# Patient Record
Sex: FEMALE | Race: WHITE | NOT HISPANIC OR LATINO | Employment: OTHER | URBAN - METROPOLITAN AREA
[De-identification: names, ages, dates, MRNs, and addresses within clinical notes are randomized per-mention and may not be internally consistent; named-entity substitution may affect disease eponyms.]

---

## 2017-01-01 ENCOUNTER — HOSPITAL ENCOUNTER (EMERGENCY)
Facility: HOSPITAL | Age: 77
Discharge: HOME | End: 2017-12-11
Attending: EMERGENCY MEDICINE
Payer: MEDICARE

## 2017-01-01 VITALS
SYSTOLIC BLOOD PRESSURE: 153 MMHG | RESPIRATION RATE: 18 BRPM | TEMPERATURE: 97 F | DIASTOLIC BLOOD PRESSURE: 88 MMHG | BODY MASS INDEX: 19.14 KG/M2 | HEIGHT: 63 IN | WEIGHT: 108 LBS | OXYGEN SATURATION: 100 % | HEART RATE: 67 BPM

## 2017-01-01 DIAGNOSIS — R10.30 PAIN IN THE GROIN: ICD-10-CM

## 2017-01-01 DIAGNOSIS — R10.9 ABDOMINAL PAIN, UNSPECIFIED ABDOMINAL LOCATION: Primary | ICD-10-CM

## 2017-01-01 LAB
ALBUMIN SERPL BCP-MCNC: 3.8 G/DL
ALP SERPL-CCNC: 98 U/L
ALT SERPL W/O P-5'-P-CCNC: 8 U/L
ANION GAP SERPL CALC-SCNC: 10 MMOL/L
AST SERPL-CCNC: 16 U/L
BACTERIA #/AREA URNS AUTO: NORMAL /HPF
BASOPHILS # BLD AUTO: 0.05 K/UL
BASOPHILS NFR BLD: 0.7 %
BILIRUB SERPL-MCNC: 0.7 MG/DL
BILIRUB UR QL STRIP: NEGATIVE
BUN SERPL-MCNC: 26 MG/DL
BUN SERPL-MCNC: 29 MG/DL (ref 6–30)
CALCIUM SERPL-MCNC: 9.7 MG/DL
CHLORIDE SERPL-SCNC: 101 MMOL/L
CHLORIDE SERPL-SCNC: 103 MMOL/L (ref 95–110)
CLARITY UR REFRACT.AUTO: ABNORMAL
CO2 SERPL-SCNC: 21 MMOL/L
COLOR UR AUTO: YELLOW
CREAT SERPL-MCNC: 1.6 MG/DL
CREAT SERPL-MCNC: 1.7 MG/DL (ref 0.5–1.4)
DIFFERENTIAL METHOD: ABNORMAL
EOSINOPHIL # BLD AUTO: 0.1 K/UL
EOSINOPHIL NFR BLD: 1.2 %
ERYTHROCYTE [DISTWIDTH] IN BLOOD BY AUTOMATED COUNT: 14.7 %
EST. GFR  (AFRICAN AMERICAN): 35.3 ML/MIN/1.73 M^2
EST. GFR  (NON AFRICAN AMERICAN): 30.6 ML/MIN/1.73 M^2
GLUCOSE SERPL-MCNC: 91 MG/DL
GLUCOSE SERPL-MCNC: 98 MG/DL (ref 70–110)
GLUCOSE UR QL STRIP: NEGATIVE
HCT VFR BLD AUTO: 41.5 %
HCT VFR BLD CALC: 43 %PCV (ref 36–54)
HGB BLD-MCNC: 13.6 G/DL
HGB UR QL STRIP: NEGATIVE
HYALINE CASTS UR QL AUTO: 1 /LPF
IMM GRANULOCYTES # BLD AUTO: 0.09 K/UL
IMM GRANULOCYTES NFR BLD AUTO: 1.3 %
KETONES UR QL STRIP: NEGATIVE
LACTATE SERPL-SCNC: 2.1 MMOL/L
LEUKOCYTE ESTERASE UR QL STRIP: ABNORMAL
LIPASE SERPL-CCNC: 22 U/L
LYMPHOCYTES # BLD AUTO: 1.8 K/UL
LYMPHOCYTES NFR BLD: 27.2 %
MCH RBC QN AUTO: 31.6 PG
MCHC RBC AUTO-ENTMCNC: 32.8 G/DL
MCV RBC AUTO: 97 FL
MICROSCOPIC COMMENT: NORMAL
MONOCYTES # BLD AUTO: 0.4 K/UL
MONOCYTES NFR BLD: 6 %
NEUTROPHILS # BLD AUTO: 4.2 K/UL
NEUTROPHILS NFR BLD: 63.6 %
NITRITE UR QL STRIP: NEGATIVE
NRBC BLD-RTO: 0 /100 WBC
PH UR STRIP: 7 [PH] (ref 5–8)
PLATELET # BLD AUTO: 180 K/UL
PMV BLD AUTO: 10.9 FL
POC IONIZED CALCIUM: 1.05 MMOL/L (ref 1.06–1.42)
POC TCO2 (MEASURED): 23 MMOL/L (ref 23–29)
POTASSIUM BLD-SCNC: 5.5 MMOL/L (ref 3.5–5.1)
POTASSIUM SERPL-SCNC: 5.5 MMOL/L
PROT SERPL-MCNC: 8.3 G/DL
PROT UR QL STRIP: NEGATIVE
RBC # BLD AUTO: 4.3 M/UL
RBC #/AREA URNS AUTO: 2 /HPF (ref 0–4)
SAMPLE: ABNORMAL
SODIUM BLD-SCNC: 134 MMOL/L (ref 136–145)
SODIUM SERPL-SCNC: 132 MMOL/L
SP GR UR STRIP: 1.01 (ref 1–1.03)
SQUAMOUS #/AREA URNS AUTO: 7 /HPF
URN SPEC COLLECT METH UR: ABNORMAL
UROBILINOGEN UR STRIP-ACNC: NEGATIVE EU/DL
WBC # BLD AUTO: 6.68 K/UL
WBC #/AREA URNS AUTO: 1 /HPF (ref 0–5)

## 2017-01-01 PROCEDURE — 85025 COMPLETE CBC W/AUTO DIFF WBC: CPT

## 2017-01-01 PROCEDURE — 83690 ASSAY OF LIPASE: CPT

## 2017-01-01 PROCEDURE — 99284 EMERGENCY DEPT VISIT MOD MDM: CPT | Mod: 25

## 2017-01-01 PROCEDURE — 96361 HYDRATE IV INFUSION ADD-ON: CPT

## 2017-01-01 PROCEDURE — 25000003 PHARM REV CODE 250: Performed by: PHYSICIAN ASSISTANT

## 2017-01-01 PROCEDURE — 80053 COMPREHEN METABOLIC PANEL: CPT

## 2017-01-01 PROCEDURE — 81001 URINALYSIS AUTO W/SCOPE: CPT

## 2017-01-01 PROCEDURE — 96360 HYDRATION IV INFUSION INIT: CPT

## 2017-01-01 PROCEDURE — 99284 EMERGENCY DEPT VISIT MOD MDM: CPT | Mod: ,,, | Performed by: EMERGENCY MEDICINE

## 2017-01-01 PROCEDURE — 83605 ASSAY OF LACTIC ACID: CPT

## 2017-01-01 RX ORDER — TRIMETHOPRIM 100 MG/1
100 TABLET ORAL EVERY 12 HOURS
Status: ON HOLD | COMMUNITY
End: 2018-01-01

## 2017-01-01 RX ORDER — AMIODARONE HYDROCHLORIDE 200 MG/1
TABLET ORAL DAILY
COMMUNITY

## 2017-01-01 RX ORDER — LEVOTHYROXINE SODIUM 25 UG/1
25 TABLET ORAL DAILY
COMMUNITY

## 2017-01-01 RX ORDER — DICYCLOMINE HYDROCHLORIDE 10 MG/1
10 CAPSULE ORAL
Status: COMPLETED | OUTPATIENT
Start: 2017-01-01 | End: 2017-01-01

## 2017-01-01 RX ORDER — SODIUM CHLORIDE 9 MG/ML
500 INJECTION, SOLUTION INTRAVENOUS
Status: COMPLETED | OUTPATIENT
Start: 2017-01-01 | End: 2017-01-01

## 2017-01-01 RX ORDER — BENAZEPRIL HYDROCHLORIDE 20 MG/1
20 TABLET ORAL DAILY
Status: ON HOLD | COMMUNITY
End: 2018-01-01

## 2017-01-01 RX ORDER — GABAPENTIN 600 MG/1
600 TABLET ORAL 3 TIMES DAILY
COMMUNITY

## 2017-01-01 RX ORDER — ALPRAZOLAM 0.5 MG/1
0.5 TABLET ORAL 3 TIMES DAILY
Status: ON HOLD | COMMUNITY
End: 2018-01-01

## 2017-01-01 RX ORDER — DICYCLOMINE HYDROCHLORIDE 20 MG/1
20 TABLET ORAL 2 TIMES DAILY PRN
Qty: 20 TABLET | Refills: 0 | Status: SHIPPED | OUTPATIENT
Start: 2017-01-01 | End: 2018-01-01

## 2017-01-01 RX ORDER — ESTRADIOL 0.5 MG/1
0.5 TABLET ORAL DAILY
Status: ON HOLD | COMMUNITY
End: 2018-01-01 | Stop reason: HOSPADM

## 2017-01-01 RX ORDER — ACETAMINOPHEN 325 MG/1
650 TABLET ORAL
Status: COMPLETED | OUTPATIENT
Start: 2017-01-01 | End: 2017-01-01

## 2017-01-01 RX ORDER — LUBIPROSTONE 24 UG/1
24 CAPSULE ORAL 2 TIMES DAILY WITH MEALS
COMMUNITY

## 2017-01-01 RX ORDER — CHOLESTYRAMINE
POWDER (GRAM) MISCELLANEOUS
Status: ON HOLD | COMMUNITY
End: 2018-01-01

## 2017-01-01 RX ORDER — PROMETHAZINE HYDROCHLORIDE 25 MG/1
25 TABLET ORAL EVERY 6 HOURS PRN
COMMUNITY

## 2017-01-01 RX ADMIN — ACETAMINOPHEN 650 MG: 325 TABLET ORAL at 03:12

## 2017-01-01 RX ADMIN — DICYCLOMINE HYDROCHLORIDE 10 MG: 10 CAPSULE ORAL at 04:12

## 2017-01-01 RX ADMIN — SODIUM CHLORIDE 500 ML: 0.9 INJECTION, SOLUTION INTRAVENOUS at 03:12

## 2017-12-11 NOTE — ED PROVIDER NOTES
Encounter Date: 12/11/2017    SCRIBE #1 NOTE: I, Katelyn Simpson , am scribing for, and in the presence of,  Dr. Singh . I have scribed the following portions of the note - the APC attestation.       History     Chief Complaint   Patient presents with    Female  Problem     i think i have a kidney infection, been fighting infection for 1 year,      Patient is a 77-year-old female with past medical history of hypertension, renal disorder, thyroid disease, coronary artery disease, anticoagulation longer use, who presents to the emergency department due to a 2 day history of lower quadrant abdominal pain.  Patient states she has had frequent UTIs in the past and this feels similar in nature.  Patient is currently staying with her son but receives the majority of her care in [place].  Patient states she is to follow up with urology near future due to frequent UTIs.  Patient is requesting a urine analysis and urine culture that she can take to her follow-up appointment with urology.  In speaking with the patient's daughter-in-law individually she states that the patient does take multiple doses of laxatives a day and then takes Imodium due to the cramping.  Patient denies any fevers, chills, chest pain, shortness of breath, or any other complaints.            Review of patient's allergies indicates:  No Known Allergies  Past Medical History:   Diagnosis Date    Anticoagulant long-term use     Coronary artery disease     Hypertension     Renal disorder     Thyroid disease      Past Surgical History:   Procedure Laterality Date    APPENDECTOMY      CARDIAC SURGERY      HYSTERECTOMY      JOINT REPLACEMENT       History reviewed. No pertinent family history.  Social History   Substance Use Topics    Smoking status: Never Smoker    Smokeless tobacco: Never Used    Alcohol use No     Review of Systems   Constitutional: Negative for activity change, appetite change, diaphoresis, fatigue and fever.   HENT:  Negative for congestion, dental problem, drooling, ear pain, facial swelling, sore throat and trouble swallowing.    Eyes: Negative for pain, discharge and visual disturbance.   Respiratory: Negative for apnea, cough, chest tightness and shortness of breath.    Cardiovascular: Negative for chest pain and palpitations.   Gastrointestinal: Positive for abdominal pain. Negative for abdominal distention, anal bleeding, blood in stool, diarrhea, nausea and vomiting.   Endocrine: Negative for cold intolerance and polydipsia.   Genitourinary: Negative for decreased urine volume, difficulty urinating, enuresis, frequency and hematuria.   Musculoskeletal: Negative for arthralgias, gait problem, myalgias and neck stiffness.   Skin: Negative for color change and pallor.   Allergic/Immunologic: Negative for environmental allergies.   Neurological: Negative for dizziness, syncope, numbness and headaches.   Psychiatric/Behavioral: Negative for agitation, confusion and dysphoric mood.       Physical Exam     Initial Vitals [12/11/17 1027]   BP Pulse Resp Temp SpO2   (!) 153/88 67 18 97.3 °F (36.3 °C) 100 %      MAP       109.67         Physical Exam    Nursing note and vitals reviewed.  Constitutional: She appears well-developed and well-nourished. She is not diaphoretic. No distress.   HENT:   Head: Normocephalic and atraumatic.   Neck: Normal range of motion. Neck supple.   Cardiovascular: Normal rate, regular rhythm and normal heart sounds. Exam reveals no gallop and no friction rub.    No murmur heard.  Pulmonary/Chest: Breath sounds normal. She has no wheezes. She has no rhonchi. She has no rales.   Abdominal: Soft. Bowel sounds are normal. There is tenderness. There is no rebound and no guarding.   Musculoskeletal: Normal range of motion.   Neurological: She is alert and oriented to person, place, and time.   Skin: Skin is warm and dry. No rash noted. No erythema.   Psychiatric: She has a normal mood and affect.         ED  Course   Procedures  Labs Reviewed   URINALYSIS, REFLEX TO URINE CULTURE - Abnormal; Notable for the following:        Result Value    Appearance, UA Hazy (*)     Leukocytes, UA Trace (*)     All other components within normal limits    Narrative:     Preferred Collection Type->Urine, Clean Catch   CBC W/ AUTO DIFFERENTIAL - Abnormal; Notable for the following:     MCH 31.6 (*)     RDW 14.7 (*)     Immature Granulocytes 1.3 (*)     Immature Grans (Abs) 0.09 (*)     All other components within normal limits   COMPREHENSIVE METABOLIC PANEL - Abnormal; Notable for the following:     Sodium 132 (*)     Potassium 5.5 (*)     CO2 21 (*)     BUN, Bld 26 (*)     Creatinine 1.6 (*)     ALT 8 (*)     eGFR if  35.3 (*)     eGFR if non  30.6 (*)     All other components within normal limits    Narrative:     ADD ON CMP & LIPASE PER DR. JUAN CARLOS WEBB AT  12/11/2017  15:53   (USED GREENX)   ISTAT PROCEDURE - Abnormal; Notable for the following:     POC Creatinine 1.7 (*)     POC Sodium 134 (*)     POC Potassium 5.5 (*)     POC Ionized Calcium 1.05 (*)     All other components within normal limits   URINALYSIS MICROSCOPIC    Narrative:     Preferred Collection Type->Urine, Clean Catch   LACTIC ACID, PLASMA   COMPREHENSIVE METABOLIC PANEL   LIPASE   LIPASE    Narrative:     ADD ON CMP & LIPASE PER DR. JUAN CARLOS WEBB AT  12/11/2017  15:53   (USED GREENX)                   APC / Resident Notes:   Patient is a 77-year-old female with past medical history of hypertension, renal disorder, thyroid disease, coronary artery disease, anticoagulation longer use, who presents to the emergency department due to a 2 day history of lower quadrant abdominal pain.  Physical exam reveals female in no acute distress.  Heart regular rate and rhythm.  Lungs clear to auscultation bilaterally.  Abdomen tender to palpation in the lower quadrant.  Will obtain UA and lab work.    UA shows no sign of infection. CBC fairly  unremarkable.  CMP shows sodium of 132, potassium 5.5, , creatinine 1.6.  Lipase 22.  CT shows no evidence of obstructing renal stone.  Patient will be discharged home in stable condition.       Scribe Attestation:   Scribe #1: I performed the above scribed service and the documentation accurately describes the services I performed. I attest to the accuracy of the note.    Attending Attestation:     Physician Attestation Statement for NP/PA:   I have conducted a face to face encounter with this patient in addition to the NP/PA, due to Medical Complexity    Other NP/PA Attestation Additions:    History of Present Illness: 78 yo women with HTN, recurrent UTIs presenting with lower abdominal pain and dysuria for the last day concerning for UTI. Urine is negative. CT negative for acute process.  Patient and son extensively counseled findings.  On reevaluation, patient's abdomen is soft, she reports pain improved, and she tolerating food by mouth.  Extensively advised indications to return.         Physician Attestation for Scribe:      Comments: I, Dr. John Singh, personally performed the services described in this documentation. All medical record entries made by the scribe were at my direction and in my presence.  I have reviewed the chart and agree that the record reflects my personal performance and is accurate and complete. John Singh MD.  5:34 PM 12/11/2017              ED Course      Clinical Impression:   The encounter diagnosis was Abdominal pain, unspecified abdominal location.    Disposition:   Disposition: Discharged  Condition: Stable                        Anu Perez PA-C  12/11/17 2110

## 2017-12-11 NOTE — ED TRIAGE NOTES
"Lower pelvic pain for the last year,  Believes it to be a UTI.  Denies fever. Coming from [facility], [place]. Also, has a  "infection" between her great and 2nd  left toe.   "

## 2018-01-01 ENCOUNTER — HOSPITAL ENCOUNTER (INPATIENT)
Facility: HOSPITAL | Age: 78
LOS: 11 days | DRG: 175 | End: 2018-05-15
Attending: EMERGENCY MEDICINE | Admitting: FAMILY MEDICINE
Payer: MEDICARE

## 2018-01-01 ENCOUNTER — PATIENT OUTREACH (OUTPATIENT)
Dept: ADMINISTRATIVE | Facility: CLINIC | Age: 78
End: 2018-01-01

## 2018-01-01 VITALS
BODY MASS INDEX: 20.71 KG/M2 | OXYGEN SATURATION: 74 % | TEMPERATURE: 97 F | WEIGHT: 116.88 LBS | DIASTOLIC BLOOD PRESSURE: 47 MMHG | SYSTOLIC BLOOD PRESSURE: 64 MMHG | HEIGHT: 63 IN

## 2018-01-01 DIAGNOSIS — Z51.5 HOSPICE CARE: ICD-10-CM

## 2018-01-01 DIAGNOSIS — I26.99 PULMONARY EMBOLISM WITH INFARCTION: ICD-10-CM

## 2018-01-01 DIAGNOSIS — I48.91 ATRIAL FIBRILLATION WITH RVR: ICD-10-CM

## 2018-01-01 DIAGNOSIS — J18.9 HCAP (HEALTHCARE-ASSOCIATED PNEUMONIA): ICD-10-CM

## 2018-01-01 DIAGNOSIS — I10 ESSENTIAL HYPERTENSION: ICD-10-CM

## 2018-01-01 DIAGNOSIS — I50.9 CHF (CONGESTIVE HEART FAILURE): ICD-10-CM

## 2018-01-01 DIAGNOSIS — I95.1 ORTHOSTATIC HYPOTENSION: ICD-10-CM

## 2018-01-01 DIAGNOSIS — I50.42 CHRONIC COMBINED SYSTOLIC AND DIASTOLIC HEART FAILURE: ICD-10-CM

## 2018-01-01 DIAGNOSIS — B96.29 UTI DUE TO EXTENDED-SPECTRUM BETA LACTAMASE (ESBL) PRODUCING ESCHERICHIA COLI: ICD-10-CM

## 2018-01-01 DIAGNOSIS — J90 PLEURAL EFFUSION, RIGHT: ICD-10-CM

## 2018-01-01 DIAGNOSIS — R09.02 HYPOXIA: ICD-10-CM

## 2018-01-01 DIAGNOSIS — I26.99 BILATERAL PULMONARY EMBOLISM: Primary | ICD-10-CM

## 2018-01-01 DIAGNOSIS — I50.40 COMBINED SYSTOLIC AND DIASTOLIC CONGESTIVE HEART FAILURE, UNSPECIFIED HF CHRONICITY: ICD-10-CM

## 2018-01-01 DIAGNOSIS — D72.829 LEUKOCYTOSIS, UNSPECIFIED TYPE: ICD-10-CM

## 2018-01-01 DIAGNOSIS — I48.19 PERSISTENT ATRIAL FIBRILLATION: ICD-10-CM

## 2018-01-01 DIAGNOSIS — I50.43 ACUTE ON CHRONIC COMBINED SYSTOLIC AND DIASTOLIC CONGESTIVE HEART FAILURE: ICD-10-CM

## 2018-01-01 DIAGNOSIS — E87.1 HYPONATREMIA: ICD-10-CM

## 2018-01-01 DIAGNOSIS — R07.9 CHEST PAIN: ICD-10-CM

## 2018-01-01 DIAGNOSIS — I48.91 ATRIAL FIBRILLATION: ICD-10-CM

## 2018-01-01 DIAGNOSIS — N39.0 UTI DUE TO EXTENDED-SPECTRUM BETA LACTAMASE (ESBL) PRODUCING ESCHERICHIA COLI: ICD-10-CM

## 2018-01-01 DIAGNOSIS — R07.1 CHEST PAIN ON BREATHING: ICD-10-CM

## 2018-01-01 DIAGNOSIS — A41.9 SEPSIS, DUE TO UNSPECIFIED ORGANISM: ICD-10-CM

## 2018-01-01 DIAGNOSIS — Z16.12 UTI DUE TO EXTENDED-SPECTRUM BETA LACTAMASE (ESBL) PRODUCING ESCHERICHIA COLI: ICD-10-CM

## 2018-01-01 LAB
ALBUMIN SERPL BCP-MCNC: 1.5 G/DL
ALBUMIN SERPL BCP-MCNC: 1.5 G/DL
ALBUMIN SERPL BCP-MCNC: 1.6 G/DL
ALBUMIN SERPL BCP-MCNC: 1.6 G/DL
ALBUMIN SERPL BCP-MCNC: 1.7 G/DL
ALBUMIN SERPL BCP-MCNC: 1.7 G/DL
ALBUMIN SERPL BCP-MCNC: 1.8 G/DL
ALBUMIN SERPL BCP-MCNC: 2.3 G/DL
ALLENS TEST: ABNORMAL
ALP SERPL-CCNC: 105 U/L
ALP SERPL-CCNC: 106 U/L
ALP SERPL-CCNC: 115 U/L
ALP SERPL-CCNC: 115 U/L
ALP SERPL-CCNC: 118 U/L
ALP SERPL-CCNC: 148 U/L
ALP SERPL-CCNC: 81 U/L
ALP SERPL-CCNC: 81 U/L
ALP SERPL-CCNC: 89 U/L
ALP SERPL-CCNC: 90 U/L
ALP SERPL-CCNC: 93 U/L
ALT SERPL W/O P-5'-P-CCNC: 14 U/L
ALT SERPL W/O P-5'-P-CCNC: 21 U/L
ALT SERPL W/O P-5'-P-CCNC: 30 U/L
ALT SERPL W/O P-5'-P-CCNC: 7 U/L
ALT SERPL W/O P-5'-P-CCNC: 7 U/L
ALT SERPL W/O P-5'-P-CCNC: 8 U/L
ALT SERPL W/O P-5'-P-CCNC: 9 U/L
ALT SERPL W/O P-5'-P-CCNC: 9 U/L
AMYLASE, BODY FLUID: 16 U/L
ANION GAP SERPL CALC-SCNC: 10 MMOL/L
ANION GAP SERPL CALC-SCNC: 11 MMOL/L
ANION GAP SERPL CALC-SCNC: 13 MMOL/L
ANION GAP SERPL CALC-SCNC: 13 MMOL/L
ANION GAP SERPL CALC-SCNC: 6 MMOL/L
ANION GAP SERPL CALC-SCNC: 7 MMOL/L
ANION GAP SERPL CALC-SCNC: 7 MMOL/L
ANION GAP SERPL CALC-SCNC: 9 MMOL/L
ANION GAP SERPL CALC-SCNC: 9 MMOL/L
ANISOCYTOSIS BLD QL SMEAR: SLIGHT
ANISOCYTOSIS BLD QL SMEAR: SLIGHT
AORTIC VALVE STENOSIS: ABNORMAL
APPEARANCE FLD: NORMAL
APTT BLDCRRT: 22.5 SEC
APTT BLDCRRT: 28.1 SEC
APTT BLDCRRT: 36.2 SEC
APTT BLDCRRT: 39.1 SEC
APTT BLDCRRT: 49.7 SEC
APTT BLDCRRT: 51.2 SEC
APTT BLDCRRT: 51.9 SEC
APTT BLDCRRT: 57.4 SEC
APTT BLDCRRT: 68.3 SEC
APTT BLDCRRT: 69.6 SEC
AST SERPL-CCNC: 12 U/L
AST SERPL-CCNC: 13 U/L
AST SERPL-CCNC: 14 U/L
AST SERPL-CCNC: 15 U/L
AST SERPL-CCNC: 16 U/L
AST SERPL-CCNC: 36 U/L
AST SERPL-CCNC: 69 U/L
BACTERIA #/AREA URNS HPF: ABNORMAL /HPF
BACTERIA #/AREA URNS HPF: ABNORMAL /HPF
BACTERIA BLD CULT: NORMAL
BACTERIA BLD CULT: NORMAL
BACTERIA FLD CULT: NORMAL
BACTERIA UR CULT: NORMAL
BACTERIA UR CULT: NORMAL
BASOPHILS # BLD AUTO: 0.01 K/UL
BASOPHILS # BLD AUTO: 0.02 K/UL
BASOPHILS # BLD AUTO: 0.03 K/UL
BASOPHILS # BLD AUTO: 0.04 K/UL
BASOPHILS # BLD AUTO: 0.06 K/UL
BASOPHILS # BLD AUTO: ABNORMAL K/UL
BASOPHILS NFR BLD: 0 %
BASOPHILS NFR BLD: 0.1 %
BASOPHILS NFR BLD: 0.2 %
BASOPHILS NFR BLD: 0.2 %
BASOPHILS NFR BLD: 0.3 %
BASOPHILS NFR BLD: 0.5 %
BASOPHILS NFR BLD: 0.6 %
BASOPHILS NFR BLD: 0.6 %
BILIRUB SERPL-MCNC: 0.3 MG/DL
BILIRUB SERPL-MCNC: 0.3 MG/DL
BILIRUB SERPL-MCNC: 0.4 MG/DL
BILIRUB SERPL-MCNC: 0.4 MG/DL
BILIRUB SERPL-MCNC: 0.5 MG/DL
BILIRUB SERPL-MCNC: 0.7 MG/DL
BILIRUB SERPL-MCNC: 0.8 MG/DL
BILIRUB UR QL STRIP: ABNORMAL
BILIRUB UR QL STRIP: NEGATIVE
BNP SERPL-MCNC: 1282 PG/ML
BNP SERPL-MCNC: 415 PG/ML
BODY FLD TYPE: NORMAL
BODY FLUID SOURCE AMYLASE: NORMAL
BODY FLUID SOURCE, LDH: NORMAL
BUN SERPL-MCNC: 10 MG/DL
BUN SERPL-MCNC: 11 MG/DL
BUN SERPL-MCNC: 12 MG/DL
BUN SERPL-MCNC: 12 MG/DL
BUN SERPL-MCNC: 13 MG/DL
BUN SERPL-MCNC: 18 MG/DL
BUN SERPL-MCNC: 23 MG/DL
BUN SERPL-MCNC: 9 MG/DL
CALCIUM SERPL-MCNC: 8 MG/DL
CALCIUM SERPL-MCNC: 8 MG/DL
CALCIUM SERPL-MCNC: 8.1 MG/DL
CALCIUM SERPL-MCNC: 8.3 MG/DL
CALCIUM SERPL-MCNC: 8.5 MG/DL
CALCIUM SERPL-MCNC: 8.6 MG/DL
CALCIUM SERPL-MCNC: 8.7 MG/DL
CALCIUM SERPL-MCNC: 8.8 MG/DL
CALCIUM SERPL-MCNC: 9 MG/DL
CHLORIDE SERPL-SCNC: 100 MMOL/L
CHLORIDE SERPL-SCNC: 101 MMOL/L
CHLORIDE SERPL-SCNC: 102 MMOL/L
CHLORIDE SERPL-SCNC: 103 MMOL/L
CHLORIDE SERPL-SCNC: 105 MMOL/L
CHLORIDE SERPL-SCNC: 90 MMOL/L
CHLORIDE SERPL-SCNC: 95 MMOL/L
CHLORIDE SERPL-SCNC: 95 MMOL/L
CHLORIDE SERPL-SCNC: 98 MMOL/L
CHLORIDE SERPL-SCNC: 98 MMOL/L
CHLORIDE SERPL-SCNC: 99 MMOL/L
CHOLEST FLD-MCNC: 52 MG/DL
CLARITY UR: CLEAR
CLARITY UR: CLEAR
CO2 SERPL-SCNC: 22 MMOL/L
CO2 SERPL-SCNC: 23 MMOL/L
CO2 SERPL-SCNC: 25 MMOL/L
CO2 SERPL-SCNC: 25 MMOL/L
CO2 SERPL-SCNC: 26 MMOL/L
CO2 SERPL-SCNC: 27 MMOL/L
COLOR FLD: YELLOW
COLOR UR: YELLOW
COLOR UR: YELLOW
CORTIS SERPL-MCNC: 12.8 UG/DL
CREAT SERPL-MCNC: 0.7 MG/DL
CREAT SERPL-MCNC: 0.8 MG/DL
DELSYS: ABNORMAL
DIFFERENTIAL METHOD: ABNORMAL
EOSINOPHIL # BLD AUTO: 0 K/UL
EOSINOPHIL # BLD AUTO: 0.1 K/UL
EOSINOPHIL # BLD AUTO: 0.2 K/UL
EOSINOPHIL # BLD AUTO: ABNORMAL K/UL
EOSINOPHIL NFR BLD: 0 %
EOSINOPHIL NFR BLD: 0.1 %
EOSINOPHIL NFR BLD: 0.1 %
EOSINOPHIL NFR BLD: 0.8 %
EOSINOPHIL NFR BLD: 1.2 %
EOSINOPHIL NFR BLD: 1.5 %
EOSINOPHIL NFR BLD: 1.6 %
EOSINOPHIL NFR BLD: 1.6 %
EOSINOPHIL NFR BLD: 1.8 %
EOSINOPHIL NFR BLD: 2 %
EOSINOPHIL NFR BLD: 2.7 %
EOSINOPHIL NFR BLD: 3 %
ERYTHROCYTE [DISTWIDTH] IN BLOOD BY AUTOMATED COUNT: 17.5 %
ERYTHROCYTE [DISTWIDTH] IN BLOOD BY AUTOMATED COUNT: 17.5 %
ERYTHROCYTE [DISTWIDTH] IN BLOOD BY AUTOMATED COUNT: 17.7 %
ERYTHROCYTE [DISTWIDTH] IN BLOOD BY AUTOMATED COUNT: 17.7 %
ERYTHROCYTE [DISTWIDTH] IN BLOOD BY AUTOMATED COUNT: 17.9 %
ERYTHROCYTE [DISTWIDTH] IN BLOOD BY AUTOMATED COUNT: 18 %
ERYTHROCYTE [DISTWIDTH] IN BLOOD BY AUTOMATED COUNT: 18.1 %
ERYTHROCYTE [DISTWIDTH] IN BLOOD BY AUTOMATED COUNT: 18.2 %
ERYTHROCYTE [DISTWIDTH] IN BLOOD BY AUTOMATED COUNT: 18.3 %
EST. GFR  (AFRICAN AMERICAN): >60 ML/MIN/1.73 M^2
EST. GFR  (NON AFRICAN AMERICAN): >60 ML/MIN/1.73 M^2
ESTIMATED PA SYSTOLIC PRESSURE: 29.42
FACT X PPP CHRO-ACNC: 0.28 IU/ML
FACT X PPP CHRO-ACNC: 0.32 IU/ML
FACT X PPP CHRO-ACNC: <0.1 IU/ML
FACT X PPP CHRO-ACNC: <0.1 IU/ML
GLOBAL PERICARDIAL EFFUSION: ABNORMAL
GLUCOSE SERPL-MCNC: 123 MG/DL
GLUCOSE SERPL-MCNC: 124 MG/DL
GLUCOSE SERPL-MCNC: 154 MG/DL
GLUCOSE SERPL-MCNC: 80 MG/DL
GLUCOSE SERPL-MCNC: 81 MG/DL
GLUCOSE SERPL-MCNC: 85 MG/DL
GLUCOSE SERPL-MCNC: 93 MG/DL
GLUCOSE SERPL-MCNC: 94 MG/DL
GLUCOSE SERPL-MCNC: 96 MG/DL
GLUCOSE SERPL-MCNC: 98 MG/DL
GLUCOSE SERPL-MCNC: 98 MG/DL
GLUCOSE UR QL STRIP: NEGATIVE
GLUCOSE UR QL STRIP: NEGATIVE
GRAM STN SPEC: NORMAL
GRAM STN SPEC: NORMAL
HBV CORE AB SERPL QL IA: NEGATIVE
HBV SURFACE AG SERPL QL IA: NEGATIVE
HCO3 UR-SCNC: 23.3 MMOL/L (ref 24–28)
HCT VFR BLD AUTO: 32.9 %
HCT VFR BLD AUTO: 33.6 %
HCT VFR BLD AUTO: 33.6 %
HCT VFR BLD AUTO: 34.1 %
HCT VFR BLD AUTO: 34.8 %
HCT VFR BLD AUTO: 36.4 %
HCT VFR BLD AUTO: 36.7 %
HCT VFR BLD AUTO: 37 %
HCT VFR BLD AUTO: 37.6 %
HCT VFR BLD AUTO: 38.1 %
HCT VFR BLD AUTO: 38.8 %
HCT VFR BLD AUTO: 40.6 %
HGB BLD-MCNC: 10.4 G/DL
HGB BLD-MCNC: 10.7 G/DL
HGB BLD-MCNC: 10.8 G/DL
HGB BLD-MCNC: 10.9 G/DL
HGB BLD-MCNC: 11 G/DL
HGB BLD-MCNC: 11.5 G/DL
HGB BLD-MCNC: 11.6 G/DL
HGB BLD-MCNC: 11.7 G/DL
HGB BLD-MCNC: 12.1 G/DL
HGB BLD-MCNC: 12.1 G/DL
HGB BLD-MCNC: 12.3 G/DL
HGB BLD-MCNC: 12.7 G/DL
HGB UR QL STRIP: ABNORMAL
HGB UR QL STRIP: NEGATIVE
HYALINE CASTS #/AREA URNS LPF: 2 /LPF
HYPOCHROMIA BLD QL SMEAR: ABNORMAL
INR PPP: 1.1
KETONES UR QL STRIP: ABNORMAL
KETONES UR QL STRIP: NEGATIVE
LACTATE SERPL-SCNC: 1.2 MMOL/L
LACTATE SERPL-SCNC: 1.3 MMOL/L
LACTATE SERPL-SCNC: 2.1 MMOL/L
LDH FLD L TO P-CCNC: 228 U/L
LDH SERPL L TO P-CCNC: 161 U/L
LEUKOCYTE ESTERASE UR QL STRIP: ABNORMAL
LEUKOCYTE ESTERASE UR QL STRIP: ABNORMAL
LYMPHOCYTES # BLD AUTO: 0.6 K/UL
LYMPHOCYTES # BLD AUTO: 0.7 K/UL
LYMPHOCYTES # BLD AUTO: 0.9 K/UL
LYMPHOCYTES # BLD AUTO: 1 K/UL
LYMPHOCYTES # BLD AUTO: 1.1 K/UL
LYMPHOCYTES # BLD AUTO: 1.2 K/UL
LYMPHOCYTES # BLD AUTO: 1.3 K/UL
LYMPHOCYTES # BLD AUTO: 1.4 K/UL
LYMPHOCYTES # BLD AUTO: ABNORMAL K/UL
LYMPHOCYTES NFR BLD: 11.1 %
LYMPHOCYTES NFR BLD: 11.6 %
LYMPHOCYTES NFR BLD: 12.7 %
LYMPHOCYTES NFR BLD: 15.7 %
LYMPHOCYTES NFR BLD: 17.6 %
LYMPHOCYTES NFR BLD: 2.1 %
LYMPHOCYTES NFR BLD: 20.2 %
LYMPHOCYTES NFR BLD: 3.5 %
LYMPHOCYTES NFR BLD: 5.7 %
LYMPHOCYTES NFR BLD: 7 %
LYMPHOCYTES NFR BLD: 8.9 %
LYMPHOCYTES NFR BLD: 9.6 %
LYMPHOCYTES NFR FLD MANUAL: 15 %
MAGNESIUM SERPL-MCNC: 1.6 MG/DL
MAGNESIUM SERPL-MCNC: 1.8 MG/DL
MCH RBC QN AUTO: 29.1 PG
MCH RBC QN AUTO: 29.1 PG
MCH RBC QN AUTO: 29.2 PG
MCH RBC QN AUTO: 29.4 PG
MCH RBC QN AUTO: 29.6 PG
MCH RBC QN AUTO: 30 PG
MCH RBC QN AUTO: 30.5 PG
MCHC RBC AUTO-ENTMCNC: 31.3 G/DL
MCHC RBC AUTO-ENTMCNC: 31.4 G/DL
MCHC RBC AUTO-ENTMCNC: 31.6 G/DL
MCHC RBC AUTO-ENTMCNC: 31.7 G/DL
MCHC RBC AUTO-ENTMCNC: 31.8 G/DL
MCHC RBC AUTO-ENTMCNC: 31.8 G/DL
MCHC RBC AUTO-ENTMCNC: 31.9 G/DL
MCHC RBC AUTO-ENTMCNC: 32 G/DL
MCHC RBC AUTO-ENTMCNC: 32.1 G/DL
MCHC RBC AUTO-ENTMCNC: 32.2 G/DL
MCV RBC AUTO: 90 FL
MCV RBC AUTO: 92 FL
MCV RBC AUTO: 93 FL
MCV RBC AUTO: 93 FL
MCV RBC AUTO: 94 FL
MCV RBC AUTO: 95 FL
MICROSCOPIC COMMENT: ABNORMAL
MICROSCOPIC COMMENT: ABNORMAL
MITRAL VALVE REGURGITATION: ABNORMAL
MONOCYTES # BLD AUTO: 0.6 K/UL
MONOCYTES # BLD AUTO: 0.7 K/UL
MONOCYTES # BLD AUTO: 0.8 K/UL
MONOCYTES # BLD AUTO: 0.9 K/UL
MONOCYTES # BLD AUTO: 1.1 K/UL
MONOCYTES # BLD AUTO: 1.2 K/UL
MONOCYTES # BLD AUTO: 1.3 K/UL
MONOCYTES # BLD AUTO: 1.5 K/UL
MONOCYTES # BLD AUTO: ABNORMAL K/UL
MONOCYTES NFR BLD: 4.5 %
MONOCYTES NFR BLD: 6.4 %
MONOCYTES NFR BLD: 6.7 %
MONOCYTES NFR BLD: 6.8 %
MONOCYTES NFR BLD: 7 %
MONOCYTES NFR BLD: 7.2 %
MONOCYTES NFR BLD: 7.8 %
MONOCYTES NFR BLD: 8.4 %
MONOCYTES NFR BLD: 8.6 %
MONOCYTES NFR BLD: 8.9 %
MONOCYTES NFR BLD: 8.9 %
MONOCYTES NFR BLD: 9.7 %
MONOS+MACROS NFR FLD MANUAL: 35 %
NEUTROPHILS # BLD AUTO: 13.9 K/UL
NEUTROPHILS # BLD AUTO: 18.6 K/UL
NEUTROPHILS # BLD AUTO: 25.5 K/UL
NEUTROPHILS # BLD AUTO: 4.4 K/UL
NEUTROPHILS # BLD AUTO: 4.4 K/UL
NEUTROPHILS # BLD AUTO: 4.6 K/UL
NEUTROPHILS # BLD AUTO: 6.9 K/UL
NEUTROPHILS # BLD AUTO: 8.2 K/UL
NEUTROPHILS # BLD AUTO: 8.4 K/UL
NEUTROPHILS # BLD AUTO: 9.6 K/UL
NEUTROPHILS # BLD AUTO: 9.8 K/UL
NEUTROPHILS # BLD AUTO: ABNORMAL K/UL
NEUTROPHILS NFR BLD: 65.9 %
NEUTROPHILS NFR BLD: 68.4 %
NEUTROPHILS NFR BLD: 68.7 %
NEUTROPHILS NFR BLD: 76.5 %
NEUTROPHILS NFR BLD: 76.5 %
NEUTROPHILS NFR BLD: 77.1 %
NEUTROPHILS NFR BLD: 80.4 %
NEUTROPHILS NFR BLD: 80.8 %
NEUTROPHILS NFR BLD: 83 %
NEUTROPHILS NFR BLD: 84.9 %
NEUTROPHILS NFR BLD: 88 %
NEUTROPHILS NFR BLD: 93.2 %
NEUTROPHILS NFR FLD MANUAL: 50 %
NEUTS BAND NFR BLD MANUAL: 1 %
NITRITE UR QL STRIP: NEGATIVE
NITRITE UR QL STRIP: POSITIVE
PCO2 BLDA: 39.9 MMHG (ref 35–45)
PH SMN: 7.37 [PH] (ref 7.35–7.45)
PH UR STRIP: 5 [PH] (ref 5–8)
PH UR STRIP: 6 [PH] (ref 5–8)
PHOSPHATE SERPL-MCNC: 3.1 MG/DL
PHOSPHATE SERPL-MCNC: 3.4 MG/DL
PLATELET # BLD AUTO: 242 K/UL
PLATELET # BLD AUTO: 273 K/UL
PLATELET # BLD AUTO: 295 K/UL
PLATELET # BLD AUTO: 300 K/UL
PLATELET # BLD AUTO: 322 K/UL
PLATELET # BLD AUTO: 332 K/UL
PLATELET # BLD AUTO: 338 K/UL
PLATELET # BLD AUTO: 342 K/UL
PLATELET # BLD AUTO: 351 K/UL
PLATELET # BLD AUTO: 377 K/UL
PLATELET # BLD AUTO: 418 K/UL
PLATELET # BLD AUTO: 487 K/UL
PLATELET BLD QL SMEAR: ABNORMAL
PLATELET BLD QL SMEAR: ABNORMAL
PMV BLD AUTO: 10.2 FL
PMV BLD AUTO: 10.7 FL
PMV BLD AUTO: 9.5 FL
PMV BLD AUTO: 9.6 FL
PMV BLD AUTO: 9.7 FL
PMV BLD AUTO: 9.8 FL
PMV BLD AUTO: 9.9 FL
PO2 BLDA: 79 MMHG (ref 80–100)
POC BE: -2 MMOL/L
POC SATURATED O2: 95 % (ref 95–100)
POC TCO2: 24 MMOL/L (ref 23–27)
POTASSIUM SERPL-SCNC: 3.1 MMOL/L
POTASSIUM SERPL-SCNC: 3.2 MMOL/L
POTASSIUM SERPL-SCNC: 3.4 MMOL/L
POTASSIUM SERPL-SCNC: 3.7 MMOL/L
POTASSIUM SERPL-SCNC: 4.1 MMOL/L
POTASSIUM SERPL-SCNC: 4.2 MMOL/L
POTASSIUM SERPL-SCNC: 4.3 MMOL/L
POTASSIUM SERPL-SCNC: 4.7 MMOL/L
POTASSIUM SERPL-SCNC: 4.7 MMOL/L
POTASSIUM SERPL-SCNC: 4.8 MMOL/L
POTASSIUM SERPL-SCNC: 5.1 MMOL/L
PROT FLD-MCNC: 2.8 G/DL
PROT SERPL-MCNC: 4.6 G/DL
PROT SERPL-MCNC: 4.7 G/DL
PROT SERPL-MCNC: 4.9 G/DL
PROT SERPL-MCNC: 5.1 G/DL
PROT SERPL-MCNC: 5.1 G/DL
PROT SERPL-MCNC: 5.2 G/DL
PROT SERPL-MCNC: 5.4 G/DL
PROT SERPL-MCNC: 5.4 G/DL
PROT SERPL-MCNC: 5.5 G/DL
PROT SERPL-MCNC: 5.6 G/DL
PROT SERPL-MCNC: 6 G/DL
PROT UR QL STRIP: ABNORMAL
PROT UR QL STRIP: ABNORMAL
PROTHROMBIN TIME: 11.1 SEC
RBC # BLD AUTO: 3.54 M/UL
RBC # BLD AUTO: 3.54 M/UL
RBC # BLD AUTO: 3.64 M/UL
RBC # BLD AUTO: 3.71 M/UL
RBC # BLD AUTO: 3.71 M/UL
RBC # BLD AUTO: 3.88 M/UL
RBC # BLD AUTO: 3.92 M/UL
RBC # BLD AUTO: 3.98 M/UL
RBC # BLD AUTO: 4.04 M/UL
RBC # BLD AUTO: 4.16 M/UL
RBC # BLD AUTO: 4.23 M/UL
RBC # BLD AUTO: 4.35 M/UL
RBC #/AREA URNS HPF: 20 /HPF (ref 0–4)
RETIRED EF AND QEF - SEE NOTES: 10 (ref 55–65)
SAMPLE: ABNORMAL
SITE: ABNORMAL
SODIUM SERPL-SCNC: 129 MMOL/L
SODIUM SERPL-SCNC: 129 MMOL/L
SODIUM SERPL-SCNC: 130 MMOL/L
SODIUM SERPL-SCNC: 131 MMOL/L
SODIUM SERPL-SCNC: 132 MMOL/L
SODIUM SERPL-SCNC: 133 MMOL/L
SODIUM SERPL-SCNC: 135 MMOL/L
SODIUM SERPL-SCNC: 136 MMOL/L
SODIUM SERPL-SCNC: 137 MMOL/L
SP GR UR STRIP: 1.01 (ref 1–1.03)
SP GR UR STRIP: >=1.03 (ref 1–1.03)
SPECIMEN SOURCE: NORMAL
SPECIMEN SOURCE: NORMAL
SQUAMOUS #/AREA URNS HPF: 2 /HPF
TB INDURATION 48 - 72 HR READ: 0 MM
TRICUSPID VALVE REGURGITATION: ABNORMAL
TROPONIN I SERPL DL<=0.01 NG/ML-MCNC: 0.01 NG/ML
TSH SERPL DL<=0.005 MIU/L-ACNC: 0.46 UIU/ML
URN SPEC COLLECT METH UR: ABNORMAL
URN SPEC COLLECT METH UR: ABNORMAL
UROBILINOGEN UR STRIP-ACNC: NEGATIVE EU/DL
UROBILINOGEN UR STRIP-ACNC: NEGATIVE EU/DL
VANCOMYCIN TROUGH SERPL-MCNC: 16.3 UG/ML
VANCOMYCIN TROUGH SERPL-MCNC: 7.4 UG/ML
WBC # BLD AUTO: 10.77 K/UL
WBC # BLD AUTO: 10.9 K/UL
WBC # BLD AUTO: 12.14 K/UL
WBC # BLD AUTO: 12.18 K/UL
WBC # BLD AUTO: 14.28 K/UL
WBC # BLD AUTO: 16.37 K/UL
WBC # BLD AUTO: 21.15 K/UL
WBC # BLD AUTO: 27.52 K/UL
WBC # BLD AUTO: 6.38 K/UL
WBC # BLD AUTO: 6.63 K/UL
WBC # BLD AUTO: 7.09 K/UL
WBC # BLD AUTO: 9.03 K/UL
WBC # FLD: 540 /CU MM
WBC #/AREA URNS HPF: 50 /HPF (ref 0–5)
WBC #/AREA URNS HPF: >100 /HPF (ref 0–5)
WBC CASTS #/AREA URNS LPF: 1 /LPF
YEAST URNS QL MICRO: ABNORMAL

## 2018-01-01 PROCEDURE — 85520 HEPARIN ASSAY: CPT

## 2018-01-01 PROCEDURE — 25000003 PHARM REV CODE 250: Performed by: STUDENT IN AN ORGANIZED HEALTH CARE EDUCATION/TRAINING PROGRAM

## 2018-01-01 PROCEDURE — 63600175 PHARM REV CODE 636 W HCPCS: Performed by: STUDENT IN AN ORGANIZED HEALTH CARE EDUCATION/TRAINING PROGRAM

## 2018-01-01 PROCEDURE — 82803 BLOOD GASES ANY COMBINATION: CPT

## 2018-01-01 PROCEDURE — 25000003 PHARM REV CODE 250: Performed by: FAMILY MEDICINE

## 2018-01-01 PROCEDURE — 93005 ELECTROCARDIOGRAM TRACING: CPT

## 2018-01-01 PROCEDURE — 87088 URINE BACTERIA CULTURE: CPT

## 2018-01-01 PROCEDURE — 94640 AIRWAY INHALATION TREATMENT: CPT

## 2018-01-01 PROCEDURE — C9113 INJ PANTOPRAZOLE SODIUM, VIA: HCPCS | Performed by: STUDENT IN AN ORGANIZED HEALTH CARE EDUCATION/TRAINING PROGRAM

## 2018-01-01 PROCEDURE — 99152 MOD SED SAME PHYS/QHP 5/>YRS: CPT | Mod: ,,, | Performed by: INTERNAL MEDICINE

## 2018-01-01 PROCEDURE — 94761 N-INVAS EAR/PLS OXIMETRY MLT: CPT

## 2018-01-01 PROCEDURE — 25000242 PHARM REV CODE 250 ALT 637 W/ HCPCS: Performed by: STUDENT IN AN ORGANIZED HEALTH CARE EDUCATION/TRAINING PROGRAM

## 2018-01-01 PROCEDURE — 25000003 PHARM REV CODE 250: Performed by: INTERNAL MEDICINE

## 2018-01-01 PROCEDURE — 25000003 PHARM REV CODE 250

## 2018-01-01 PROCEDURE — 97530 THERAPEUTIC ACTIVITIES: CPT

## 2018-01-01 PROCEDURE — 83735 ASSAY OF MAGNESIUM: CPT

## 2018-01-01 PROCEDURE — 85730 THROMBOPLASTIN TIME PARTIAL: CPT

## 2018-01-01 PROCEDURE — 97535 SELF CARE MNGMENT TRAINING: CPT

## 2018-01-01 PROCEDURE — 80053 COMPREHEN METABOLIC PANEL: CPT

## 2018-01-01 PROCEDURE — 96365 THER/PROPH/DIAG IV INF INIT: CPT

## 2018-01-01 PROCEDURE — 37211 THROMBOLYTIC ART THERAPY: CPT | Mod: 50

## 2018-01-01 PROCEDURE — 84443 ASSAY THYROID STIM HORMONE: CPT

## 2018-01-01 PROCEDURE — 27000221 HC OXYGEN, UP TO 24 HOURS

## 2018-01-01 PROCEDURE — 36415 COLL VENOUS BLD VENIPUNCTURE: CPT

## 2018-01-01 PROCEDURE — 0W993ZZ DRAINAGE OF RIGHT PLEURAL CAVITY, PERCUTANEOUS APPROACH: ICD-10-PCS | Performed by: INTERNAL MEDICINE

## 2018-01-01 PROCEDURE — G8979 MOBILITY GOAL STATUS: HCPCS | Mod: CK

## 2018-01-01 PROCEDURE — 83615 LACTATE (LD) (LDH) ENZYME: CPT

## 2018-01-01 PROCEDURE — 97803 MED NUTRITION INDIV SUBSEQ: CPT

## 2018-01-01 PROCEDURE — 93306 TTE W/DOPPLER COMPLETE: CPT

## 2018-01-01 PROCEDURE — 97110 THERAPEUTIC EXERCISES: CPT

## 2018-01-01 PROCEDURE — 85025 COMPLETE CBC W/AUTO DIFF WBC: CPT

## 2018-01-01 PROCEDURE — 92526 ORAL FUNCTION THERAPY: CPT

## 2018-01-01 PROCEDURE — 88305 TISSUE EXAM BY PATHOLOGIST: CPT | Mod: 26,,, | Performed by: PATHOLOGY

## 2018-01-01 PROCEDURE — 37211 THROMBOLYTIC ART THERAPY: CPT | Mod: 50,,, | Performed by: INTERNAL MEDICINE

## 2018-01-01 PROCEDURE — 87086 URINE CULTURE/COLONY COUNT: CPT

## 2018-01-01 PROCEDURE — 25000003 PHARM REV CODE 250: Performed by: NURSE PRACTITIONER

## 2018-01-01 PROCEDURE — 93306 TTE W/DOPPLER COMPLETE: CPT | Mod: 26,,, | Performed by: INTERNAL MEDICINE

## 2018-01-01 PROCEDURE — 87070 CULTURE OTHR SPECIMN AEROBIC: CPT

## 2018-01-01 PROCEDURE — 97802 MEDICAL NUTRITION INDIV IN: CPT

## 2018-01-01 PROCEDURE — 84311 SPECTROPHOTOMETRY: CPT

## 2018-01-01 PROCEDURE — 96367 TX/PROPH/DG ADDL SEQ IV INF: CPT

## 2018-01-01 PROCEDURE — 85027 COMPLETE CBC AUTOMATED: CPT

## 2018-01-01 PROCEDURE — 86704 HEP B CORE ANTIBODY TOTAL: CPT

## 2018-01-01 PROCEDURE — 99233 SBSQ HOSP IP/OBS HIGH 50: CPT | Mod: ,,, | Performed by: NURSE PRACTITIONER

## 2018-01-01 PROCEDURE — 63600175 PHARM REV CODE 636 W HCPCS: Performed by: FAMILY MEDICINE

## 2018-01-01 PROCEDURE — 80202 ASSAY OF VANCOMYCIN: CPT

## 2018-01-01 PROCEDURE — 3E06317 INTRODUCTION OF OTHER THROMBOLYTIC INTO CENTRAL ARTERY, PERCUTANEOUS APPROACH: ICD-10-PCS | Performed by: INTERNAL MEDICINE

## 2018-01-01 PROCEDURE — 63600175 PHARM REV CODE 636 W HCPCS: Performed by: EMERGENCY MEDICINE

## 2018-01-01 PROCEDURE — 83615 LACTATE (LD) (LDH) ENZYME: CPT | Mod: 91

## 2018-01-01 PROCEDURE — 25000003 PHARM REV CODE 250: Performed by: EMERGENCY MEDICINE

## 2018-01-01 PROCEDURE — 87186 SC STD MICRODIL/AGAR DIL: CPT

## 2018-01-01 PROCEDURE — 94660 CPAP INITIATION&MGMT: CPT

## 2018-01-01 PROCEDURE — 20000000 HC ICU ROOM

## 2018-01-01 PROCEDURE — 85730 THROMBOPLASTIN TIME PARTIAL: CPT | Mod: 91

## 2018-01-01 PROCEDURE — 89051 BODY FLUID CELL COUNT: CPT

## 2018-01-01 PROCEDURE — 99233 SBSQ HOSP IP/OBS HIGH 50: CPT | Mod: ,,, | Performed by: INTERNAL MEDICINE

## 2018-01-01 PROCEDURE — 32555 ASPIRATE PLEURA W/ IMAGING: CPT | Mod: RT,GC,, | Performed by: INTERNAL MEDICINE

## 2018-01-01 PROCEDURE — 87040 BLOOD CULTURE FOR BACTERIA: CPT

## 2018-01-01 PROCEDURE — G8996 SWALLOW CURRENT STATUS: HCPCS | Mod: CI

## 2018-01-01 PROCEDURE — 87077 CULTURE AEROBIC IDENTIFY: CPT

## 2018-01-01 PROCEDURE — 27000190 HC CPAP FULL FACE MASK W/VALVE

## 2018-01-01 PROCEDURE — 63600175 PHARM REV CODE 636 W HCPCS: Performed by: INTERNAL MEDICINE

## 2018-01-01 PROCEDURE — G8978 MOBILITY CURRENT STATUS: HCPCS | Mod: CM

## 2018-01-01 PROCEDURE — G8997 SWALLOW GOAL STATUS: HCPCS | Mod: CI

## 2018-01-01 PROCEDURE — 97161 PT EVAL LOW COMPLEX 20 MIN: CPT

## 2018-01-01 PROCEDURE — 97165 OT EVAL LOW COMPLEX 30 MIN: CPT

## 2018-01-01 PROCEDURE — 88305 TISSUE EXAM BY PATHOLOGIST: CPT | Performed by: PATHOLOGY

## 2018-01-01 PROCEDURE — 87340 HEPATITIS B SURFACE AG IA: CPT

## 2018-01-01 PROCEDURE — 99221 1ST HOSP IP/OBS SF/LOW 40: CPT | Mod: 25,GC,, | Performed by: INTERNAL MEDICINE

## 2018-01-01 PROCEDURE — 99223 1ST HOSP IP/OBS HIGH 75: CPT | Mod: ,,, | Performed by: FAMILY MEDICINE

## 2018-01-01 PROCEDURE — G8998 SWALLOW D/C STATUS: HCPCS | Mod: CI

## 2018-01-01 PROCEDURE — 82533 TOTAL CORTISOL: CPT

## 2018-01-01 PROCEDURE — 88112 CYTOPATH CELL ENHANCE TECH: CPT | Mod: 26,,, | Performed by: PATHOLOGY

## 2018-01-01 PROCEDURE — C1769 GUIDE WIRE: HCPCS

## 2018-01-01 PROCEDURE — 93010 ELECTROCARDIOGRAM REPORT: CPT | Mod: ,,, | Performed by: INTERNAL MEDICINE

## 2018-01-01 PROCEDURE — 84100 ASSAY OF PHOSPHORUS: CPT

## 2018-01-01 PROCEDURE — 25500020 PHARM REV CODE 255: Performed by: EMERGENCY MEDICINE

## 2018-01-01 PROCEDURE — 87106 FUNGI IDENTIFICATION YEAST: CPT

## 2018-01-01 PROCEDURE — 63600175 PHARM REV CODE 636 W HCPCS

## 2018-01-01 PROCEDURE — 25500020 PHARM REV CODE 255

## 2018-01-01 PROCEDURE — 83880 ASSAY OF NATRIURETIC PEPTIDE: CPT

## 2018-01-01 PROCEDURE — 83605 ASSAY OF LACTIC ACID: CPT

## 2018-01-01 PROCEDURE — 84484 ASSAY OF TROPONIN QUANT: CPT

## 2018-01-01 PROCEDURE — 96366 THER/PROPH/DIAG IV INF ADDON: CPT

## 2018-01-01 PROCEDURE — 83605 ASSAY OF LACTIC ACID: CPT | Mod: 91

## 2018-01-01 PROCEDURE — 36014 PLACE CATHETER IN ARTERY: CPT | Mod: 51,LT,, | Performed by: INTERNAL MEDICINE

## 2018-01-01 PROCEDURE — 81000 URINALYSIS NONAUTO W/SCOPE: CPT

## 2018-01-01 PROCEDURE — 87350 HEPATITIS BE AG IA: CPT

## 2018-01-01 PROCEDURE — 82150 ASSAY OF AMYLASE: CPT

## 2018-01-01 PROCEDURE — 87205 SMEAR GRAM STAIN: CPT

## 2018-01-01 PROCEDURE — G8980 MOBILITY D/C STATUS: HCPCS | Mod: CL

## 2018-01-01 PROCEDURE — 99223 1ST HOSP IP/OBS HIGH 75: CPT | Mod: 57,GC,, | Performed by: NURSE PRACTITIONER

## 2018-01-01 PROCEDURE — 51702 INSERT TEMP BLADDER CATH: CPT

## 2018-01-01 PROCEDURE — 85007 BL SMEAR W/DIFF WBC COUNT: CPT

## 2018-01-01 PROCEDURE — 85610 PROTHROMBIN TIME: CPT

## 2018-01-01 PROCEDURE — 99291 CRITICAL CARE FIRST HOUR: CPT | Mod: 25

## 2018-01-01 PROCEDURE — 86580 TB INTRADERMAL TEST: CPT | Performed by: STUDENT IN AN ORGANIZED HEALTH CARE EDUCATION/TRAINING PROGRAM

## 2018-01-01 PROCEDURE — 84157 ASSAY OF PROTEIN OTHER: CPT

## 2018-01-01 PROCEDURE — 36600 WITHDRAWAL OF ARTERIAL BLOOD: CPT

## 2018-01-01 PROCEDURE — 96375 TX/PRO/DX INJ NEW DRUG ADDON: CPT

## 2018-01-01 PROCEDURE — 92610 EVALUATE SWALLOWING FUNCTION: CPT

## 2018-01-01 RX ORDER — DILTIAZEM HYDROCHLORIDE 30 MG/1
30 TABLET, FILM COATED ORAL EVERY 6 HOURS
Status: DISCONTINUED | OUTPATIENT
Start: 2018-01-01 | End: 2018-01-01

## 2018-01-01 RX ORDER — OXYCODONE AND ACETAMINOPHEN 5; 325 MG/1; MG/1
1 TABLET ORAL EVERY 6 HOURS PRN
Status: DISCONTINUED | OUTPATIENT
Start: 2018-01-01 | End: 2018-01-01

## 2018-01-01 RX ORDER — POTASSIUM CHLORIDE 20 MEQ/15ML
40 SOLUTION ORAL ONCE
Status: COMPLETED | OUTPATIENT
Start: 2018-01-01 | End: 2018-01-01

## 2018-01-01 RX ORDER — FUROSEMIDE 10 MG/ML
40 INJECTION INTRAMUSCULAR; INTRAVENOUS 2 TIMES DAILY
Status: DISCONTINUED | OUTPATIENT
Start: 2018-01-01 | End: 2018-01-01

## 2018-01-01 RX ORDER — IBUPROFEN 400 MG/1
400 TABLET ORAL EVERY 6 HOURS PRN
Status: DISCONTINUED | OUTPATIENT
Start: 2018-01-01 | End: 2018-01-01

## 2018-01-01 RX ORDER — KETOROLAC TROMETHAMINE 30 MG/ML
15 INJECTION, SOLUTION INTRAMUSCULAR; INTRAVENOUS ONCE
Status: COMPLETED | OUTPATIENT
Start: 2018-01-01 | End: 2018-01-01

## 2018-01-01 RX ORDER — SODIUM CHLORIDE 9 MG/ML
INJECTION, SOLUTION INTRAVENOUS CONTINUOUS
Status: DISCONTINUED | OUTPATIENT
Start: 2018-01-01 | End: 2018-01-01

## 2018-01-01 RX ORDER — DEXTROSE MONOHYDRATE, SODIUM CHLORIDE, AND POTASSIUM CHLORIDE 50; 1.49; 4.5 G/1000ML; G/1000ML; G/1000ML
INJECTION, SOLUTION INTRAVENOUS CONTINUOUS
Status: DISCONTINUED | OUTPATIENT
Start: 2018-01-01 | End: 2018-01-01

## 2018-01-01 RX ORDER — SODIUM CHLORIDE 0.9 % (FLUSH) 0.9 %
3 SYRINGE (ML) INJECTION
Status: DISCONTINUED | OUTPATIENT
Start: 2018-01-01 | End: 2018-01-01 | Stop reason: HOSPADM

## 2018-01-01 RX ORDER — SODIUM CHLORIDE AND POTASSIUM CHLORIDE 150; 450 MG/100ML; MG/100ML
INJECTION, SOLUTION INTRAVENOUS CONTINUOUS
Status: DISCONTINUED | OUTPATIENT
Start: 2018-01-01 | End: 2018-01-01

## 2018-01-01 RX ORDER — DEXTROSE MONOHYDRATE, SODIUM CHLORIDE, AND POTASSIUM CHLORIDE 50; 1.49; 4.5 G/1000ML; G/1000ML; G/1000ML
INJECTION, SOLUTION INTRAVENOUS CONTINUOUS
Status: DISPENSED | OUTPATIENT
Start: 2018-01-01 | End: 2018-01-01

## 2018-01-01 RX ORDER — FUROSEMIDE 10 MG/ML
20 INJECTION INTRAMUSCULAR; INTRAVENOUS ONCE
Status: DISCONTINUED | OUTPATIENT
Start: 2018-01-01 | End: 2018-01-01

## 2018-01-01 RX ORDER — MORPHINE SULFATE 4 MG/ML
3 INJECTION, SOLUTION INTRAMUSCULAR; INTRAVENOUS
Status: DISCONTINUED | OUTPATIENT
Start: 2018-01-01 | End: 2018-01-01

## 2018-01-01 RX ORDER — AZITHROMYCIN 250 MG/1
500 TABLET, FILM COATED ORAL DAILY
Status: COMPLETED | OUTPATIENT
Start: 2018-01-01 | End: 2018-01-01

## 2018-01-01 RX ORDER — OXYCODONE AND ACETAMINOPHEN 5; 325 MG/1; MG/1
1 TABLET ORAL EVERY 8 HOURS PRN
Status: DISCONTINUED | OUTPATIENT
Start: 2018-01-01 | End: 2018-01-01

## 2018-01-01 RX ORDER — HEPARIN SODIUM,PORCINE/D5W 25000/250
18 INTRAVENOUS SOLUTION INTRAVENOUS CONTINUOUS
Status: DISCONTINUED | OUTPATIENT
Start: 2018-01-01 | End: 2018-01-01

## 2018-01-01 RX ORDER — AMITRIPTYLINE HYDROCHLORIDE 10 MG/1
10 TABLET, FILM COATED ORAL NIGHTLY
Status: DISCONTINUED | OUTPATIENT
Start: 2018-01-01 | End: 2018-01-01

## 2018-01-01 RX ORDER — MORPHINE SULFATE 4 MG/ML
1 INJECTION, SOLUTION INTRAMUSCULAR; INTRAVENOUS
Status: DISCONTINUED | OUTPATIENT
Start: 2018-01-01 | End: 2018-01-01 | Stop reason: HOSPADM

## 2018-01-01 RX ORDER — METOPROLOL TARTRATE 1 MG/ML
5 INJECTION, SOLUTION INTRAVENOUS
Status: COMPLETED | OUTPATIENT
Start: 2018-01-01 | End: 2018-01-01

## 2018-01-01 RX ORDER — LIDOCAINE HYDROCHLORIDE 10 MG/ML
1 INJECTION INFILTRATION; PERINEURAL ONCE
Status: COMPLETED | OUTPATIENT
Start: 2018-01-01 | End: 2018-01-01

## 2018-01-01 RX ORDER — FUROSEMIDE 10 MG/ML
20 INJECTION INTRAMUSCULAR; INTRAVENOUS ONCE
Status: COMPLETED | OUTPATIENT
Start: 2018-01-01 | End: 2018-01-01

## 2018-01-01 RX ORDER — LUBIPROSTONE 24 UG/1
24 CAPSULE ORAL 2 TIMES DAILY WITH MEALS
Status: DISCONTINUED | OUTPATIENT
Start: 2018-01-01 | End: 2018-01-01

## 2018-01-01 RX ORDER — CETIRIZINE HYDROCHLORIDE 10 MG/1
10 TABLET ORAL DAILY
Status: DISCONTINUED | OUTPATIENT
Start: 2018-01-01 | End: 2018-01-01

## 2018-01-01 RX ORDER — LEVOTHYROXINE SODIUM 25 UG/1
25 TABLET ORAL
Status: DISCONTINUED | OUTPATIENT
Start: 2018-01-01 | End: 2018-01-01

## 2018-01-01 RX ORDER — GABAPENTIN 300 MG/1
600 CAPSULE ORAL 3 TIMES DAILY
Status: DISCONTINUED | OUTPATIENT
Start: 2018-01-01 | End: 2018-01-01

## 2018-01-01 RX ORDER — DULOXETIN HYDROCHLORIDE 30 MG/1
30 CAPSULE, DELAYED RELEASE ORAL DAILY
Status: DISCONTINUED | OUTPATIENT
Start: 2018-01-01 | End: 2018-01-01

## 2018-01-01 RX ORDER — IPRATROPIUM BROMIDE AND ALBUTEROL SULFATE 2.5; .5 MG/3ML; MG/3ML
3 SOLUTION RESPIRATORY (INHALATION) EVERY 6 HOURS PRN
Status: DISCONTINUED | OUTPATIENT
Start: 2018-01-01 | End: 2018-01-01

## 2018-01-01 RX ORDER — OXYCODONE AND ACETAMINOPHEN 5; 325 MG/1; MG/1
1 TABLET ORAL ONCE
Status: COMPLETED | OUTPATIENT
Start: 2018-01-01 | End: 2018-01-01

## 2018-01-01 RX ORDER — SIMETHICONE 125 MG
125 TABLET,CHEWABLE ORAL EVERY 6 HOURS PRN
Status: DISCONTINUED | OUTPATIENT
Start: 2018-01-01 | End: 2018-01-01

## 2018-01-01 RX ORDER — BENZONATATE 100 MG/1
100 CAPSULE ORAL 3 TIMES DAILY PRN
Status: DISCONTINUED | OUTPATIENT
Start: 2018-01-01 | End: 2018-01-01

## 2018-01-01 RX ORDER — DEXTROMETHORPHAN/PSEUDOEPHED 2.5-7.5/.8
40 DROPS ORAL 4 TIMES DAILY PRN
Status: DISCONTINUED | OUTPATIENT
Start: 2018-01-01 | End: 2018-01-01

## 2018-01-01 RX ORDER — AMITRIPTYLINE HYDROCHLORIDE 10 MG/1
10 TABLET, FILM COATED ORAL 2 TIMES DAILY
Status: DISCONTINUED | OUTPATIENT
Start: 2018-01-01 | End: 2018-01-01

## 2018-01-01 RX ORDER — FUROSEMIDE 10 MG/ML
40 INJECTION INTRAMUSCULAR; INTRAVENOUS ONCE
Status: DISCONTINUED | OUTPATIENT
Start: 2018-01-01 | End: 2018-01-01

## 2018-01-01 RX ORDER — CEFTRIAXONE 1 G/1
1 INJECTION, POWDER, FOR SOLUTION INTRAMUSCULAR; INTRAVENOUS
Status: DISCONTINUED | OUTPATIENT
Start: 2018-01-01 | End: 2018-01-01

## 2018-01-01 RX ORDER — METOPROLOL TARTRATE 1 MG/ML
5 INJECTION, SOLUTION INTRAVENOUS ONCE
Status: COMPLETED | OUTPATIENT
Start: 2018-01-01 | End: 2018-01-01

## 2018-01-01 RX ORDER — DIGOXIN 0.25 MG/ML
50 INJECTION INTRAMUSCULAR; INTRAVENOUS ONCE
Status: COMPLETED | OUTPATIENT
Start: 2018-01-01 | End: 2018-01-01

## 2018-01-01 RX ORDER — LIDOCAINE 50 MG/G
1 PATCH TOPICAL
Status: DISCONTINUED | OUTPATIENT
Start: 2018-01-01 | End: 2018-01-01

## 2018-01-01 RX ORDER — FUROSEMIDE 10 MG/ML
40 INJECTION INTRAMUSCULAR; INTRAVENOUS ONCE
Status: COMPLETED | OUTPATIENT
Start: 2018-01-01 | End: 2018-01-01

## 2018-01-01 RX ORDER — ASPIRIN 325 MG
325 TABLET ORAL DAILY
Status: DISCONTINUED | OUTPATIENT
Start: 2018-01-01 | End: 2018-01-01

## 2018-01-01 RX ORDER — AMIODARONE HYDROCHLORIDE 200 MG/1
200 TABLET ORAL 2 TIMES DAILY
Status: DISCONTINUED | OUTPATIENT
Start: 2018-01-01 | End: 2018-01-01

## 2018-01-01 RX ORDER — ACETAMINOPHEN 325 MG/1
650 TABLET ORAL EVERY 6 HOURS PRN
Status: DISCONTINUED | OUTPATIENT
Start: 2018-01-01 | End: 2018-01-01

## 2018-01-01 RX ORDER — LIDOCAINE HYDROCHLORIDE 10 MG/ML
INJECTION INFILTRATION; PERINEURAL
Status: COMPLETED
Start: 2018-01-01 | End: 2018-01-01

## 2018-01-01 RX ORDER — BISACODYL 10 MG
10 SUPPOSITORY, RECTAL RECTAL DAILY PRN
Status: DISCONTINUED | OUTPATIENT
Start: 2018-01-01 | End: 2018-01-01

## 2018-01-01 RX ORDER — OXYCODONE AND ACETAMINOPHEN 5; 325 MG/1; MG/1
TABLET ORAL
Status: DISPENSED
Start: 2018-01-01 | End: 2018-01-01

## 2018-01-01 RX ORDER — SODIUM CHLORIDE 9 MG/ML
INJECTION, SOLUTION INTRAVENOUS CONTINUOUS
Status: ACTIVE | OUTPATIENT
Start: 2018-01-01 | End: 2018-01-01

## 2018-01-01 RX ORDER — POTASSIUM CHLORIDE 20 MEQ/1
40 TABLET, EXTENDED RELEASE ORAL ONCE
Status: COMPLETED | OUTPATIENT
Start: 2018-01-01 | End: 2018-01-01

## 2018-01-01 RX ORDER — HYDROXYZINE HYDROCHLORIDE 25 MG/1
50 TABLET, FILM COATED ORAL 3 TIMES DAILY PRN
Status: DISCONTINUED | OUTPATIENT
Start: 2018-01-01 | End: 2018-01-01

## 2018-01-01 RX ORDER — LORAZEPAM 1 MG/1
1 TABLET ORAL EVERY 30 MIN PRN
Status: DISCONTINUED | OUTPATIENT
Start: 2018-01-01 | End: 2018-01-01

## 2018-01-01 RX ORDER — FUROSEMIDE 10 MG/ML
80 INJECTION INTRAMUSCULAR; INTRAVENOUS 2 TIMES DAILY
Status: DISCONTINUED | OUTPATIENT
Start: 2018-01-01 | End: 2018-01-01

## 2018-01-01 RX ORDER — METOPROLOL SUCCINATE 25 MG/1
25 TABLET, EXTENDED RELEASE ORAL DAILY
Status: DISCONTINUED | OUTPATIENT
Start: 2018-01-01 | End: 2018-01-01

## 2018-01-01 RX ORDER — PANTOPRAZOLE SODIUM 40 MG/1
40 TABLET, DELAYED RELEASE ORAL DAILY
Status: DISCONTINUED | OUTPATIENT
Start: 2018-01-01 | End: 2018-01-01

## 2018-01-01 RX ORDER — METOPROLOL TARTRATE 25 MG/1
25 TABLET, FILM COATED ORAL 2 TIMES DAILY
Status: DISCONTINUED | OUTPATIENT
Start: 2018-01-01 | End: 2018-01-01

## 2018-01-01 RX ORDER — FUROSEMIDE 10 MG/ML
80 INJECTION INTRAMUSCULAR; INTRAVENOUS ONCE
Status: DISCONTINUED | OUTPATIENT
Start: 2018-01-01 | End: 2018-01-01

## 2018-01-01 RX ORDER — IPRATROPIUM BROMIDE AND ALBUTEROL SULFATE 2.5; .5 MG/3ML; MG/3ML
3 SOLUTION RESPIRATORY (INHALATION) EVERY 6 HOURS
Status: DISCONTINUED | OUTPATIENT
Start: 2018-01-01 | End: 2018-01-01

## 2018-01-01 RX ORDER — HEPARIN SODIUM 5000 [USP'U]/ML
5000 INJECTION, SOLUTION INTRAVENOUS; SUBCUTANEOUS EVERY 8 HOURS
Status: DISCONTINUED | OUTPATIENT
Start: 2018-01-01 | End: 2018-01-01

## 2018-01-01 RX ADMIN — IPRATROPIUM BROMIDE AND ALBUTEROL SULFATE 3 ML: .5; 3 SOLUTION RESPIRATORY (INHALATION) at 01:05

## 2018-01-01 RX ADMIN — OXYCODONE AND ACETAMINOPHEN 1 TABLET: 5; 325 TABLET ORAL at 01:05

## 2018-01-01 RX ADMIN — OXYCODONE HYDROCHLORIDE AND ACETAMINOPHEN 1 TABLET: 5; 325 TABLET ORAL at 03:05

## 2018-01-01 RX ADMIN — APIXABAN 10 MG: 2.5 TABLET, FILM COATED ORAL at 09:05

## 2018-01-01 RX ADMIN — IPRATROPIUM BROMIDE AND ALBUTEROL SULFATE 3 ML: .5; 3 SOLUTION RESPIRATORY (INHALATION) at 07:05

## 2018-01-01 RX ADMIN — ASPIRIN 325 MG ORAL TABLET 325 MG: 325 PILL ORAL at 08:05

## 2018-01-01 RX ADMIN — GABAPENTIN 600 MG: 300 CAPSULE ORAL at 09:05

## 2018-01-01 RX ADMIN — FUROSEMIDE 40 MG: 10 INJECTION, SOLUTION INTRAMUSCULAR; INTRAVENOUS at 10:05

## 2018-01-01 RX ADMIN — AMIODARONE HYDROCHLORIDE 200 MG: 200 TABLET ORAL at 08:05

## 2018-01-01 RX ADMIN — AMITRIPTYLINE HYDROCHLORIDE 10 MG: 10 TABLET, FILM COATED ORAL at 08:05

## 2018-01-01 RX ADMIN — LUBIPROSTONE 24 MCG: 24 CAPSULE, GELATIN COATED ORAL at 05:05

## 2018-01-01 RX ADMIN — AZITHROMYCIN 500 MG: 250 TABLET, FILM COATED ORAL at 09:05

## 2018-01-01 RX ADMIN — PIPERACILLIN SODIUM AND TAZOBACTAM SODIUM 4.5 G: 4; .5 INJECTION, POWDER, FOR SOLUTION INTRAVENOUS at 03:05

## 2018-01-01 RX ADMIN — IPRATROPIUM BROMIDE AND ALBUTEROL SULFATE 3 ML: .5; 3 SOLUTION RESPIRATORY (INHALATION) at 11:05

## 2018-01-01 RX ADMIN — GABAPENTIN 600 MG: 300 CAPSULE ORAL at 08:05

## 2018-01-01 RX ADMIN — FUROSEMIDE 40 MG: 10 INJECTION, SOLUTION INTRAMUSCULAR; INTRAVENOUS at 09:05

## 2018-01-01 RX ADMIN — BENZONATATE 100 MG: 100 CAPSULE ORAL at 08:05

## 2018-01-01 RX ADMIN — IPRATROPIUM BROMIDE AND ALBUTEROL SULFATE 3 ML: .5; 3 SOLUTION RESPIRATORY (INHALATION) at 08:05

## 2018-01-01 RX ADMIN — DEXTROSE MONOHYDRATE, SODIUM CHLORIDE, AND POTASSIUM CHLORIDE: 50; 4.5; 1.49 INJECTION, SOLUTION INTRAVENOUS at 06:05

## 2018-01-01 RX ADMIN — IPRATROPIUM BROMIDE AND ALBUTEROL SULFATE 3 ML: .5; 3 SOLUTION RESPIRATORY (INHALATION) at 12:05

## 2018-01-01 RX ADMIN — HEPARIN SODIUM AND DEXTROSE 20 UNITS/KG/HR: 10000; 5 INJECTION INTRAVENOUS at 12:05

## 2018-01-01 RX ADMIN — LUBIPROSTONE 24 MCG: 24 CAPSULE, GELATIN COATED ORAL at 04:05

## 2018-01-01 RX ADMIN — APIXABAN 10 MG: 2.5 TABLET, FILM COATED ORAL at 08:05

## 2018-01-01 RX ADMIN — DEXTROSE MONOHYDRATE, SODIUM CHLORIDE, AND POTASSIUM CHLORIDE: 50; 4.5; 1.49 INJECTION, SOLUTION INTRAVENOUS at 03:05

## 2018-01-01 RX ADMIN — LEVOTHYROXINE SODIUM 25 MCG: 25 TABLET ORAL at 05:05

## 2018-01-01 RX ADMIN — LUBIPROSTONE 24 MCG: 24 CAPSULE, GELATIN COATED ORAL at 08:05

## 2018-01-01 RX ADMIN — POTASSIUM CHLORIDE 40 MEQ: 20 SOLUTION ORAL at 08:05

## 2018-01-01 RX ADMIN — GABAPENTIN 600 MG: 300 CAPSULE ORAL at 03:05

## 2018-01-01 RX ADMIN — SODIUM CHLORIDE 500 ML: 9 INJECTION, SOLUTION INTRAVENOUS at 10:05

## 2018-01-01 RX ADMIN — ACETAMINOPHEN 650 MG: 325 TABLET ORAL at 02:05

## 2018-01-01 RX ADMIN — SIMETHICONE 40 MG: 20 SUSPENSION/ DROPS ORAL at 10:05

## 2018-01-01 RX ADMIN — LIDOCAINE 1 PATCH: 50 PATCH TOPICAL at 04:05

## 2018-01-01 RX ADMIN — VANCOMYCIN HYDROCHLORIDE 1000 MG: 1 INJECTION, POWDER, LYOPHILIZED, FOR SOLUTION INTRAVENOUS at 12:05

## 2018-01-01 RX ADMIN — IPRATROPIUM BROMIDE AND ALBUTEROL SULFATE 3 ML: .5; 3 SOLUTION RESPIRATORY (INHALATION) at 06:05

## 2018-01-01 RX ADMIN — PIPERACILLIN SODIUM AND TAZOBACTAM SODIUM 4.5 G: 4; .5 INJECTION, POWDER, FOR SOLUTION INTRAVENOUS at 06:05

## 2018-01-01 RX ADMIN — IBUPROFEN 400 MG: 400 TABLET, FILM COATED ORAL at 12:05

## 2018-01-01 RX ADMIN — DEXTROSE MONOHYDRATE, SODIUM CHLORIDE, AND POTASSIUM CHLORIDE: 50; 4.5; 1.49 INJECTION, SOLUTION INTRAVENOUS at 04:05

## 2018-01-01 RX ADMIN — DEXTROSE 40 MG: 50 INJECTION, SOLUTION INTRAVENOUS at 08:05

## 2018-01-01 RX ADMIN — SODIUM CHLORIDE 500 ML: 9 INJECTION, SOLUTION INTRAVENOUS at 09:05

## 2018-01-01 RX ADMIN — OXYCODONE AND ACETAMINOPHEN 1 TABLET: 5; 325 TABLET ORAL at 06:05

## 2018-01-01 RX ADMIN — IBUPROFEN 400 MG: 400 TABLET, FILM COATED ORAL at 04:05

## 2018-01-01 RX ADMIN — IPRATROPIUM BROMIDE AND ALBUTEROL SULFATE 3 ML: .5; 3 SOLUTION RESPIRATORY (INHALATION) at 10:05

## 2018-01-01 RX ADMIN — OXYCODONE AND ACETAMINOPHEN 1 TABLET: 5; 325 TABLET ORAL at 05:05

## 2018-01-01 RX ADMIN — AMIODARONE HYDROCHLORIDE 200 MG: 200 TABLET ORAL at 09:05

## 2018-01-01 RX ADMIN — OXYCODONE HYDROCHLORIDE AND ACETAMINOPHEN 1 TABLET: 5; 325 TABLET ORAL at 09:05

## 2018-01-01 RX ADMIN — PANTOPRAZOLE SODIUM 40 MG: 40 TABLET, DELAYED RELEASE ORAL at 10:05

## 2018-01-01 RX ADMIN — PIPERACILLIN SODIUM AND TAZOBACTAM SODIUM 4.5 G: 4; .5 INJECTION, POWDER, FOR SOLUTION INTRAVENOUS at 10:05

## 2018-01-01 RX ADMIN — OXYCODONE HYDROCHLORIDE AND ACETAMINOPHEN 1 TABLET: 5; 325 TABLET ORAL at 08:05

## 2018-01-01 RX ADMIN — PIPERACILLIN AND TAZOBACTAM 4.5 G: 4; .5 INJECTION, POWDER, LYOPHILIZED, FOR SOLUTION INTRAVENOUS; PARENTERAL at 11:05

## 2018-01-01 RX ADMIN — LIDOCAINE 1 PATCH: 50 PATCH TOPICAL at 03:05

## 2018-01-01 RX ADMIN — DULOXETINE 30 MG: 30 CAPSULE, DELAYED RELEASE ORAL at 09:05

## 2018-01-01 RX ADMIN — PIPERACILLIN SODIUM AND TAZOBACTAM SODIUM 4.5 G: 4; .5 INJECTION, POWDER, FOR SOLUTION INTRAVENOUS at 02:05

## 2018-01-01 RX ADMIN — KETOROLAC TROMETHAMINE 15 MG: 30 INJECTION, SOLUTION INTRAMUSCULAR at 10:05

## 2018-01-01 RX ADMIN — SODIUM CHLORIDE AND POTASSIUM CHLORIDE: 4.5; 1.49 INJECTION, SOLUTION INTRAVENOUS at 07:05

## 2018-01-01 RX ADMIN — LEVOTHYROXINE SODIUM 25 MCG: 25 TABLET ORAL at 06:05

## 2018-01-01 RX ADMIN — MORPHINE SULFATE 3 MG: 4 INJECTION INTRAVENOUS at 11:05

## 2018-01-01 RX ADMIN — HYDROXYZINE HYDROCHLORIDE 50 MG: 25 TABLET, FILM COATED ORAL at 06:05

## 2018-01-01 RX ADMIN — DEXTROSE 40 MG: 50 INJECTION, SOLUTION INTRAVENOUS at 09:05

## 2018-01-01 RX ADMIN — METOPROLOL TARTRATE 5 MG: 5 INJECTION INTRAVENOUS at 09:05

## 2018-01-01 RX ADMIN — HYDROXYZINE HYDROCHLORIDE 50 MG: 25 TABLET, FILM COATED ORAL at 04:05

## 2018-01-01 RX ADMIN — KETOROLAC TROMETHAMINE 15 MG: 30 INJECTION, SOLUTION INTRAMUSCULAR at 02:05

## 2018-01-01 RX ADMIN — HYDROXYZINE HYDROCHLORIDE 50 MG: 25 TABLET, FILM COATED ORAL at 09:05

## 2018-01-01 RX ADMIN — HYDROXYZINE HYDROCHLORIDE 50 MG: 25 TABLET, FILM COATED ORAL at 07:05

## 2018-01-01 RX ADMIN — AZITHROMYCIN MONOHYDRATE 500 MG: 500 INJECTION, POWDER, LYOPHILIZED, FOR SOLUTION INTRAVENOUS at 02:05

## 2018-01-01 RX ADMIN — LUBIPROSTONE 24 MCG: 24 CAPSULE, GELATIN COATED ORAL at 09:05

## 2018-01-01 RX ADMIN — MORPHINE SULFATE 3 MG: 4 INJECTION INTRAVENOUS at 01:05

## 2018-01-01 RX ADMIN — LIDOCAINE 1 PATCH: 50 PATCH TOPICAL at 02:05

## 2018-01-01 RX ADMIN — FUROSEMIDE 80 MG: 10 INJECTION, SOLUTION INTRAMUSCULAR; INTRAVENOUS at 11:05

## 2018-01-01 RX ADMIN — VANCOMYCIN HYDROCHLORIDE 750 MG: 750 INJECTION, POWDER, LYOPHILIZED, FOR SOLUTION INTRAVENOUS at 11:05

## 2018-01-01 RX ADMIN — HEPARIN SODIUM AND DEXTROSE 18 UNITS/KG/HR: 10000; 5 INJECTION INTRAVENOUS at 03:05

## 2018-01-01 RX ADMIN — IOHEXOL 100 ML: 350 INJECTION, SOLUTION INTRAVENOUS at 11:05

## 2018-01-01 RX ADMIN — CETIRIZINE HYDROCHLORIDE 10 MG: 10 TABLET, FILM COATED ORAL at 08:05

## 2018-01-01 RX ADMIN — CETIRIZINE HYDROCHLORIDE 10 MG: 10 TABLET, FILM COATED ORAL at 12:05

## 2018-01-01 RX ADMIN — AMIODARONE HYDROCHLORIDE 200 MG: 200 TABLET ORAL at 12:05

## 2018-01-01 RX ADMIN — OXYCODONE AND ACETAMINOPHEN 1 TABLET: 5; 325 TABLET ORAL at 08:05

## 2018-01-01 RX ADMIN — APIXABAN 10 MG: 2.5 TABLET, FILM COATED ORAL at 10:05

## 2018-01-01 RX ADMIN — DULOXETINE 30 MG: 30 CAPSULE, DELAYED RELEASE ORAL at 08:05

## 2018-01-01 RX ADMIN — SODIUM CHLORIDE 250 ML: 9 INJECTION, SOLUTION INTRAVENOUS at 01:05

## 2018-01-01 RX ADMIN — POTASSIUM CHLORIDE 40 MEQ: 20 TABLET, EXTENDED RELEASE ORAL at 12:05

## 2018-01-01 RX ADMIN — SODIUM CHLORIDE: 0.9 INJECTION, SOLUTION INTRAVENOUS at 03:05

## 2018-01-01 RX ADMIN — DILTIAZEM HYDROCHLORIDE 30 MG: 30 TABLET, FILM COATED ORAL at 06:05

## 2018-01-01 RX ADMIN — BISACODYL 10 MG: 10 SUPPOSITORY RECTAL at 05:05

## 2018-01-01 RX ADMIN — ACETAMINOPHEN 650 MG: 325 TABLET ORAL at 08:05

## 2018-01-01 RX ADMIN — HEPARIN SODIUM AND DEXTROSE 18 UNITS/KG/HR: 10000; 5 INJECTION INTRAVENOUS at 02:05

## 2018-01-01 RX ADMIN — SODIUM CHLORIDE 500 ML: 9 INJECTION, SOLUTION INTRAVENOUS at 12:05

## 2018-01-01 RX ADMIN — OXYCODONE AND ACETAMINOPHEN 1 TABLET: 5; 325 TABLET ORAL at 02:05

## 2018-01-01 RX ADMIN — OXYCODONE HYDROCHLORIDE AND ACETAMINOPHEN 1 TABLET: 5; 325 TABLET ORAL at 01:05

## 2018-01-01 RX ADMIN — MORPHINE SULFATE 3 MG: 4 INJECTION INTRAVENOUS at 12:05

## 2018-01-01 RX ADMIN — HEPARIN SODIUM AND DEXTROSE 18 UNITS/KG/HR: 10000; 5 INJECTION INTRAVENOUS at 12:05

## 2018-01-01 RX ADMIN — CEFTRIAXONE SODIUM 1 G: 1 INJECTION, POWDER, FOR SOLUTION INTRAMUSCULAR; INTRAVENOUS at 09:05

## 2018-01-01 RX ADMIN — APIXABAN 10 MG: 2.5 TABLET, FILM COATED ORAL at 12:05

## 2018-01-01 RX ADMIN — HEPARIN SODIUM AND DEXTROSE 22 UNITS/KG/HR: 10000; 5 INJECTION INTRAVENOUS at 06:05

## 2018-01-01 RX ADMIN — AZITHROMYCIN MONOHYDRATE 500 MG: 500 INJECTION, POWDER, LYOPHILIZED, FOR SOLUTION INTRAVENOUS at 07:05

## 2018-01-01 RX ADMIN — SODIUM CHLORIDE, SODIUM LACTATE, POTASSIUM CHLORIDE, AND CALCIUM CHLORIDE 500 ML: .6; .31; .03; .02 INJECTION, SOLUTION INTRAVENOUS at 11:05

## 2018-01-01 RX ADMIN — CETIRIZINE HYDROCHLORIDE 10 MG: 10 TABLET, FILM COATED ORAL at 09:05

## 2018-01-01 RX ADMIN — LIDOCAINE HYDROCHLORIDE 1 ML: 10 INJECTION INFILTRATION; PERINEURAL at 11:05

## 2018-01-01 RX ADMIN — ACETAMINOPHEN 650 MG: 325 TABLET ORAL at 11:05

## 2018-01-01 RX ADMIN — ASPIRIN 325 MG ORAL TABLET 325 MG: 325 PILL ORAL at 09:05

## 2018-01-01 RX ADMIN — AMITRIPTYLINE HYDROCHLORIDE 10 MG: 10 TABLET, FILM COATED ORAL at 09:05

## 2018-01-01 RX ADMIN — SODIUM CHLORIDE 1000 ML: 0.9 INJECTION, SOLUTION INTRAVENOUS at 09:05

## 2018-01-01 RX ADMIN — HEPARIN SODIUM AND DEXTROSE 22 UNITS/KG/HR: 10000; 5 INJECTION INTRAVENOUS at 01:05

## 2018-01-01 RX ADMIN — IBUPROFEN 400 MG: 400 TABLET, FILM COATED ORAL at 03:05

## 2018-01-01 RX ADMIN — ASPIRIN 325 MG ORAL TABLET 325 MG: 325 PILL ORAL at 12:05

## 2018-01-01 RX ADMIN — ACETAMINOPHEN 650 MG: 325 TABLET ORAL at 12:05

## 2018-01-01 RX ADMIN — GABAPENTIN 600 MG: 300 CAPSULE ORAL at 04:05

## 2018-01-01 RX ADMIN — HYDROXYZINE HYDROCHLORIDE 50 MG: 25 TABLET, FILM COATED ORAL at 05:05

## 2018-01-01 RX ADMIN — AZITHROMYCIN 500 MG: 250 TABLET, FILM COATED ORAL at 04:05

## 2018-01-01 RX ADMIN — SODIUM CHLORIDE 250 ML: 0.9 INJECTION, SOLUTION INTRAVENOUS at 08:05

## 2018-01-01 RX ADMIN — ACETAMINOPHEN 650 MG: 325 TABLET ORAL at 07:05

## 2018-01-01 RX ADMIN — AMITRIPTYLINE HYDROCHLORIDE 10 MG: 10 TABLET, FILM COATED ORAL at 12:05

## 2018-01-01 RX ADMIN — HEPARIN SODIUM AND DEXTROSE 18 UNITS/KG/HR: 10000; 5 INJECTION INTRAVENOUS at 04:05

## 2018-01-01 RX ADMIN — HEPARIN SODIUM AND DEXTROSE 22 UNITS/KG/HR: 10000; 5 INJECTION INTRAVENOUS at 05:05

## 2018-01-01 RX ADMIN — FUROSEMIDE 20 MG: 10 INJECTION, SOLUTION INTRAMUSCULAR; INTRAVENOUS at 10:05

## 2018-01-01 RX ADMIN — SODIUM CHLORIDE 500 ML: 9 INJECTION, SOLUTION INTRAVENOUS at 01:05

## 2018-01-01 RX ADMIN — DIGOXIN 50 MCG: 0.25 INJECTION INTRAMUSCULAR; INTRAVENOUS at 03:05

## 2018-01-01 RX ADMIN — MORPHINE SULFATE 0.5 MG/HR: 10 INJECTION INTRAVENOUS at 03:05

## 2018-01-01 RX ADMIN — SODIUM CHLORIDE: 0.9 INJECTION, SOLUTION INTRAVENOUS at 04:05

## 2018-01-01 RX ADMIN — OXYCODONE AND ACETAMINOPHEN 1 TABLET: 5; 325 TABLET ORAL at 09:05

## 2018-01-01 RX ADMIN — TUBERCULIN PURIFIED PROTEIN DERIVATIVE 5 UNITS: 5 INJECTION, SOLUTION INTRADERMAL at 03:05

## 2018-01-01 RX ADMIN — VANCOMYCIN HYDROCHLORIDE 1000 MG: 1 INJECTION, POWDER, LYOPHILIZED, FOR SOLUTION INTRAVENOUS at 11:05

## 2018-01-01 RX ADMIN — AMIODARONE HYDROCHLORIDE 150 MG: 1.5 INJECTION, SOLUTION INTRAVENOUS at 11:05

## 2018-01-01 RX ADMIN — METOPROLOL SUCCINATE 25 MG: 25 TABLET, EXTENDED RELEASE ORAL at 08:05

## 2018-01-01 RX ADMIN — PIPERACILLIN SODIUM AND TAZOBACTAM SODIUM 4.5 G: 4; .5 INJECTION, POWDER, FOR SOLUTION INTRAVENOUS at 11:05

## 2018-01-01 RX ADMIN — MORPHINE SULFATE 1 MG: 4 INJECTION INTRAVENOUS at 11:05

## 2018-01-01 RX ADMIN — METOPROLOL TARTRATE 5 MG: 5 INJECTION INTRAVENOUS at 04:05

## 2018-01-01 RX ADMIN — LIDOCAINE HYDROCHLORIDE 1 ML: 10 INJECTION, SOLUTION INFILTRATION; PERINEURAL at 11:05

## 2018-01-01 RX ADMIN — HYDROXYZINE HYDROCHLORIDE 50 MG: 25 TABLET, FILM COATED ORAL at 03:05

## 2018-01-01 RX ADMIN — PANTOPRAZOLE SODIUM 40 MG: 40 TABLET, DELAYED RELEASE ORAL at 08:05

## 2018-01-01 RX ADMIN — IPRATROPIUM BROMIDE AND ALBUTEROL SULFATE 3 ML: .5; 3 SOLUTION RESPIRATORY (INHALATION) at 04:05

## 2018-01-01 RX ADMIN — METOPROLOL TARTRATE 25 MG: 25 TABLET ORAL at 09:05

## 2018-01-01 RX ADMIN — OXYCODONE AND ACETAMINOPHEN 1 TABLET: 5; 325 TABLET ORAL at 10:05

## 2018-01-01 RX ADMIN — METOPROLOL SUCCINATE 25 MG: 25 TABLET, EXTENDED RELEASE ORAL at 06:05

## 2018-01-01 RX ADMIN — DULOXETINE 30 MG: 30 CAPSULE, DELAYED RELEASE ORAL at 12:05

## 2018-04-19 PROBLEM — I10 ESSENTIAL HYPERTENSION: Status: ACTIVE | Noted: 2018-01-01

## 2018-04-19 PROBLEM — A41.9 SEVERE SEPSIS: Status: ACTIVE | Noted: 2018-01-01

## 2018-04-19 PROBLEM — R65.20 SEVERE SEPSIS: Status: ACTIVE | Noted: 2018-01-01

## 2018-04-19 PROBLEM — I48.91 ATRIAL FIBRILLATION WITH RVR: Status: ACTIVE | Noted: 2018-01-01
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
## 2018-04-19 PROBLEM — E03.9 HYPOTHYROIDISM: Status: ACTIVE | Noted: 2018-01-01

## 2018-04-19 PROBLEM — N39.0 UTI (URINARY TRACT INFECTION): Status: ACTIVE | Noted: 2018-01-01

## 2018-04-19 PROBLEM — J18.9 RIGHT LOWER LOBE PNEUMONIA: Status: ACTIVE | Noted: 2018-01-01

## 2018-04-19 PROBLEM — D72.829 LEUKOCYTOSIS: Status: ACTIVE | Noted: 2018-01-01

## 2018-04-20 PROBLEM — I50.43 ACUTE ON CHRONIC COMBINED SYSTOLIC AND DIASTOLIC HEART FAILURE: Status: ACTIVE | Noted: 2018-01-01

## 2018-04-20 PROBLEM — A41.9 SEVERE SEPSIS: Status: RESOLVED | Noted: 2018-01-01 | Resolved: 2018-01-01

## 2018-04-20 PROBLEM — R65.20 SEVERE SEPSIS: Status: RESOLVED | Noted: 2018-01-01 | Resolved: 2018-01-01

## 2018-04-20 PROBLEM — D72.829 LEUKOCYTOSIS: Status: RESOLVED | Noted: 2018-01-01 | Resolved: 2018-01-01

## 2018-04-21 PROBLEM — D72.829 LEUKOCYTOSIS: Status: ACTIVE | Noted: 2018-01-01

## 2018-04-22 PROBLEM — G45.9 TIA DUE TO EMBOLISM: Status: ACTIVE | Noted: 2018-01-01

## 2018-04-22 PROBLEM — R33.9 URINE RETENTION: Status: ACTIVE | Noted: 2018-01-01

## 2018-04-22 PROBLEM — E87.6 HYPOKALEMIA: Status: ACTIVE | Noted: 2018-01-01

## 2018-04-22 PROBLEM — I74.9 TIA DUE TO EMBOLISM: Status: ACTIVE | Noted: 2018-01-01

## 2018-04-24 PROBLEM — B96.29 UTI DUE TO EXTENDED-SPECTRUM BETA LACTAMASE (ESBL) PRODUCING ESCHERICHIA COLI: Status: ACTIVE | Noted: 2018-01-01

## 2018-04-24 PROBLEM — Z16.12 UTI DUE TO EXTENDED-SPECTRUM BETA LACTAMASE (ESBL) PRODUCING ESCHERICHIA COLI: Status: ACTIVE | Noted: 2018-01-01

## 2018-04-26 PROBLEM — R09.02 HYPOXIA: Status: ACTIVE | Noted: 2018-01-01

## 2018-04-26 PROBLEM — D72.829 LEUKOCYTOSIS: Status: RESOLVED | Noted: 2018-01-01 | Resolved: 2018-01-01

## 2018-05-04 PROBLEM — A41.9 SEPSIS: Status: ACTIVE | Noted: 2018-01-01

## 2018-05-04 PROBLEM — I26.99 BILATERAL PULMONARY EMBOLISM: Status: ACTIVE | Noted: 2018-01-01

## 2018-05-04 PROBLEM — I50.42 CHRONIC COMBINED SYSTOLIC AND DIASTOLIC HEART FAILURE: Status: ACTIVE | Noted: 2018-01-01

## 2018-05-04 NOTE — ASSESSMENT & PLAN NOTE
"CT scan with evidence of pulmonary infarct and right heart strain  Given heparin loading dose, and on heparin drip in ICU while tracking ptt  Will follow-patient's clinical symptoms.    Cardiology on board, will f/u their recs  Extensive conversation between ER doctor and son, confusion about full code vs DNR as son would like to avoid "life support"."  It was left as full code.      "

## 2018-05-04 NOTE — PLAN OF CARE
Pa pressure measured by CIRO Doyle RN , to one eckos catheter  Pa pressures 40/31  Mean 35-39  , now noting distended jvd's ,  Dr Chowdary notified of results, 2nd eckos catheter, removed,  introducers to  Be discontinued,

## 2018-05-04 NOTE — HPI
78 yo female with HFrEF 45%, chronic AF not on OAC 2/2 falls, hx of ESBL UTI's, and recent admission for PNA. Patient presented from [facility] with left sided pleuritic chest pain and SOB.   CTA revealed extensive bilateral pulmonary embolisms with evidence of right heart strain and pulmonary infarcts.  She is hypotensive and tachycardic. WBC ct elevated but afebrile. Patient placed on heparin gtt and feeling better this AM. Satting well on 2LNC. Bedside 2DE with evidence of moderate right heart strain (unofficial read). US of BLE pending. BNP elevated at 415. HH 11/36. No history of bleeding. Cr normal. No known allergies. She is admitted to [facility]. Cardiology and ID consulted.   NPO for catheter directed thrombolysis this morning.

## 2018-05-04 NOTE — PROGRESS NOTES
"Vancomycin Dosing and Monitoring Pharmacy Protocol    Elena Patton is a 77 y.o. female    Height: 5' 3" (1.6 m)   Wt Readings from Last 1 Encounters:   05/03/18 49.9 kg (110 lb)     Ideal body weight: 52.4 kg (115 lb 8.3 oz)    Temp Readings from Last 3 Encounters:   05/03/18 98.2 °F (36.8 °C) (Oral)   04/26/18 96.5 °F (35.8 °C) (Oral)   12/11/17 97.3 °F (36.3 °C) (Oral)      Lab Results   Component Value Date/Time    WBC 21.15 (H) 05/03/2018 09:14 PM    WBC 10.94 04/26/2018 06:07 AM    WBC 12.33 04/25/2018 04:59 AM      Lab Results   Component Value Date/Time    CREATININE 0.8 05/03/2018 09:14 PM    CREATININE 1.03 04/26/2018 06:07 AM    CREATININE 1.23 04/25/2018 02:46 PM        Serum creatinine: 0.8 mg/dL 05/03/18 2114  Estimated creatinine clearance: 45.7 mL/min    Antibiotics       Start     Stop Route Frequency Ordered    05/03/18 2230  vancomycin 1 g in 0.9% sodium chloride 250 mL IVPB (ready to mix system)  (ED Adult Sepsis Treatment)      05/04 1029 IV ED 1 Time 05/03/18 2218    05/03/18 2215  piperacillin-tazobactam 4.5 g in dextrose 5 % 100 mL IVPB (ready to mix system)  (ED Adult Sepsis Treatment)      05/04 1014 IV ED 1 Time 05/03/18 2212          Antifungals       None            Microbiology Results (last 7 days)       Procedure Component Value Units Date/Time    Blood culture x two cultures. Draw prior to antibiotics. [[id number]]     Order Status:  No result Specimen:  Blood     Blood culture x two cultures. Draw prior to antibiotics. [[id number]]     Order Status:  No result Specimen:  Blood     Urine culture [[id number]]     Order Status:  No result Specimen:  Urine             Indication:   Pneumonia    Target Level: 15-20 mcg/ml    Hemodialysis:   No on N/A    Dosing Weight:   ABW--actual body weight  If ABW is greater than or equal to 30% over Ideal Body Weight, AdjBW will be used to calculate vancomycin dose.    Last Vancomycin dose: N/A   Date/Time given: N/A         Vancomycin level:  No " results for input(s): VANCOMYCIN-TROUGH in the last 72 hours.  No results for input(s): VANCOMYCIN, RANDOM in the last 72 hours.    Per Protocol Initial/Adjustments Dosin. Initial/Adjustment Dose: Initial vancomycin dose adjusted from 2000 mg X1 dose to 1000 mg X1 dose.   2. Vancomycin ...     Pharmacy will continue to follow.    Please contact if you have any further questions. Thank you.    Jerad Gastelum, PharmD  [phone]

## 2018-05-04 NOTE — CONSULTS
[facility]  Cardiology  Consult Note    Patient Name: Elena Patton  MRN: [id number]  Admission Date: 5/3/2018  Hospital Length of Stay: 0 days  Code Status: Full Code   Attending Provider: Riky Santos III, MD   Consulting Provider: Alvin Jamison NP  Primary Care Physician: Gael Chao MD  Principal Problem:Bilateral pulmonary embolism    Patient information was obtained from patient and ER records.     Inpatient consult to Cardiology  Consult performed by: ALVIN JAMISON  Consult ordered by: RAYSHAWN SOSA        Subjective:     Chief Complaint:  SOB      HPI:   Elena Patton is a 78 yo female with HFrEF 45%, chronic AF not on OAC 2/2 falls, hx of ESBL UTI's, and recent admission for PNA. Patient presented from [facility] with left sided pleuritic chest pain and SOB.   CTA revealed extensive bilateral pulmonary embolisms with evidence of right heart strain and pulmonary infarcts.   She is hypotensive and tachycardic. WBC ct elevated but afebrile. Patient placed on heparin gtt and feeling better this AM. Satting well on 2LNC. Bedside 2DE with evidence of moderate right heart strain (unofficial read). US of BLE pending. BNP elevated at 415. HH 11/36. No history of bleeding. Cr normal. No known allergies. She is admitted to [facility]. Cardiology and ID consulted.   NPO for catheter directed thrombolysis this morning.     Past Medical History:   Diagnosis Date    Anticoagulant long-term use     Atrial fibrillation     Coronary artery disease     Hypertension     Renal disorder     Thyroid disease        Past Surgical History:   Procedure Laterality Date    APPENDECTOMY      CARDIAC SURGERY      HYSTERECTOMY      JOINT REPLACEMENT         Review of patient's allergies indicates:  No Known Allergies    No current facility-administered medications on file prior to encounter.      Current Outpatient Prescriptions on File Prior to Encounter   Medication Sig    amiodarone  (PACERONE) 200 MG Tab Take by mouth once daily.    amitriptyline (ELAVIL) 10 MG tablet Take 10 mg by mouth 2 (two) times daily.    aspirin 325 MG tablet Take 1 tablet (325 mg total) by mouth once daily.    benzonatate (TESSALON) 100 MG capsule Take 100 mg by mouth 3 (three) times daily as needed for Cough.    cetirizine (ZYRTEC) 10 MG tablet Take 10 mg by mouth once daily.    diltiaZEM (CARDIZEM) 30 MG tablet Take 1 tablet (30 mg total) by mouth every 6 (six) hours.    DULoxetine (CYMBALTA) 30 MG capsule Take 30 mg by mouth once daily.    FERROUS SULFATE, BULK, MISC by Misc.(Non-Drug; Combo Route) route.    furosemide (LASIX) 40 MG tablet Take 1 tablet (40 mg total) by mouth once daily.    gabapentin (NEURONTIN) 600 MG tablet Take 600 mg by mouth 3 (three) times daily.    guaifenesin 100 mg/5 ml (ROBITUSSIN) 100 mg/5 mL syrup Take 10 mLs (200 mg total) by mouth every 4 (four) hours as needed for Cough or Congestion.    ipratropium (ATROVENT) 42 mcg (0.06 %) nasal spray 2 sprays by Nasal route 3 (three) times daily as needed for Rhinitis.    Lactobacillus acidoph-L.bulgar 1 million cell Chew Take 4 tablets by mouth 3 (three) times daily with meals.    levothyroxine (SYNTHROID) 25 MCG tablet Take 25 mcg by mouth once daily.    lubiprostone (AMITIZA) 24 MCG Cap Take 24 mcg by mouth 2 (two) times daily with meals.    multivitamin (THERAGRAN) tablet Take 1 tablet by mouth once daily.    nitrofurantoin, macrocrystal-monohydrate, (MACROBID) 100 MG capsule Take 1 capsule (100 mg total) by mouth every 12 (twelve) hours. End date 5/6/18    oxyCODONE-acetaminophen (PERCOCET) 5-325 mg per tablet Take 1 tablet by mouth every 6 (six) hours as needed for Pain.    promethazine (PHENERGAN) 25 MG tablet Take 25 mg by mouth every 6 (six) hours as needed for Nausea.     Family History     None        Social History Main Topics    Smoking status: Never Smoker    Smokeless tobacco: Never Used    Alcohol use No     Drug use: No    Sexual activity: Not on file     Review of Systems   Constitution: Positive for weakness. Negative for diaphoresis.   HENT: Negative.    Eyes: Negative.    Cardiovascular: Positive for dyspnea on exertion. Negative for chest pain, irregular heartbeat, leg swelling, near-syncope, orthopnea, palpitations, paroxysmal nocturnal dyspnea and syncope.   Respiratory: Positive for shortness of breath. Negative for cough, sputum production and wheezing.    Endocrine: Negative.    Hematologic/Lymphatic: Negative.  Does not bruise/bleed easily.   Skin: Negative.    Musculoskeletal: Positive for falls.   Gastrointestinal: Negative for bloating, abdominal pain, heartburn, hematemesis, hematochezia, melena, nausea and vomiting.   Genitourinary: Negative.    Psychiatric/Behavioral: Negative.    Allergic/Immunologic: Negative.      Objective:     Vital Signs (Most Recent):  Temp: 97.3 °F (36.3 °C) (05/04/18 0745)  Pulse: 104 (05/04/18 0743)  Resp: 18 (05/04/18 0743)  BP: 101/70 (05/04/18 0600)  SpO2: 98 % (05/04/18 0743) Vital Signs (24h Range):  Temp:  [97.3 °F (36.3 °C)-98.2 °F (36.8 °C)] 97.3 °F (36.3 °C)  Pulse:  [] 104  Resp:  [12-27] 18  SpO2:  [94 %-98 %] 98 %  BP: ()/(7-77) 101/70     Weight: 48.9 kg (107 lb 12.9 oz)  Body mass index is 19.1 kg/m².    SpO2: 98 %  O2 Device (Oxygen Therapy): nasal cannula      Intake/Output Summary (Last 24 hours) at 05/04/18 0851  Last data filed at 05/04/18 0604   Gross per 24 hour   Intake            712.6 ml   Output              290 ml   Net            422.6 ml       Lines/Drains/Airways     Drain                 Urethral Catheter 05/04/18 0403 14 Fr. less than 1 day          Peripheral Intravenous Line                 Peripheral IV - Single Lumen 05/03/18 2051 Left Forearm less than 1 day         Peripheral IV - Single Lumen 05/03/18 2052 Right Wrist less than 1 day                Physical Exam   Constitutional: She is oriented to person, place, and time.  She appears ill. No distress.   HENT:   Head: Normocephalic and atraumatic.   Eyes: Right eye exhibits no discharge. Left eye exhibits no discharge.   Neck: No JVD present.   Cardiovascular: An irregularly irregular rhythm present. Tachycardia present.  Exam reveals no gallop.    Murmur heard.  Pulmonary/Chest: Effort normal. Tachypnea noted. She has decreased breath sounds. She has no rales.   Abdominal: Soft. Bowel sounds are normal.   Musculoskeletal: She exhibits no edema.   Neurological: She is alert and oriented to person, place, and time.   Skin: Skin is warm and dry. She is not diaphoretic.   Psychiatric: She has a normal mood and affect. Her behavior is normal. Judgment and thought content normal.       Significant Labs:   Blood Culture: No results for input(s): LABBLOO in the last 48 hours., CMP   Recent Labs  Lab 05/03/18 2114 05/04/18  0401   * 129*   K 5.1 4.3   CL 90* 95   CO2 26 27   * 123*   BUN 23 18   CREATININE 0.8 0.8   CALCIUM 8.5* 8.0*   PROT 6.0 5.4*   ALBUMIN 2.3* 1.8*   BILITOT 0.8 0.7   ALKPHOS 148* 115   AST 69* 36   ALT 30 21   ANIONGAP 13 7*   ESTGFRAFRICA >60 >60   EGFRNONAA >60 >60   , CBC   Recent Labs  Lab 05/03/18 2114 05/04/18  0401   WBC 21.15* 16.37*   HGB 12.7 11.5*   HCT 40.6 36.4*    273   , INR   Recent Labs  Lab 05/04/18  0058   INR 1.1   , Lipid Panel No results for input(s): CHOL, HDL, LDLCALC, TRIG, CHOLHDL in the last 48 hours., Troponin   Recent Labs  Lab 05/03/18 2114   TROPONINI 0.010    and All pertinent lab results from the last 24 hours have been reviewed.    Significant Imaging: Echocardiogram:   2D echo with color flow doppler:   Results for orders placed or performed during the hospital encounter of 04/19/18   2D echo with color flow doppler   Result Value Ref Range    EF 45 55 - 65    Mitral Valve Regurgitation MILD TO MODERATE     Diastolic Dysfunction Yes (A)     Est. PA Systolic Pressure 27.4     Pericardial Effusion SMALL (A)      Mitral Valve Mobility NORMAL     Tricuspid Valve Regurgitation MILD      Assessment and Plan:     * Bilateral pulmonary embolism    CTA chest with extensive filling defects throughout the pulmonary tree involving the segmental vessels to the right upper, right lower, left upper and left lower lobes.  Bowing of the interventricular septum to the left with dilatation of the right-sided chambers noted. Evidence of LLL infarction noted.    She is tachycardic,  Hypotensive, and requiring supplemental O2.     Continue Heparin gtt    NPO for catheter directed thrombolysis this morning     2DE and US of BLE pending    Will require OAC prior to discharge         Chronic combined systolic and diastolic heart failure    LVEF 45-50% with significant DD, mild- mod MR, mild TR per recent 2DE  Repeat pending to evaluate the degree of right heart strain  Not on BB or ACEI at home for unknown reason and will initiate as her status improves  Holding home Lasix 40 mg QD  Euvolemic on exam         Essential hypertension    BP 80s-100s  Hold antihypertensives         Atrial fibrillation with RVR    RVR 2/2 extensive PEs  Continue home Amio and Cardizem  Not on OAC 2/2 falls, will require OAC initiation upon discharge for PE treatment - likely coumadin given reversibility             VTE Risk Mitigation         Ordered     heparin 25,000 units in dextrose 5% 250 mL (100 units/mL) infusion; FEMALE  Continuous      05/04/18 0334     heparin 25,000 units in dextrose 5% 250 mL (100 units/mL) bolus from bag; PRN BOLUS  As needed (PRN)      05/04/18 0334     heparin 25,000 units in dextrose 5% 250 mL (100 units/mL) bolus from bag; PRN BOLUS  As needed (PRN)      05/04/18 0334     IP VTE HIGH RISK PATIENT  Once      05/04/18 0322          Thank you for your consult. I will follow-up with patient. Please contact us if you have any additional questions.    Alvin Shah, REESE  Cardiology   [facility]

## 2018-05-04 NOTE — PLAN OF CARE
Pt received to ICU room 266 from ED on stretcher with cardiac monitoring, oxygen, chart, and IV pole. No pt belongings present. Pt states her son took them. Placed on ICU monitor. VSS. Remains on Heparin and started on fluids. MD notified of pt arrival. Safety precautions in place.

## 2018-05-04 NOTE — PROGRESS NOTES
Pharmacist Renal Dose Adjustment Note    Elena Patton is a 77 y.o. female being treated with the medication Zosyn    Patient Data:    Vital Signs (Most Recent):  Temp: 97.4 °F (36.3 °C) (05/04/18 0315)  Pulse: (!) 115 (05/04/18 0315)  Resp: 20 (05/04/18 0315)  BP: 106/77 (05/04/18 0315)  SpO2: 97 % (05/04/18 0315)   Vital Signs (72h Range):  Temp:  [97.4 °F (36.3 °C)-98.2 °F (36.8 °C)]   Pulse:  []   Resp:  [12-20]   BP: ()/(7-77)   SpO2:  [95 %-98 %]        Recent Labs     Lab 05/03/18 2114   CREATININE 0.8     Serum creatinine: 0.8 mg/dL 05/03/18 2114  Estimated creatinine clearance: 44.7 mL/min    Medication: Zosyn 4.5 gm IV q12h will be changed to Zosyn 4.5 gm IV q8h.    Pharmacist's Name: Jerad Gastelum  Pharmacist's Extension: [phone]

## 2018-05-04 NOTE — PLAN OF CARE
Problem: Nutrition, Imbalanced: Inadequate Oral Intake (Adult)  Goal: Improved Oral Intake  Patient will demonstrate the desired outcomes by discharge/transition of care.  Outcome: Ongoing (interventions implemented as appropriate)  Recommendation/Intervention:   1. Change diet to Cardiac.   2. Encourage good intake at meals.   3. Add Boost Plus bid.    Goals:   Pt will consume at least 50% intake at meals  Nutrition Goal Status: new  Communication of RD Recs: reviewed with RN (Lisa)

## 2018-05-04 NOTE — ASSESSMENT & PLAN NOTE
Stable- even hypotensive  Resuscitating with fluids, will consider holding BP meds if BP continues to be low

## 2018-05-04 NOTE — ASSESSMENT & PLAN NOTE
CTA chest with extensive filling defects throughout the pulmonary tree involving the segmental vessels to the right upper, right lower, left upper and left lower lobes.  Bowing of the interventricular septum to the left with dilatation of the right-sided chambers noted. Evidence of LLL infarction noted.    She is tachycardic,  Hypotensive, and requiring supplemental O2.     Continue Heparin gtt    NPO for catheter directed thrombolysis this morning     2DE and US of BLE pending    Will require OAC prior to discharge

## 2018-05-04 NOTE — PROGRESS NOTES
Progress Note  [facility]    Admit Date: 5/3/2018   LOS: 0 days     SUBJECTIVE:     Follow-up For:  Extensive bilateral PE    Patient was interviewed and examined today. She has no complaints. Currently getting breathing treatment. She is NPO. Rodriguez draining clear urine.       Scheduled Meds:   albuterol-ipratropium 2.5mg-0.5mg/3mL  3 mL Nebulization Q6H    amiodarone  200 mg Oral BID    amitriptyline  10 mg Oral BID    aspirin  325 mg Oral Daily    cetirizine  10 mg Oral Daily    DULoxetine  30 mg Oral Daily    levothyroxine  25 mcg Oral Before breakfast    lidocaine  1 patch Transdermal Q24H    lubiprostone  24 mcg Oral BID WM    pantoprazole 40 mg in dextrose 5 % 100 mL infusion (ready to mix system)  40 mg Intravenous Daily    piperacillin-tazobactam (ZOSYN) IVPB  4.5 g Intravenous Q8H    vancomycin (VANCOCIN) IVPB  15 mg/kg Intravenous Q24H     Continuous Infusions:   0.45 % NaCl with KCl 20 mEq 125 mL/hr at 05/04/18 0747    heparin (porcine) in D5W 18 Units/kg/hr (05/04/18 0458)     PRN Meds:acetaminophen, benzonatate, heparin (PORCINE), heparin (PORCINE), sodium chloride 0.9%    Review of patient's allergies indicates:  No Known Allergies    Review of Systems  Denies all     OBJECTIVE:     Vital Signs (Most Recent)  Temp: 97.3 °F (36.3 °C) (05/04/18 0745)  Pulse: 104 (05/04/18 0743)  Resp: 18 (05/04/18 0743)  BP: 101/70 (05/04/18 0600)  SpO2: 98 % (05/04/18 0743)    Vital Signs Range (Last 24H):  Temp:  [97.3 °F (36.3 °C)-98.2 °F (36.8 °C)]   Pulse:  []   Resp:  [12-27]   BP: ()/(7-77)   SpO2:  [94 %-98 %]     I & O (Last 24H):  Intake/Output Summary (Last 24 hours) at 05/04/18 0943  Last data filed at 05/04/18 0604   Gross per 24 hour   Intake            712.6 ml   Output              290 ml   Net            422.6 ml     Wt Readings from Last 3 Encounters:   05/04/18 48.9 kg (107 lb 12.9 oz)   04/26/18 48.8 kg (107 lb 9.4 oz)   12/11/17 49 kg (108 lb)     Physical  Exam:  Gen: NAD  HEENT: NC, AT  Chest: decreased breath sounds b/l, mild inspiratory wheeze and mild bibasilar rales b/l   CVS: RRR, no m/r/g  Abdomen: soft, NT, ND, no masses, +BS  Ext: no edema, pulses 2+   Skin: ro rashes  Neuro: A&O x 3, CN's grossly intact, sensation intact to light touch  Psych: Normal mood, affect, thought content      Laboratory:  LABS  CBC    Recent Labs  Lab 05/03/18 2114 05/04/18  0401   WBC 21.15* 16.37*   RBC 4.35 3.88*   HGB 12.7 11.5*   HCT 40.6 36.4*    273   MCV 93 94   MCH 29.2 29.6   MCHC 31.3* 31.6*     BMP    Recent Labs  Lab 05/03/18 2114 05/04/18  0401   * 129*   K 5.1 4.3   CO2 26 27   CL 90* 95   BUN 23 18   CREATININE 0.8 0.8   * 123*       POCT-Glucose  No results found for: POCTGLUCOSE      Recent Labs  Lab 05/03/18 2114 05/04/18  0401   CALCIUM 8.5* 8.0*   MG  --  1.8   PHOS  --  3.4     LFT    Recent Labs  Lab 05/03/18 2114 05/04/18  0401   PROT 6.0 5.4*   ALBUMIN 2.3* 1.8*   BILITOT 0.8 0.7   AST 69* 36   ALKPHOS 148* 115   ALT 30 21       COAGS    Recent Labs  Lab 05/04/18  0058 05/04/18  0401   INR 1.1  --    APTT 28.1 51.9*     CE    Recent Labs  Lab 05/03/18 2114   TROPONINI 0.010     BNP    Recent Labs  Lab 05/03/18 2114   *     UA    Recent Labs  Lab 05/03/18 2228   COLORU Yellow   SPECGRAV 1.010   PHUR 5.0   PROTEINUA Trace*   BACTERIA Occasional     LAST HbA1c  Lab Results   Component Value Date    HGBA1C 4.8 04/19/2018         Diagnostic Results:    IP CONSULT TO CARDIOLOGY    ASSESSMENT/PLAN:     Neuro/Psych:  · A&O x 3 (did not know year)   · Avoid sedating pain meds if possible  · Pt is on cymbalta and amytriptiline. Amytriptiline is less favorable in geriatric population. Will consider weaning off.     Cardiovascular: (Hypertension, Chronic atrial fibrillation w/ hx of RVR)   · Home meds include amiodarone and diltiazem.  Will continue them to help ensure rate control &, thus, rythym control  · ECHO 4/2018 w/ EF 45%, grade  3 diastolic dysfunction  · Right heart strain evident on CTA  · Resuscitated with fluids, monitor BP  · Pt with pacemaker     Pulmonary: (Bilat Pulm Embolisms, ?HAP)   · DuoNeb q 6 hours scheduled  · ABx: Vanc, zosyn     GI/NUT:   · NPO   · GI Proph -- Protonix 40 mg IV daily while NPO     Renal/Electrolytes:   ·  cc  · Rodriguez resumed due to hx of urinary retention     Infectious Disease: (HAP)   ·  Pt meets sepsis criteria with HR, WBC and source  ·  Concern for recurrent RLL PNA  · S/p 30 cc/kg of fluid  · Leukocytosis improving: WBC 21 -> 16.3  · Sputum culture pending   · Blood cultures, urine cultures pending  · Pt with hx of ESBL UTI     Hem/Coag: (Anemia, DVT Proph)   · Current Hgb =       · 11.5/36.4  · H&H stable  ·  On IV Heparin GGT for bilat pulm embolism with protocol for checking  PTT  ·  Cards on board; taking to Cath lab for thrombolysis  · DVT Proph - Heparin     Endocrine:   · Conservative glycemic control, goal < 180 mg/dl     Musculoskeletal/Skin:   · Skin without acute findings   · Repositioning per ICU protocol     Dispo: Cath lab this morning with Cardiology    5/4/2018 Tushar Cho MD  9:43 AM

## 2018-05-04 NOTE — PLAN OF CARE
Problem: Patient Care Overview  Goal: Plan of Care Review  Outcome: Ongoing (interventions implemented as appropriate)  Pt afebrile. VSS. Remains on Heparin and fluids. Bleeding precautions in effect. Urine output since 0330 was 250 cc. Complains of a headache and chest pain. Awake, alert, oriented. Safety precautions in place.     Problem: Breathing Pattern Ineffective (Adult)  Goal: Identify Related Risk Factors and Signs and Symptoms  Related risk factors and signs and symptoms are identified upon initiation of Human Response Clinical Practice Guideline (CPG)  Outcome: Ongoing (interventions implemented as appropriate)  Pt reports SOB on exertion. Currently on 3 L NC. Sats 95%. Will continue to monitor.     Problem: Urine Elimination Impaired (Adult)  Goal: Identify Related Risk Factors and Signs and Symptoms  Related risk factors and signs and symptoms are identified upon initiation of Human Response Clinical Practice Guideline (CPG)  Outcome: Ongoing (interventions implemented as appropriate)  Rodriguez catheter placed due to urinary retention.

## 2018-05-04 NOTE — PT/OT/SLP PROGRESS
Physical Therapy      Patient Name:  Elena Patton   MRN:  [id number]    Patient not seen today secondary to  (post cath bedrest). Will follow-up 5/5/2018.    Avril Velez, PT

## 2018-05-04 NOTE — H&P
[facility] Hospital Medicine  History & Physical    Patient Name: Elena Patton  MRN: [id number]  Admission Date: 5/3/2018  Attending Physician: Riky Santos III, MD   Primary Care Provider: Gael Chao MD         Patient information was obtained from patient and ER records.     Subjective:     Principal Problem:Bilateral pulmonary embolism    Chief Complaint:   Chief Complaint   Patient presents with    Shortness of Breath     To ER per [facility] with c/o SOB and chest, abd pain from [facility].        HPI: 78 yo female w/ pmhx of CAD w/ pacemaker , CHF, chronic Afib (hx of sometimes with RVR), hx of ESBL UTI's presents from [facility] with left sided pleuritic chest pain that is also reproducible with palpation.  She is on home O2 at 2 Liters at the nursing home already.  She had a recent hospitalization (4/19/18- 4/26/18) at [facility] for pneumonia & ESBL UTI.  She was discharged to the nursing home due to her weakness.     Pt had only recently moved to [place] from [place] where she was having recurrent falls.  Anticoagulation was discontinued by her PCP in [place].    In ED CTA revealed sifnificant bilat pulmonary embolism with evidence of right heart strain.  Cardiology was called and heparin drip was recommended as well as observation in the ICU.  Also Infectious Disease was consulted because of the hx of ESBL urinary tract infections.  Pt continued to have pleuritic chest pain and aching hip. Continued to sat well on nasal cannula. Pt received a total of 2 L bolus of IVF in the ED, as pt does meet Septic criteria w/ tachycardia, WBC and source.  She continues to be afebrile, however.    Past Medical History:   Diagnosis Date    Anticoagulant long-term use     Atrial fibrillation     Coronary artery disease     Hypertension     Renal disorder     Thyroid disease      Past Surgical History:   Procedure Laterality Date    APPENDECTOMY       CARDIAC SURGERY      HYSTERECTOMY      JOINT REPLACEMENT       Review of patient's allergies indicates:  No Known Allergies    Current Outpatient Prescriptions on File Prior to Encounter   Medication Sig    amiodarone (PACERONE) 200 MG Tab Take by mouth once daily.    amitriptyline (ELAVIL) 10 MG tablet Take 10 mg by mouth 2 (two) times daily.    aspirin 325 MG tablet Take 1 tablet (325 mg total) by mouth once daily.    benzonatate (TESSALON) 100 MG capsule Take 100 mg by mouth 3 (three) times daily as needed for Cough.    cetirizine (ZYRTEC) 10 MG tablet Take 10 mg by mouth once daily.    diltiaZEM (CARDIZEM) 30 MG tablet Take 1 tablet (30 mg total) by mouth every 6 (six) hours.    DULoxetine (CYMBALTA) 30 MG capsule Take 30 mg by mouth once daily.    FERROUS SULFATE, BULK, MISC by Misc.(Non-Drug; Combo Route) route.    furosemide (LASIX) 40 MG tablet Take 1 tablet (40 mg total) by mouth once daily.    gabapentin (NEURONTIN) 600 MG tablet Take 600 mg by mouth 3 (three) times daily.    guaifenesin 100 mg/5 ml (ROBITUSSIN) 100 mg/5 mL syrup Take 10 mLs (200 mg total) by mouth every 4 (four) hours as needed for Cough or Congestion.    ipratropium (ATROVENT) 42 mcg (0.06 %) nasal spray 2 sprays by Nasal route 3 (three) times daily as needed for Rhinitis.    Lactobacillus acidoph-L.bulgar 1 million cell Chew Take 4 tablets by mouth 3 (three) times daily with meals.    levothyroxine (SYNTHROID) 25 MCG tablet Take 25 mcg by mouth once daily.    lubiprostone (AMITIZA) 24 MCG Cap Take 24 mcg by mouth 2 (two) times daily with meals.    multivitamin (THERAGRAN) tablet Take 1 tablet by mouth once daily.    nitrofurantoin, macrocrystal-monohydrate, (MACROBID) 100 MG capsule Take 1 capsule (100 mg total) by mouth every 12 (twelve) hours. End date 5/6/18    oxyCODONE-acetaminophen (PERCOCET) 5-325 mg per tablet Take 1 tablet by mouth every 6 (six) hours as needed for Pain.    promethazine (PHENERGAN) 25 MG  tablet Take 25 mg by mouth every 6 (six) hours as needed for Nausea.     Family History     Non-contrib        Social History Main Topics    Smoking status: Never Smoker    Smokeless tobacco: Never Used    Alcohol use No    Drug use: No    Sexual activity: Not on file     Review of Systems   Constitutional: Negative for diaphoresis and fever.   Respiratory: Positive for chest tightness and shortness of breath. Negative for wheezing.    Cardiovascular: Positive for chest pain.   Gastrointestinal: Negative for abdominal pain, constipation, diarrhea, nausea and vomiting.   Genitourinary: Negative for dysuria.   Musculoskeletal: Positive for arthralgias and gait problem.   Neurological: Negative for seizures and headaches.   Psychiatric/Behavioral: Positive for sleep disturbance.   All other systems reviewed and are negative.       Objective:     Vital Signs (Most Recent):  Temp: 98.2 °F (36.8 °C) (05/03/18 2048)  Pulse: 103 (05/04/18 0110)  Resp: 18 (05/04/18 0012)  BP: 105/69 (05/04/18 0110)  SpO2: 96 % (05/04/18 0110) Vital Signs (24h Range):  Temp:  [98.2 °F (36.8 °C)] 98.2 °F (36.8 °C)  Pulse:  [101-120] 103  Resp:  [12-20] 18  SpO2:  [95 %-98 %] 96 %  BP: (100-126)/(7-71) 105/69     Weight: 49.9 kg (110 lb)  Body mass index is 19.49 kg/m².    Physical Exam   Constitutional: She is oriented to person, place, and time. No distress.   Appears older than stated age, frail, cachectic   HENT:   Head: Normocephalic and atraumatic.   Mouth/Throat: No oropharyngeal exudate.   Eyes: EOM are normal. Pupils are equal, round, and reactive to light.   Neck: Normal range of motion. Neck supple. No JVD present.   Cardiovascular: Regular rhythm and normal heart sounds.    No murmur heard.  Pulmonary/Chest: Effort normal. No respiratory distress. She has wheezes. She has rales.   Abdominal: Soft. Bowel sounds are normal. She exhibits no distension and no mass. There is no tenderness.   Musculoskeletal: Normal range of motion.  She exhibits no edema.   Strength 5/5 throughout   Neurological: She is alert and oriented to person, place, and time. No cranial nerve deficit.   Skin: Skin is warm and dry. Capillary refill takes less than 2 seconds. She is not diaphoretic.   Psychiatric:   Agitated, anxious   Nursing note and vitals reviewed.        CRANIAL NERVES     CN III, IV, VI   Pupils are equal, round, and reactive to light.  Extraocular motions are normal.     Significant Labs:   CBC:   Recent Labs  Lab 05/03/18 2114   WBC 21.15*   HGB 12.7   HCT 40.6        CMP:   Recent Labs  Lab 05/03/18 2114   *   K 5.1   CL 90*   CO2 26   *   BUN 23   CREATININE 0.8   CALCIUM 8.5*   PROT 6.0   ALBUMIN 2.3*   BILITOT 0.8   ALKPHOS 148*   AST 69*   ALT 30   ANIONGAP 13   EGFRNONAA >60     Troponin:   Recent Labs  Lab 05/03/18 2114   TROPONINI 0.010     Urine Studies:   Recent Labs  Lab 05/03/18 2228   COLORU Yellow   APPEARANCEUA Clear   PHUR 5.0   SPECGRAV 1.010   PROTEINUA Trace*   GLUCUA Negative   KETONESU Negative   BILIRUBINUA 1+*   OCCULTUA Negative   NITRITE Negative   UROBILINOGEN Negative   LEUKOCYTESUR 1+*   WBCUA 50*   BACTERIA Occasional       Significant Imaging: I have reviewed all pertinent imaging results/findings within the past 24 hours.     CTA:   Extensive bilateral pulmonary embolism with evidence of right heart strain.    Large right-sided pleural effusion with associated compressive atelectasis.    Air bronchograms within the right lower lobe may represent some component of infectious pneumonia.    Peripheral airspace opacity in the left lower lobe may represent areas of pulmonary infarction.    Given the degree of atelectasis suggest outpatient PET-CT scan after the patient's symptoms resolve to look for primary pulmonary tumor.    Case discussed with Dr. Moseley at 05/04/2018 on 12:05 a.m.    Assessment/Plan:     Neuro/Psych:  · A&O x 3 (did not know year)   · Avoid sedating pain meds if possible  · Pt is  on cymbalta and amytriptiline. Amytriptiline is less favorable in feriatric population. Will consider weaning off.    Cardiovascular: (Hypertension, Chronic atrial fibrillation w/ hx of RVR)   · Home meds include amiodarone and diltiazem.  Will continue them to help ensure rate control &, thus, rythym control  · ECHO 4/2018 w/ EF 45%, grade 3 diastolic dysfunction  · Right heart strain evident on CTA  · Resuscitated with fluids, monitor BP  · Pt with pacemaker    Pulmonary: (Bilat Pulm Embolisms, ?HAP)   · DuoNeb q 6 hours scheduled  · ABx: Vanc, zosyn    GI/NUT:   · NPO at present   · GI Proph -- Protonix 40 mg IV daily while NPO    Renal/Electrolytes:   ·  cc  · Rodriguez resumed due to hx of urinary retention    Infectious Disease: (HAP)   ·  Pt meets sepsis criteria with HR, WBC and source  ·  Concern for recurrent RLL PNA  · Gave 30 cc/kg of fluid  · Leukocytosis: WBC 21  · Sputum culture pending   · Blood cultures, urine cultures pending  · Pt with hx of ESBL UTI    Hem/Coag: (Anemia, DVT Proph)   · Current Hgb =       · 12.7/40.6  · H&H stable  ·  On IV Heparin GGT for bilat pulm embolism with protocol for checking  PTT  ·  Cards on board  · DVT Proph - Heparin    Endocrine:   · Conservative glycemic control, goal < 180 mg/dl    Musculoskeletal/Skin:   · Skin without acute findings   · Repositioning per ICU protocol     Trauma:   · None reported       VTE Risk Mitigation         Ordered     heparin 25,000 units in dextrose 5% 250 mL (100 units/mL) infusion; FEMALE  Continuous      05/04/18 0224     heparin 25,000 units in dextrose 5% 250 mL (100 units/mL) bolus from bag; PRN BOLUS  As needed (PRN)      05/04/18 0007     heparin 25,000 units in dextrose 5% 250 mL (100 units/mL) bolus from bag; PRN BOLUS  As needed (PRN)      05/04/18 0007        Critical care time spent on the evaluation and treatment of severe organ dysfunction, review of pertinent labs and imaging studies, discussions with consulting  providers and discussions with patient/family: 30 minutes.     Titi Hirsch MD  Department of Hospital Medicine   [facility]

## 2018-05-04 NOTE — ED TRIAGE NOTES
Patient brought in by EMS from [facility]. Patient states she has been having chest pain since 3 pm today. Was given aspirin and pain medicine around 6pm but cannot recall which medicine it was. Symptoms were unrelieved. Patient states she has bilateral chest pain when inhaling. Was recently admitted for pneumonia and now doing rehab for her weakness. Son is at bedside. States she was walking really good today. Patient has pacemaker. History of Afib with RVR.  Patient states she was put on oxygen yesterday at [facility]. Is 100% on 2 L currently . Was given Cardizem in route. Patient is AAOx4     Review of patient's allergies indicates:  No Known Allergies     Patient has verified the spelling of their name and  on armband.   APPEARANCE: Patient is alert, calm, oriented x 4, and does not appear distressed.  SKIN: Skin is normal for race, warm, and dry. Normal skin turgor and mucous membranes moist.  HEENT: no teeth. [facility], dry mucous membranes, lips dry  CARDIAC: Normal rate and rhythm, no murmur heard. Bilateral chest pain with deep breaths +left sided pacemaker +monitor shows SV tach  RESPIRATORY:Normal rate and effort. Breath sounds clear bilaterally throughout chest. Respirations are equal and unlabored.  SOB. On 2 L NC at 100%  GASTRO: Bowel sounds normal, abdomen is soft, no tenderness, and no abdominal distention. Wearing a depend for accidents but is not incontinent. No BM today  MUSCLE: Full ROM. No bony tenderness or soft tissue tenderness. No obvious deformity. +left thumb amputation below distal knuckle  PERIPHERAL VASCULAR: peripheral pulses present. Normal cap refill. No edema. Warm to touch.  NEURO: 5/5 strength major flexors/extensors bilaterally. Sensory intact to light touch bilaterally. Sebastian coma scale: eyes open spontaneously-4, oriented & converses-5, obeys commands-6. No neurological abnormalities.   MENTAL STATUS: awake, alert and  aware of environment.  EYE: PERRL, both eyes: pupils brisk and reactive to light. Normal size.  ENT: EARS: no obvious drainage. NOSE: no active bleeding. THROAT: no redness or swelling.  BREAST: symmetrical. No masses. No tenderness.  GENITALIA: Normal external genitalia.

## 2018-05-04 NOTE — PLAN OF CARE
Noted heart rate going up to 124-128 at fib , not sustained, at times, no co from pt, of chest pain or sob, bp  sys,  Pt has been sleeping, notified  np Alvin of heart rate and bp, orders noted

## 2018-05-04 NOTE — PROGRESS NOTES
"Vancomycin Dosing and Monitoring Pharmacy Protocol    Elena Patton is a 77 y.o. female    Height: 5' 3" (1.6 m)   Wt Readings from Last 1 Encounters:   05/04/18 48.9 kg (107 lb 12.9 oz)     Ideal body weight: 52.4 kg (115 lb 8.3 oz)    Temp Readings from Last 3 Encounters:   05/04/18 97.4 °F (36.3 °C) (Axillary)   04/26/18 96.5 °F (35.8 °C) (Oral)   12/11/17 97.3 °F (36.3 °C) (Oral)      Lab Results   Component Value Date/Time    WBC 21.15 (H) 05/03/2018 09:14 PM    WBC 10.94 04/26/2018 06:07 AM    WBC 12.33 04/25/2018 04:59 AM      Lab Results   Component Value Date/Time    CREATININE 0.8 05/03/2018 09:14 PM    CREATININE 1.03 04/26/2018 06:07 AM    CREATININE 1.23 04/25/2018 02:46 PM        Serum creatinine: 0.8 mg/dL 05/03/18 2114  Estimated creatinine clearance: 44.7 mL/min    Antibiotics       Start     Stop Route Frequency Ordered    05/04/18 2345  vancomycin 750 mg in dextrose 5 % 250 mL IVPB (ready to mix system)  (Vancomycin IVPB with levels panel)      -- IV Every 24 hours (non-standard times) 05/04/18 0342    05/04/18 0700  piperacillin-tazobactam 4.5 g in dextrose 5 % 100 mL IVPB (ready to mix system)      -- IV Every 8 hours (non-standard times) 05/04/18 0338          Antifungals       None            Microbiology Results (last 7 days)       Procedure Component Value Units Date/Time    Blood culture x two cultures. Draw prior to antibiotics. [[id number]] Collected:  05/03/18 2250    Order Status:  Sent Specimen:  Blood from Peripheral, Antecubital, Right Updated:  05/04/18 0338    Blood culture x two cultures. Draw prior to antibiotics. [[id number]] Collected:  05/03/18 2240    Order Status:  Sent Specimen:  Blood from Peripheral, Forearm, Right Updated:  05/04/18 0338    Urine culture [[id number]] Collected:  05/03/18 2227    Order Status:  Sent Specimen:  Urine from Urine, Catheterized Updated:  05/04/18 0101            Indication:   Pneumonia/ infection     Target Level: 15-20 mcg/ml    Hemodialysis: "   No on N/A    Dosing Weight:   ABW--actual body weight  If ABW is greater than or equal to 30% over Ideal Body Weight, AdjBW will be used to calculate vancomycin dose.    Last Vancomycin dose: 1000 mg   Date/Time given: 5/3/18 at 2349          Vancomycin level:  No results for input(s): VANCOMYCIN-TROUGH in the last 72 hours.  No results for input(s): VANCOMYCIN, RANDOM in the last 72 hours.    Per Protocol Initial/Adjustments Dosin. Initial/Adjustment Dose: DECREASE Vancomycin will be adjusted from 750mg q12hr to 750mg q24hr  2. Vancomycin Trough Level will be drawn on 18 at 2315 date/time    Pharmacy will continue to follow.    Please contact if you have any further questions. Thank you.    Jerad Gastelum, PharmD  [phone]

## 2018-05-04 NOTE — PROGRESS NOTES
S/p placement EKOS for submassive/massive PE      PASP 50 mmHg      Plan:       EKOS catheter with 1 mg/hr TPA per catheter   Systemic heparin        DC TPA after hours   At 1700 pm         Continue with heparin      Thanks         ZN

## 2018-05-04 NOTE — PT/OT/SLP PROGRESS
Speech Language Pathology  Missed Treatment      Elena Patton  MRN: [id number]    Patient not seen today secondary to pt having Ultrasound.  Will follow-up next date.         LUCY Walter, CCC-SLP  5/4/2018

## 2018-05-04 NOTE — PT/OT/SLP PROGRESS
Occupational Therapy      Patient Name:  Elena Patton   MRN:  [id number]    Patient not seen today secondary to  (pt in cath lab). Will follow-up as able    Julieta Gibbs OT  5/4/2018

## 2018-05-04 NOTE — SUBJECTIVE & OBJECTIVE
Past Medical History:   Diagnosis Date    Anticoagulant long-term use     Atrial fibrillation     Coronary artery disease     Hypertension     Renal disorder     Thyroid disease        Past Surgical History:   Procedure Laterality Date    APPENDECTOMY      CARDIAC SURGERY      HYSTERECTOMY      JOINT REPLACEMENT         Review of patient's allergies indicates:  No Known Allergies    No current facility-administered medications on file prior to encounter.      Current Outpatient Prescriptions on File Prior to Encounter   Medication Sig    amiodarone (PACERONE) 200 MG Tab Take by mouth once daily.    amitriptyline (ELAVIL) 10 MG tablet Take 10 mg by mouth 2 (two) times daily.    aspirin 325 MG tablet Take 1 tablet (325 mg total) by mouth once daily.    benzonatate (TESSALON) 100 MG capsule Take 100 mg by mouth 3 (three) times daily as needed for Cough.    cetirizine (ZYRTEC) 10 MG tablet Take 10 mg by mouth once daily.    diltiaZEM (CARDIZEM) 30 MG tablet Take 1 tablet (30 mg total) by mouth every 6 (six) hours.    DULoxetine (CYMBALTA) 30 MG capsule Take 30 mg by mouth once daily.    FERROUS SULFATE, BULK, MISC by Misc.(Non-Drug; Combo Route) route.    furosemide (LASIX) 40 MG tablet Take 1 tablet (40 mg total) by mouth once daily.    gabapentin (NEURONTIN) 600 MG tablet Take 600 mg by mouth 3 (three) times daily.    guaifenesin 100 mg/5 ml (ROBITUSSIN) 100 mg/5 mL syrup Take 10 mLs (200 mg total) by mouth every 4 (four) hours as needed for Cough or Congestion.    ipratropium (ATROVENT) 42 mcg (0.06 %) nasal spray 2 sprays by Nasal route 3 (three) times daily as needed for Rhinitis.    Lactobacillus acidoph-L.bulgar 1 million cell Chew Take 4 tablets by mouth 3 (three) times daily with meals.    levothyroxine (SYNTHROID) 25 MCG tablet Take 25 mcg by mouth once daily.    lubiprostone (AMITIZA) 24 MCG Cap Take 24 mcg by mouth 2 (two) times daily with meals.    multivitamin (THERAGRAN) tablet  Take 1 tablet by mouth once daily.    nitrofurantoin, macrocrystal-monohydrate, (MACROBID) 100 MG capsule Take 1 capsule (100 mg total) by mouth every 12 (twelve) hours. End date 5/6/18    oxyCODONE-acetaminophen (PERCOCET) 5-325 mg per tablet Take 1 tablet by mouth every 6 (six) hours as needed for Pain.    promethazine (PHENERGAN) 25 MG tablet Take 25 mg by mouth every 6 (six) hours as needed for Nausea.     Family History     None        Social History Main Topics    Smoking status: Never Smoker    Smokeless tobacco: Never Used    Alcohol use No    Drug use: No    Sexual activity: Not on file     Review of Systems   Constitution: Positive for weakness. Negative for diaphoresis.   HENT: Negative.    Eyes: Negative.    Cardiovascular: Positive for dyspnea on exertion. Negative for chest pain, irregular heartbeat, leg swelling, near-syncope, orthopnea, palpitations, paroxysmal nocturnal dyspnea and syncope.   Respiratory: Positive for shortness of breath. Negative for cough, sputum production and wheezing.    Endocrine: Negative.    Hematologic/Lymphatic: Negative.  Does not bruise/bleed easily.   Skin: Negative.    Musculoskeletal: Positive for falls.   Gastrointestinal: Negative for bloating, abdominal pain, heartburn, hematemesis, hematochezia, melena, nausea and vomiting.   Genitourinary: Negative.    Psychiatric/Behavioral: Negative.    Allergic/Immunologic: Negative.      Objective:     Vital Signs (Most Recent):  Temp: 97.3 °F (36.3 °C) (05/04/18 0745)  Pulse: 104 (05/04/18 0743)  Resp: 18 (05/04/18 0743)  BP: 101/70 (05/04/18 0600)  SpO2: 98 % (05/04/18 0743) Vital Signs (24h Range):  Temp:  [97.3 °F (36.3 °C)-98.2 °F (36.8 °C)] 97.3 °F (36.3 °C)  Pulse:  [] 104  Resp:  [12-27] 18  SpO2:  [94 %-98 %] 98 %  BP: ()/(7-77) 101/70     Weight: 48.9 kg (107 lb 12.9 oz)  Body mass index is 19.1 kg/m².    SpO2: 98 %  O2 Device (Oxygen Therapy): nasal cannula      Intake/Output Summary (Last 24  hours) at 05/04/18 0851  Last data filed at 05/04/18 0604   Gross per 24 hour   Intake            712.6 ml   Output              290 ml   Net            422.6 ml       Lines/Drains/Airways     Drain                 Urethral Catheter 05/04/18 0403 14 Fr. less than 1 day          Peripheral Intravenous Line                 Peripheral IV - Single Lumen 05/03/18 2051 Left Forearm less than 1 day         Peripheral IV - Single Lumen 05/03/18 2052 Right Wrist less than 1 day                Physical Exam   Constitutional: She is oriented to person, place, and time. She appears ill. No distress.   HENT:   Head: Normocephalic and atraumatic.   Eyes: Right eye exhibits no discharge. Left eye exhibits no discharge.   Neck: No JVD present.   Cardiovascular: An irregularly irregular rhythm present. Tachycardia present.  Exam reveals no gallop.    Murmur heard.  Pulmonary/Chest: Effort normal. Tachypnea noted. She has decreased breath sounds. She has no rales.   Abdominal: Soft. Bowel sounds are normal.   Musculoskeletal: She exhibits no edema.   Neurological: She is alert and oriented to person, place, and time.   Skin: Skin is warm and dry. She is not diaphoretic.   Psychiatric: She has a normal mood and affect. Her behavior is normal. Judgment and thought content normal.       Significant Labs:   Blood Culture: No results for input(s): LABBLOO in the last 48 hours., CMP   Recent Labs  Lab 05/03/18 2114 05/04/18 0401   * 129*   K 5.1 4.3   CL 90* 95   CO2 26 27   * 123*   BUN 23 18   CREATININE 0.8 0.8   CALCIUM 8.5* 8.0*   PROT 6.0 5.4*   ALBUMIN 2.3* 1.8*   BILITOT 0.8 0.7   ALKPHOS 148* 115   AST 69* 36   ALT 30 21   ANIONGAP 13 7*   ESTGFRAFRICA >60 >60   EGFRNONAA >60 >60   , CBC   Recent Labs  Lab 05/03/18 2114 05/04/18 0401   WBC 21.15* 16.37*   HGB 12.7 11.5*   HCT 40.6 36.4*    273   , INR   Recent Labs  Lab 05/04/18  0058   INR 1.1   , Lipid Panel No results for input(s): CHOL, HDL, LDLCALC,  TRIG, CHOLHDL in the last 48 hours., Troponin   Recent Labs  Lab 05/03/18  2114   TROPONINI 0.010    and All pertinent lab results from the last 24 hours have been reviewed.    Significant Imaging: Echocardiogram:   2D echo with color flow doppler:   Results for orders placed or performed during the hospital encounter of 04/19/18   2D echo with color flow doppler   Result Value Ref Range    EF 45 55 - 65    Mitral Valve Regurgitation MILD TO MODERATE     Diastolic Dysfunction Yes (A)     Est. PA Systolic Pressure 27.4     Pericardial Effusion SMALL (A)     Mitral Valve Mobility NORMAL     Tricuspid Valve Regurgitation MILD

## 2018-05-04 NOTE — HPI
78 yo female w/ pmhx of CAD w/ pacemaker , CHF, chronic Afib (hx of sometimes with RVR), hx of ESBL UTI's presents from [facility] with left sided pleuritic chest pain that is also reproducible with palpation.  She is on home O2 at 2 Liters at the nursing home already.  She had a recent hospitalization (4/19/18- 4/26/18) at [facility] for pneumonia & ESBL UTI.  She was discharged to the nursing home due to her weakness.     Pt had only recently moved to [place] from [place] where she was having recurrent falls.  Anticoagulation was discontinued by her PCP in [place].    In ED CTA revealed sifnificant bilat pulmonary embolism with evidence of right heart strain.  Cardiology was called and heparin drip was recommended as well as observation in the ICU.  Also Infectious Disease was consulted because of the hx of ESBL urinary tract infections.  Pt continued to have pleuritic chest pain and aching hip. Continued to sat well on nasal cannula. Pt received a total of 2 L bolus of IVF in the ED, as pt does meet Septic criteria w/ tachycardia, WBC and source.  She continues to be afebrile, however.

## 2018-05-04 NOTE — ASSESSMENT & PLAN NOTE
LVEF 45-50% with significant DD, mild- mod MR, mild TR per recent 2DE  Repeat pending to evaluate the degree of right heart strain  Not on BB or ACEI at home for unknown reason and will initiate as her status improves  Holding home Lasix 40 mg QD  Euvolemic on exam

## 2018-05-04 NOTE — ASSESSMENT & PLAN NOTE
Recently hospitalized for treatment of PNA, now treating for HAP  WBC 21, Xray concerning for infectious as well as CT indications for pulm infarct and hemorrhage  Treating with broad spectrum Vanc, Zosyn

## 2018-05-04 NOTE — ASSESSMENT & PLAN NOTE
Pt tachy to 113-120's, WBC 21.5, with source of PNA vs UTI  According to Early Goal Directed Therapy, Given fluids 30 cc/kg  Trending lactic acids, labs  Blood cultures, urine cultures drawn before abx  On broad spectrum abx

## 2018-05-04 NOTE — PLAN OF CARE
TN went to meet with patient, off floor at this time.   TN reviewed patient's clinicals. She is from [facility].  TN faxed updated clinicals to them. I also called patient's son listed on facesheet to verify patient information, no response. TN left message for call back.    Future Appointments  Date Time Provider Department Center   5/14/2018 10:40 AM Zach Corona MD PhD [facility] CARDIO [place]   5/22/2018 2:00 PM Sriram Han MD [facility] URO [place]        05/04/18 1208   Discharge Assessment   Assessment Type Discharge Planning Assessment   Assessment information obtained from? Medical Record   Prior to hospitalization functional status: Needs Assistance;Assistive Equipment   Current cognitive status: Unable to Assess   Current Functional Status: Needs Assistance   Lives With facility resident   Able to Return to Prior Arrangements unable to determine at this time (comments)   Patient's perception of discharge disposition skilled nursing facility   Readmission Within The Last 30 Days current reason for admission unrelated to previous admission   If yes, most recent facility name: [facility]   Does the patient have transportation home? Yes   Transportation Available van, wheelchair accessible;agency transportation   Discharge Plan A Skilled Nursing Facility   Discharge Plan B Other  (to be determined)   Patient/Family In Agreement With Plan unable to assess   Readmission Questionnaire   At the time of your discharge, did someone talk to you about what your health problems were? (Unable to assess)   At the time of discharge, did someone talk to you about what to watch out for regarding worsening of your health problem? (Unable to assess)   What do you believe caused you to be sick enough to be re-admitted? Per notes, patient was having SOB and chest pain.   Does the patient have transportation to healthcare appointments? Yes   Living Arrangements other (see comments)  ([facility]  SNF)   Are you currently feeling confused? (Unable to assess)     Fabiola Dowling RN  Transition Navigator  [phone]

## 2018-05-04 NOTE — SUBJECTIVE & OBJECTIVE
Past Medical History:   Diagnosis Date    Anticoagulant long-term use     Atrial fibrillation     Coronary artery disease     Hypertension     Renal disorder     Thyroid disease      Past Surgical History:   Procedure Laterality Date    APPENDECTOMY      CARDIAC SURGERY      HYSTERECTOMY      JOINT REPLACEMENT       Review of patient's allergies indicates:  No Known Allergies    Current Outpatient Prescriptions on File Prior to Encounter   Medication Sig    amiodarone (PACERONE) 200 MG Tab Take by mouth once daily.    amitriptyline (ELAVIL) 10 MG tablet Take 10 mg by mouth 2 (two) times daily.    aspirin 325 MG tablet Take 1 tablet (325 mg total) by mouth once daily.    benzonatate (TESSALON) 100 MG capsule Take 100 mg by mouth 3 (three) times daily as needed for Cough.    cetirizine (ZYRTEC) 10 MG tablet Take 10 mg by mouth once daily.    diltiaZEM (CARDIZEM) 30 MG tablet Take 1 tablet (30 mg total) by mouth every 6 (six) hours.    DULoxetine (CYMBALTA) 30 MG capsule Take 30 mg by mouth once daily.    FERROUS SULFATE, BULK, MISC by Misc.(Non-Drug; Combo Route) route.    furosemide (LASIX) 40 MG tablet Take 1 tablet (40 mg total) by mouth once daily.    gabapentin (NEURONTIN) 600 MG tablet Take 600 mg by mouth 3 (three) times daily.    guaifenesin 100 mg/5 ml (ROBITUSSIN) 100 mg/5 mL syrup Take 10 mLs (200 mg total) by mouth every 4 (four) hours as needed for Cough or Congestion.    ipratropium (ATROVENT) 42 mcg (0.06 %) nasal spray 2 sprays by Nasal route 3 (three) times daily as needed for Rhinitis.    Lactobacillus acidoph-L.bulgar 1 million cell Chew Take 4 tablets by mouth 3 (three) times daily with meals.    levothyroxine (SYNTHROID) 25 MCG tablet Take 25 mcg by mouth once daily.    lubiprostone (AMITIZA) 24 MCG Cap Take 24 mcg by mouth 2 (two) times daily with meals.    multivitamin (THERAGRAN) tablet Take 1 tablet by mouth once daily.    nitrofurantoin, macrocrystal-monohydrate,  (MACROBID) 100 MG capsule Take 1 capsule (100 mg total) by mouth every 12 (twelve) hours. End date 5/6/18    oxyCODONE-acetaminophen (PERCOCET) 5-325 mg per tablet Take 1 tablet by mouth every 6 (six) hours as needed for Pain.    promethazine (PHENERGAN) 25 MG tablet Take 25 mg by mouth every 6 (six) hours as needed for Nausea.     Family History     Non-contrib        Social History Main Topics    Smoking status: Never Smoker    Smokeless tobacco: Never Used    Alcohol use No    Drug use: No    Sexual activity: Not on file     Review of Systems   Constitutional: Negative for diaphoresis and fever.   Respiratory: Positive for chest tightness and shortness of breath. Negative for wheezing.    Cardiovascular: Positive for chest pain.   Gastrointestinal: Negative for abdominal pain, constipation, diarrhea, nausea and vomiting.   Genitourinary: Negative for dysuria.   Musculoskeletal: Positive for arthralgias and gait problem.   Neurological: Negative for seizures and headaches.   Psychiatric/Behavioral: Positive for sleep disturbance.   All other systems reviewed and are negative.       Objective:     Vital Signs (Most Recent):  Temp: 98.2 °F (36.8 °C) (05/03/18 2048)  Pulse: 103 (05/04/18 0110)  Resp: 18 (05/04/18 0012)  BP: 105/69 (05/04/18 0110)  SpO2: 96 % (05/04/18 0110) Vital Signs (24h Range):  Temp:  [98.2 °F (36.8 °C)] 98.2 °F (36.8 °C)  Pulse:  [101-120] 103  Resp:  [12-20] 18  SpO2:  [95 %-98 %] 96 %  BP: (100-126)/(7-71) 105/69     Weight: 49.9 kg (110 lb)  Body mass index is 19.49 kg/m².    Physical Exam   Constitutional: She is oriented to person, place, and time. No distress.   Appears older than stated age, frail, cachectic   HENT:   Head: Normocephalic and atraumatic.   Mouth/Throat: No oropharyngeal exudate.   Eyes: EOM are normal. Pupils are equal, round, and reactive to light.   Neck: Normal range of motion. Neck supple. No JVD present.   Cardiovascular: Regular rhythm and normal heart  sounds.    No murmur heard.  Pulmonary/Chest: Effort normal. No respiratory distress. She has wheezes. She has rales.   Abdominal: Soft. Bowel sounds are normal. She exhibits no distension and no mass. There is no tenderness.   Musculoskeletal: Normal range of motion. She exhibits no edema.   Strength 5/5 throughout   Neurological: She is alert and oriented to person, place, and time. No cranial nerve deficit.   Skin: Skin is warm and dry. Capillary refill takes less than 2 seconds. She is not diaphoretic.   Psychiatric:   Agitated, anxious   Nursing note and vitals reviewed.        CRANIAL NERVES     CN III, IV, VI   Pupils are equal, round, and reactive to light.  Extraocular motions are normal.     Significant Labs:   CBC:   Recent Labs  Lab 05/03/18 2114   WBC 21.15*   HGB 12.7   HCT 40.6        CMP:   Recent Labs  Lab 05/03/18 2114   *   K 5.1   CL 90*   CO2 26   *   BUN 23   CREATININE 0.8   CALCIUM 8.5*   PROT 6.0   ALBUMIN 2.3*   BILITOT 0.8   ALKPHOS 148*   AST 69*   ALT 30   ANIONGAP 13   EGFRNONAA >60     Troponin:   Recent Labs  Lab 05/03/18 2114   TROPONINI 0.010     Urine Studies:   Recent Labs  Lab 05/03/18 2228   COLORU Yellow   APPEARANCEUA Clear   PHUR 5.0   SPECGRAV 1.010   PROTEINUA Trace*   GLUCUA Negative   KETONESU Negative   BILIRUBINUA 1+*   OCCULTUA Negative   NITRITE Negative   UROBILINOGEN Negative   LEUKOCYTESUR 1+*   WBCUA 50*   BACTERIA Occasional       Significant Imaging: I have reviewed all pertinent imaging results/findings within the past 24 hours.     CTA:   Extensive bilateral pulmonary embolism with evidence of right heart strain.    Large right-sided pleural effusion with associated compressive atelectasis.    Air bronchograms within the right lower lobe may represent some component of infectious pneumonia.    Peripheral airspace opacity in the left lower lobe may represent areas of pulmonary infarction.    Given the degree of atelectasis suggest  outpatient PET-CT scan after the patient's symptoms resolve to look for primary pulmonary tumor.    Case discussed with Dr. Moseley at 05/04/2018 on 12:05 a.m.

## 2018-05-04 NOTE — PLAN OF CARE
Returned from cath lab, drowsy, sedated, will open eyes to name, looks at me then falls back to sleep, ekos x2 infusing into rt jug introducer, ateplase  1mgm hr via pump x2  to the introducer  , dressing dry and intact, no swelling, body cool, dry, , extra blankets on, ap 111 at fib, pt without co of sob or chest pain, denies pain, course ant lobes, decreased bases, . Rt, weak cough,pulses present, doppler left pedal as earlier, but I think I can feel a weak pulse, neuro status intact, still confused to month, year

## 2018-05-04 NOTE — PLAN OF CARE
[id number] sheaths removed, tips intact, pressure held, pressure needed to be held 45 min before bleeding completely stopped, pressure dressing applied, will monitor closely, bruising noted at neck but there when occlusive dressing removed, small amt bruising noted at the ins sites of sheaths,      1900 dressings dry and intact , no evidence of hematoma no bleeding

## 2018-05-04 NOTE — PROGRESS NOTES
" [facility]  Adult Nutrition  Progress Note    SUMMARY       Recommendations    Recommendation/Intervention:   1. Change diet to Cardiac.   2. Encourage good intake at meals.   3. Add Boost Plus bid.    Goals:   Pt will consume at least 50% intake at meals  Nutrition Goal Status: new  Communication of RD Recs: reviewed with RN (Lisa)    Reason for Assessment  Reason for Assessment: identified at risk by screening criteria ([facility])  Diagnosis:  (B pulmonary embolism)  Relevant Medical History: HTN, renal disorder, thyroid disease, CAD, cardiac surgery, appendectomy  General Information Comments: Pt just started on Regu;ar diet. Was NPO this am for cath lab. pt with recent poor appetite.     Nutrition Risk Screen  Nutrition Risk Screen: no indicators present    Nutrition/Diet History  Food Preferences: no Taoist or cultural food prefs identified  Do you have any cultural, spiritual, Taoist conflicts, given your current situation?: none  Factors Affecting Nutritional Intake: decreased appetite    Anthropometrics  Temp: 96.3 °F (35.7 °C)  Height Method: Stated  Height: 5' 3" (160 cm)  Height (inches): 63 in  Weight Method: Bed Scale  Weight: 48.9 kg (107 lb 12.9 oz)  Weight (lb): 107.81 lb  Ideal Body Weight (IBW), Female: 115 lb  % Ideal Body Weight, Female (lb): 93.75 lb  BMI (Calculated): 19.1  BMI Grade: 18.5-24.9 - normal     Lab/Procedures/Meds  Pertinent Labs Reviewed: reviewed  Pertinent Labs Comments: Na 129L, Glu 123H, Ca 8.0L, Alb 1.8L  Pertinent Medications Reviewed: reviewed    Physical Findings/Assessment  Overall Physical Appearance: weak  Tubes:  (none)  Oral/Mouth Cavity: WDL  Skin:  (Charbel 16-intact)    Estimated/Assessed Needs  Weight Used For Calorie Calculations: 48.9 kg (107 lb 12.9 oz)  Energy Calorie Requirements (kcal): 1712  Energy Need Method: Kcal/kg (35 kcal/kg)  Protein Requirements: 59g (1.2g/kg)  Weight Used For Protein Calculations: 48.9 kg (107 lb 12.9 oz)  Fluid " Need Method: RDA Method  RDA Method (mL): 1712    Nutrition Prescription Ordered  Current Diet Order: Regular    Evaluation of Received Nutrient/Fluid Intake  Energy Calories Required: not meeting needs  Protein Required: not meeting needs  Fluid Required: not meeting needs  Comments: LBM 4/26  % Intake of Estimated Energy Needs: 0 - 25 %  % Meal Intake: 0 - 25 %    Nutrition Risk  Level of Risk/Frequency of Follow-up:  (2xweekly)     Assessment and Plan  Nutrition Problem  Inadequate energy intake    Related to (etiology):   Decreased appetite    Signs and Symptoms (as evidenced by):   Poor po intake    Interventions/Recommendations (treatment strategy):  See recs    Nutrition Diagnosis Status:   New     Monitor and Evaluation  Food and Nutrient Intake: food and beverage intake  Food and Nutrient Adminstration: diet order  Physical Activity and Function: nutrition-related ADLs and IADLs  Anthropometric Measurements: weight  Biochemical Data, Medical Tests and Procedures: electrolyte and renal panel  Nutrition-Focused Physical Findings: overall appearance     Nutrition Follow-Up  RD Follow-up?: Yes

## 2018-05-04 NOTE — ASSESSMENT & PLAN NOTE
RVR 2/2 extensive PEs  Continue home Amio and Cardizem  Not on OAC 2/2 falls, will require OAC initiation upon discharge for PE treatment - likely coumadin given reversibility

## 2018-05-04 NOTE — PT/OT/SLP PROGRESS
Occupational Therapy      Patient Name:  Elena Patton   MRN:  [id number]    Patient not seen today secondary to  (post cath bedrest). Will follow-up 5/5/2018.    Julieta Gibbs OT  5/4/2018

## 2018-05-04 NOTE — ASSESSMENT & PLAN NOTE
Urine is nitrite neg, +WBC, + leuks, with occasional bacteria and yeast  On Vanc, Zosyn  Infectious disease consulted to assist

## 2018-05-04 NOTE — HOSPITAL COURSE
05/04/2018 Per HPI.     5/5/2018:Overnight no acute events. S/p EKOS catheter directed thrombolysis with improvement in PASP 50-30. Feels better. Hemodynamically stable. On heparin gtt currently.     5/6/2018: Overnight no acute events. S/p thoracentesis this AM and hypotensive. Breathing fine.    05/07/2018 Hypotension persists. BB held. AF on tele with HR 90s-110s. Remains on heparin gtt. Doing well from cardiac perspective.     5/9/2018 Reconsulted due to hypotension with positive orthostatics. OOB this AM with BP prior to standing 140/103 with weakness after standing. BP sitting 100/60. Given IVF overnight x 12 hours. HR currently 120s with stable BP while lying. Complains of right leg pain     5/10/2018 On IVF overnight. Resting with eyes closed this AM. Orthostatics this AM with lying /58 and sitting 111/64. No standing due to profound weakness yesterday. PT consulted and will plan on full orthostatics later today. Creatinine .8. BP stable. HR 110s. Will plan to transition to oral Eliquis from Heparin

## 2018-05-04 NOTE — PLAN OF CARE
Pt initially hard to arouse due to her sleepiness,but finally woke up , and was responsive, alert, oriented to name, fact she is in hospital, and month, disoriented to year, and date, reoriented, able to relate history but not sure of correct order of events, no co of chest pain or sob, ap 98 at fib, resp 16 unlabored, decreased breath sounds , more in rt lobes , greater post, crackles left ant lobe and left post, occ non prod cough,sat's 95%, heparin drip continues 18 units /kg/hr, noted am labs, low sodium, elevated wbc, has had urinary tract infection before, stated she can not get rid of it, marley in for urinary retention, noted bruising , more so rt arm, calf, and abrasion left ant 3rd toe, red dermatitis , inner buttocks, mepelex to sacral area for protection

## 2018-05-04 NOTE — ED PROVIDER NOTES
Encounter Date: 5/3/2018    SCRIBE #1 NOTE: ISriram, am scribing for, and in the presence of, Dr. Moseley.       History     Chief Complaint   Patient presents with    Shortness of Breath     To ER per [facility] with c/o SOB and chest, abd pain from [facility].     This is a 77 y.o. female who has a past medical history of Anticoagulant long-term use; Atrial fibrillation; Coronary artery disease; Hypertension; Renal disorder; and Thyroid disease.   The patient presents to the Emergency Department with left sided chest pain starting tonight. The pain is severe and constant. Patient is on 2 L of oxygen at home. EMS reports one episode of vomiting today at nursing home. Patient brought via EMS from [facility].    Symptoms are associated with shortness of breath, dizziness, lightheadedness.  Pt denies fever.   Symptoms are aggravated by inspiration.  Symptoms are relieved by nothing.   Patient has prior history of similar symptoms. Patient was recently admitted for pneumonia.  Pt has a past surgical history that includes Cardiac surgery; Hysterectomy; Appendectomy; and Joint replacement.      The history is provided by the EMS personnel and the patient.     Review of patient's allergies indicates:  No Known Allergies  Past Medical History:   Diagnosis Date    Anticoagulant long-term use     Atrial fibrillation     Coronary artery disease     Hypertension     Renal disorder     Thyroid disease      Past Surgical History:   Procedure Laterality Date    APPENDECTOMY      CARDIAC SURGERY      HYSTERECTOMY      JOINT REPLACEMENT       No family history on file.  Social History   Substance Use Topics    Smoking status: Never Smoker    Smokeless tobacco: Never Used    Alcohol use No     Review of Systems   Constitutional: Negative for fever.   HENT: Negative for sore throat.    Respiratory: Positive for shortness of breath.    Cardiovascular: Positive for chest pain.    Gastrointestinal: Positive for vomiting.   Genitourinary: Negative for dysuria.   Musculoskeletal: Negative for back pain.   Skin: Negative for rash.   Neurological: Positive for dizziness and light-headedness.   Hematological: Does not bruise/bleed easily.       Physical Exam     Initial Vitals   BP Pulse Resp Temp SpO2   -- -- -- -- --      MAP       --         Physical Exam    Nursing note and vitals reviewed.  Constitutional: She appears well-developed. She is not diaphoretic. No distress.   Thin. No distress.   HENT:   Head: Normocephalic and atraumatic.   Dry tongue   Eyes: Conjunctivae are normal. Pupils are equal, round, and reactive to light.   Neck: Normal range of motion. Neck supple.   Cardiovascular: Normal heart sounds and intact distal pulses.   Tachycardic, irregular rhythm   Pulmonary/Chest: Breath sounds normal. She exhibits tenderness.   Diffuse chest wall tenderness   Abdominal: Soft. Bowel sounds are normal. She exhibits no distension. There is tenderness.   Epigastric and suprapubic tenderness   Musculoskeletal: Normal range of motion. She exhibits no edema or tenderness.   Partial left thumb amputation. Warm extremities.   Lymphadenopathy:     She has no cervical adenopathy.   Neurological: She is alert and oriented to person, place, and time. She has normal strength.   Skin: Skin is warm and dry.   Bruising on bilateral LE of different ages   Psychiatric: She has a normal mood and affect. Thought content normal.         ED Course   Procedures  Labs Reviewed   CBC W/ AUTO DIFFERENTIAL - Abnormal; Notable for the following:        Result Value    WBC 21.15 (*)     MCHC 31.3 (*)     RDW 18.0 (*)     Gran # (ANC) 18.6 (*)     Lymph # 0.7 (*)     Mono # 1.5 (*)     Gran% 88.0 (*)     Lymph% 3.5 (*)     All other components within normal limits   COMPREHENSIVE METABOLIC PANEL - Abnormal; Notable for the following:     Sodium 129 (*)     Chloride 90 (*)     Glucose 154 (*)     Calcium 8.5 (*)      Albumin 2.3 (*)     Alkaline Phosphatase 148 (*)     AST 69 (*)     All other components within normal limits   B-TYPE NATRIURETIC PEPTIDE - Abnormal; Notable for the following:      (*)     All other components within normal limits   URINALYSIS - Abnormal; Notable for the following:     Protein, UA Trace (*)     Bilirubin (UA) 1+ (*)     Leukocytes, UA 1+ (*)     All other components within normal limits   URINALYSIS MICROSCOPIC - Abnormal; Notable for the following:     WBC, UA 50 (*)     Yeast, UA Moderate (*)     All other components within normal limits   CULTURE, BLOOD   CULTURE, BLOOD   CULTURE, URINE   TROPONIN I   TSH   LACTIC ACID, PLASMA   PROCALCITONIN   APTT   PROTIME-INR   LACTIC ACID, PLASMA   ANTI-XA HEPARIN MONITORING   APTT     EKG Readings: (Independently Interpreted)   EKG: Afib with RVR at 126 bpm, right axis deviation, nl intervals, RBBB, no hypertrophy, no ST-T changes as read by me (Dr. Moseley).      Imaging Results          CTA Chest Non-Coronary (PE Study) (Final result)  Result time 05/04/18 00:07:19    Final result by Trevin Burleson MD (05/04/18 00:07:19)                 Impression:      Extensive bilateral pulmonary embolism with evidence of right heart strain.    Large right-sided pleural effusion with associated compressive atelectasis.    Air bronchograms within the right lower lobe may represent some component of infectious pneumonia.    Peripheral airspace opacity in the left lower lobe may represent areas of pulmonary infarction.    Given the degree of atelectasis suggest outpatient PET-CT scan after the patient's symptoms resolve to look for primary pulmonary tumor.    Case discussed with Dr. Moseley at 05/04/2018 on 12:05 a.m.      Electronically signed by: Trevin Burleson MD  Date:    05/04/2018  Time:    00:07             Narrative:    EXAMINATION:  CTA CHEST NON CORONARY    CLINICAL HISTORY:  Bacterial pneumonia, not elsewhere classified;    TECHNIQUE:  Low dose axial images,  sagittal and coronal reformations were obtained from the thoracic inlet to the lung bases following the IV administration of 100 mL of Omnipaque 350.  Contrast timing was optimized to evaluate the pulmonary arteries.  MIP images were performed.    COMPARISON:  Chest x-ray dated 05/03/2018 factors    FINDINGS:  CT pulmonary embolism examination:    There are extensive filling defects throughout the pulmonary tree involving the segmental vessels to the right upper, right lower, left upper and left lower lobes.  No saddle embolism component is present.  There is bowing of the interventricular septum to the left with dilatation of the right-sided chambers.  There is also reflux of contrast into the IVC and the azygos system.    CT chest examination:    The thyroid gland is unremarkable.  The base of the neck is within normal limits.  The supraclavicular regions are unremarkable.    The trachea is unremarkable.  No definitive endobronchial lesion is identified on the left.  Evaluation for endobronchial lesion involving the right middle and lower lobes is significantly difficult given the atelectatic changes present.    There is a lower lead pacemaker present with the tips terminating in the right atrium and right ventricle.  No pericardial effusions identified.  There is enlargement of the right-sided chambers as described above.    The thoracic aorta is normal in caliber.  There is an aberrant right subclavian artery.  The remainder of the great vessels appear unremarkable.    There is no evidence of lymphadenopathy in the chest.  The axillary regions are within normal limits.    There is a large right-sided pleural effusion with associated compressive atelectasis.  There is trace left-sided pleural effusion.  There is no evidence of a pneumothorax.  There is no evidence of pneumomediastinum.    There is diffuse emphysema.  There also air bronchograms within the right lower lobe.  There are peripheral airspace opacities  in the left lower lobe.  There are scattered ground-glass airspace opacities in the remainder of the lung fields.    The esophagus is within normal limits.  There is nodular appearance of the liver.  The remainder of the visualized upper abdominal structures are unremarkable.    The chest wall is within normal limits.  The osseous structures are within normal limits.                               X-Ray Chest AP Portable (Final result)  Result time 05/03/18 21:27:25    Final result by Tino Moody MD (05/03/18 21:27:25)                 Impression:      1. Moderate right pleural effusion with associated compressive atelectasis and/or lower lobe consolidation.  Finding is superimposed upon edema bilaterally.  Correlation advised.      Electronically signed by: Tino Moody MD  Date:    05/03/2018  Time:    21:27             Narrative:    EXAMINATION:  XR CHEST AP PORTABLE    CLINICAL HISTORY:  Chest pain, unspecified    TECHNIQUE:  Single frontal view of the chest was performed.    COMPARISON:  04/22/2018    FINDINGS:  The cardiomediastinal silhouette is not enlarged noting calcification of the aorta.  Left chest wall pacer noted..  There is a right pleural effusion, noting possible trace left effusion..  The trachea is midline.  The lungs are symmetrically expanded bilaterally with patchy increased interstitial and parenchymal attenuation projected over the right hemothorax.  There is left basilar subsegmental atelectasis or scarring.  Superimposed right lower lung zone consolidation not excluded..  There is no pneumothorax.  The osseous structures are remarkable for degenerative change..                                   Medical Decision Making:   History:   Old Medical Records: I decided to obtain old medical records.  Old Records Summarized: records from previous admission(s).       <> Summary of Records: On  prior chart review, patient was just admitted for right lower lobe pneumonia, UTI, severe sepsis  on 4/19/18.  Initial Assessment:   This is an emergent evaluation of a 77 y.o.female patient with presentation of shortness of breath, chest pain, Afib.   Initial differentials include but are not limited to: A. Fib with RVR, dehydration, ACS, CHF exacerbation, pneumonia.   Plan: CHF workup, Lopressor 5mg IV, IV fluids    Clinical Tests:   Lab Tests: Ordered and Reviewed  Radiological Study: Ordered and Reviewed  Medical Tests: Ordered and Reviewed  ED Management:  10:10 PM   WBC count reviewed. Shows WBC count of 21,000, increased from 1 week ago, with left shift. Hyponatremia to 129. CXR shows large right pleural effusion. Considering recent admission for right lower lobe pneumonia, highly considering parapneumonic effusion. Will order CT of chest to further evaluate. I also consider sepsis at this time and will add on blood cultures, lactic acid and vanco/zosyn.     11:56 PM   On UA, has WBCs 50 and occasional bacteria    12:06 AM   Radiologist states patient has extensive bilateral PEs with right heart strain and pulmonary infarction. Also notes right pleural effusion. Right lower lobe is compressed. There are some air bronchograms present, which may be due to pulmonary infarction and/or infectious etiology and/or post-obstructive pathology. Will order Lovenox and call cardiology.  Other:   I have discussed this case with another health care provider.       <> Summary of the Discussion: 12:50 AM - Dr. Roya Mary in cardiology says admit to medicine. Dr. Mary will stay on as consult. Agrees with heparin bolus and drip. Will admit to ICU.    1AM - case discussed with [facility], Dr. Hirsch, who will evaluate and admit the patient to their service.              Attending Attestation:         Attending Critical Care:   Critical Care Times:   ==============================================================  · Total Critical Care Time - exclusive of procedural time: 50  minutes.  ==============================================================                 Clinical Impression:     1. Bilateral pulmonary embolism    2. Atrial fibrillation    3. Chest pain    4. Sepsis, due to unspecified organism    5. HCAP (healthcare-associated pneumonia)    6. Leukocytosis, unspecified type    7. Hyponatremia    8. Pulmonary embolism with infarction    9. Pleural effusion, right          Disposition:   Disposition: Admitted  Condition: Serious    I, Dr. Donavan Moseley, personally performed the services described in this documentation.   All medical record entries made by the scribe were at my direction and in my presence.   I have reviewed the chart and agree that the record is accurate and complete.   Donavan Moseley MD.                 Donavan Moseley MD  05/04/18 0136

## 2018-05-05 NOTE — PLAN OF CARE
Problem: Physical Therapy Goal  Goal: Physical Therapy Goal  Goals to be met by: 2018     Patient will increase functional independence with mobility by performin. Supine to sit with Stand-by Assistance  2. Sit to supine with Stand-by Assistance  3. Sit to stand transfer with Stand-by Assistance  4. Gait  x 300 feet with Stand-by Assistance using Rolling Walker.        Outcome: Ongoing (interventions implemented as appropriate)  Patient drowsy this AM with c/o dizziness after sitting at EOB; BP low 100's/low to mid 60's and at one point short period of tachycardia to ~165; pt unable to tolerate sitting and had to lie back down; will cont with POC and return to SNF post acute hospital stay to maximize functional independence.

## 2018-05-05 NOTE — PROGRESS NOTES
Progress Note  [facility]    Admit Date: 5/3/2018   LOS: 1 day     SUBJECTIVE:     Follow-up For:  Extensive bilateral PE    Patient was interviewed and examined today. Patient is s/p cath lab thrombolysis yesterday which went well.  Rodriguez draining clear urine. She complains of chronic back pain and some bilateral chronic foot pain.       Scheduled Meds:   albuterol-ipratropium 2.5mg-0.5mg/3mL  3 mL Nebulization Q6H    amiodarone  200 mg Oral BID    amitriptyline  10 mg Oral BID    aspirin  325 mg Oral Daily    cetirizine  10 mg Oral Daily    DULoxetine  30 mg Oral Daily    gabapentin  600 mg Oral TID    levothyroxine  25 mcg Oral Before breakfast    lidocaine  1 patch Transdermal Q24H    lubiprostone  24 mcg Oral BID WM    metoprolol succinate  25 mg Oral Daily    pantoprazole 40 mg in dextrose 5 % 100 mL infusion (ready to mix system)  40 mg Intravenous Daily    piperacillin-tazobactam (ZOSYN) IVPB  4.5 g Intravenous Q8H    vancomycin (VANCOCIN) IVPB  15 mg/kg Intravenous Q24H     Continuous Infusions:   heparin (porcine) in D5W 18 Units/kg/hr (05/05/18 0600)     PRN Meds:acetaminophen, benzonatate, heparin (PORCINE), heparin (PORCINE), sodium chloride 0.9%    Review of patient's allergies indicates:  No Known Allergies    Review of Systems  Pos: back pain and bilateral foot pain  Neg: CP, SOB    OBJECTIVE:     Vital Signs (Most Recent)  Temp: 97.5 °F (36.4 °C) (05/05/18 0300)  Pulse: (!) 117 (05/05/18 0727)  Resp: (!) 22 (05/05/18 0727)  BP: (!) 127/97 (05/05/18 0600)  SpO2: 95 % (05/05/18 0727)    Vital Signs Range (Last 24H):  Temp:  [96.3 °F (35.7 °C)-98 °F (36.7 °C)]   Pulse:  []   Resp:  [11-36]   BP: ()/(64-97)   SpO2:  [88 %-97 %]     I & O (Last 24H):    Intake/Output Summary (Last 24 hours) at 05/05/18 0815  Last data filed at 05/05/18 0605   Gross per 24 hour   Intake          2664.79 ml   Output             1375 ml   Net          1289.79 ml     Wt Readings from Last  3 Encounters:   05/05/18 52.5 kg (115 lb 11.9 oz)   04/26/18 48.8 kg (107 lb 9.4 oz)   12/11/17 49 kg (108 lb)     Physical Exam:  Gen: NAD  HEENT: NC, AT  Chest: decreased breath sounds b/l, mild inspiratory wheeze and mild bibasilar rales b/l   CVS: RRR, no m/r/g  Abdomen: soft, NT, ND, no masses, +BS  Ext: no edema, pulses 2+   Skin: ro rashes  Neuro: A&O x 3, CN's grossly intact, sensation intact to light touch  Psych: Normal mood, affect, thought content    Laboratory:  LABS  CBC    Recent Labs  Lab 05/03/18 2114 05/04/18  0401 05/05/18  0008   WBC 21.15* 16.37* 12.18   RBC 4.35 3.88* 3.54*   HGB 12.7 11.5* 10.8*   HCT 40.6 36.4* 33.6*    273 242   MCV 93 94 95   MCH 29.2 29.6 30.5   MCHC 31.3* 31.6* 32.1     BMP    Recent Labs  Lab 05/03/18 2114 05/04/18  0401   * 129*   K 5.1 4.3   CO2 26 27   CL 90* 95   BUN 23 18   CREATININE 0.8 0.8   * 123*         Recent Labs  Lab 05/03/18 2114 05/04/18  0401   CALCIUM 8.5* 8.0*   MG  --  1.8   PHOS  --  3.4     LFT    Recent Labs  Lab 05/03/18 2114 05/04/18  0401   PROT 6.0 5.4*   ALBUMIN 2.3* 1.8*   BILITOT 0.8 0.7   AST 69* 36   ALKPHOS 148* 115   ALT 30 21       COAGS    Recent Labs  Lab 05/04/18  0058 05/04/18  0401 05/05/18  0559   INR 1.1  --   --    APTT 28.1 51.9* 39.1*     CE    Recent Labs  Lab 05/03/18 2114   TROPONINI 0.010     BNP    Recent Labs  Lab 05/03/18 2114   *       LAST HbA1c  Lab Results   Component Value Date    HGBA1C 4.8 04/19/2018         Diagnostic Results:    IP CONSULT TO CARDIOLOGY    ASSESSMENT/PLAN:     Neuro/Psych:  · A&O x 3 (did not know year)   · Avoid sedating pain meds if possible  · Pt is on cymbalta and amytriptiline. Amytriptiline is less favorable in geriatric population. Will consider weaning off.     Cardiovascular: (Hypertension, Chronic atrial fibrillation w/ hx of RVR)   · Home meds include amiodarone and diltiazem.  Will continue them to help ensure rate control &, thus, rythym control  ·  ECHO 4/2018 w/ EF 45%, grade 3 diastolic dysfunction  · Right heart strain evident on CTA  · Resuscitated with fluids, monitor BP  · Pt with pacemaker     Pulmonary: (Bilat Pulm Embolisms, ?HAP)   · DuoNeb q 6 hours scheduled  · ABx: Vanc, zosyn  Moderate right-sided pleural effusion, will consult Pulm to evaluate for possible thoracentesis    GI/NUT:   · NPO   · GI Proph -- Protonix 40 mg IV daily while NPO     Renal/Electrolytes:   ·  cc  · Rodriguez resumed due to hx of urinary retention     Infectious Disease: (HAP)   ·  Pt meets sepsis criteria with HR, WBC and source  ·  Concern for recurrent RLL PNA  · S/p 30 cc/kg of fluid  · Leukocytosis improving: WBC 16.3-> 12.1  · Sputum culture pending   · Blood cultures NGTD, urine cultures growing candida, asymptomatic  · Pt with hx of ESBL UTI     Hem/Coag: (Anemia, DVT Proph)   · Current Hgb =       · 10.8/33.6  ·  H&H stable  ·  On IV Heparin GGT for bilat pulm embolism with protocol for checking  PTT  ·  Cards on board; s/p cath lab thrombolysis     Cards recommending long-term AC with eliquis at discharge  ·  DVT Proph - Heparin     Endocrine:   · Conservative glycemic control, goal < 180 mg/dl     Musculoskeletal/Skin:   · Skin without acute findings   · Repositioning per ICU protocol    Chronic back and b/l foot pain with Hx of neuropathy on percocet 5 mg and gabapentin at home   Restart gabapentin but holding percocet for now    Dispo: Possible transfer to floor today. F/u Pulm for possible thoracentesis    5/5/2018 Tushar Cho MD  9:43 AM

## 2018-05-05 NOTE — PT/OT/SLP EVAL
Physical Therapy Evaluation    Patient Name:  Elena Patton   MRN:  [id number]    Recommendations:     Discharge Recommendations:  nursing facility, skilled   Discharge Equipment Recommendations: 3-in-1 commode   Barriers to discharge: None    Assessment:     Elena Patton is a 77 y.o. female admitted with a medical diagnosis of Bilateral pulmonary embolism.  She presents with the following impairments/functional limitations:  weakness, impaired endurance, impaired self care skills, impaired functional mobilty, gait instability, impaired balance, decreased lower extremity function, decreased upper extremity function, decreased safety awareness, impaired cardiopulmonary response to activity, decreased coordination Patient drowsy this AM with c/o dizziness after sitting at EOB; BP low 100's/low to mid 60's and at one point short period of tachycardia to ~165; pt unable to tolerate sitting and had to lie back down; will cont with POC and return to SNF post acute hospital stay to maximize functional independence.   .    Rehab Prognosis:  good; patient would benefit from acute skilled PT services to address these deficits and reach maximum level of function.      Recent Surgery: Procedure(s) (LRB):  Venogram (N/A)      Plan:     During this hospitalization, patient to be seen 6 x/week to address the above listed problems via gait training, therapeutic activities, therapeutic exercises  · Plan of Care Expires:  06/05/18   Plan of Care Reviewed with: patient    Subjective     Communicated with nurse prior to session.  Patient found supine with HOB elevated upon PT entry to room, agreeable to evaluation.      Chief Complaint: sleepy  Patient comments/goals: none stated  Pain/Comfort:  · Pain Rating 1: 0/10  · Pain Addressed 1: Pre-medicate for activity  · Pain Rating Post-Intervention 1: 0/10    Patients cultural, spiritual, Christianity conflicts given the current situation: none    Living Environment:  Pt is from [place] and  was living alone until a few months ago. Now living with son and daughter in law in a single story home with no steps to enter  Prior to admission, patients level of function was Mod I, required assistance bathing.  Patient has the following equipment: shower chair, walker, rolling.  DME owned (not currently used): none.  Upon discharge, patient will have assistance from son and daughter in law.       Objective:     Patient found with: marley catheter, central line, peripheral IV (ICU monitoring)     General Precautions: Standard, fall   Orthopedic Precautions:N/A   Braces: N/A     Exams:  · Cognitive Exam:  Patient is oriented to Person and and that she is in a hospital, follows one step commands   · Gross Motor Coordination:  decreased timing 2/2 being drowsy-had pain meds  · Postural Exam:  Patient presented with the following abnormalities:    · -       Rounded shoulders  · -       Forward head  · RLE Strength: 3+ based on function  · RLE ROM: WFL  · LLE Strength: 3+ based on function  · L:LE ROM: WFL  · Balance: static sit maxA     Functional Mobility:  · Bed Mobility:     · Rolling Left:  minimum assistance  · Rolling Right: minimum assistance  · Scooting: moderate assistance  · Supine to Sit: maximal assistance  · Sit to Supine: maximal assistance    AM-PAC 6 CLICK MOBILITY  Total Score:9       Therapeutic Activities and Exercises:   Patient drowsy upon entry; had received pain meds; bed mob as above with pt pulling on therapist to roll with Bonnie and max A for sup to sit; pt c/o dizziness upon sitting EOB with maxA for about 1-2 minutes before returning to supine; BP low 100's/low to mid 60's and at one point short period of tachycardia to ~165; pt unable to tolerate sitting and had to lie back down with max A.    Patient left right sidelying with all lines intact, call button in reach, bed alarm on and nurse notified.    GOALS:    Physical Therapy Goals        Problem: Physical Therapy Goal    Goal Priority  Disciplines Outcome Goal Variances Interventions   Physical Therapy Goal     PT/OT, PT Ongoing (interventions implemented as appropriate)     Description:  Goals to be met by: 2018     Patient will increase functional independence with mobility by performin. Supine to sit with Stand-by Assistance  2. Sit to supine with Stand-by Assistance  3. Sit to stand transfer with Stand-by Assistance  4. Gait  x 300 feet with Stand-by Assistance using Rolling Walker.                          History:     Past Medical History:   Diagnosis Date    Anticoagulant long-term use     Atrial fibrillation     Coronary artery disease     Hypertension     Renal disorder     Thyroid disease        Past Surgical History:   Procedure Laterality Date    APPENDECTOMY      CARDIAC SURGERY      HYSTERECTOMY      JOINT REPLACEMENT         Clinical Decision Making:     History  Co-morbidities and personal factors that may impact the plan of care Examination  Body Structures and Functions, activity limitations and participation restrictions that may impact the plan of care Clinical Presentation   Decision Making/ Complexity Score   Co-morbidities:   [] Time since onset of injury / illness / exacerbation  [x] Status of current condition  []Patient's cognitive status and safety concerns    [] Multiple Medical Problems (see med hx)  Personal Factors:   [] Patient's age  [] Prior Level of function   [] Patient's home situation (environment and family support)  [] Patient's level of motivation  [] Expected progression of patient      HISTORY:(criteria)    [] 45667 - no personal factors/history    [x] 85186 - has 1-2 personal factor/comorbidity     [] 02867 - has >3 personal factor/comorbidity     Body Regions:  [x] Objective examination findings  [] Head     []  Neck  [] Trunk   [] Upper Extremity  [] Lower Extremity    Body Systems:  [x] For communication ability, affect, cognition, language, and learning style: the assessment of  the ability to make needs known, consciousness, orientation (person, place, and time), expected emotional /behavioral responses, and learning preferences (eg, learning barriers, education  needs)  [x] For the neuromuscular system: a general assessment of gross coordinated movement (eg, balance, gait, locomotion, transfers, and transitions) and motor function  (motor control and motor learning)  [x] For the musculoskeletal system: the assessment of gross symmetry, gross range of motion, gross strength, height, and weight  [] For the integumentary system: the assessment of pliability(texture), presence of scar formation, skin color, and skin integrity  [x] For cardiovascular/pulmonary system: the assessment of heart rate, respiratory rate, blood pressure, and edema     Activity limitations:    [] Patient's cognitive status and saf ety concerns          [x] Status of current condition      [] Weight bearing restriction  [] Cardiopulmunary Restriction    Participation Restrictions:   [] Goals and goal agreement with the patient     [] Rehab potential (prognosis) and probable outcome      Examination of Body System: (criteria)    [] 49355 - addressing 1-2 elements    [] 65010 - addressing a total of 3 or more elements     [x] 14080 -  Addressing a total of 4 or more elements         Clinical Presentation: (criteria)  Evolving - 48321     On examination of body system using standardized tests and measures patient presents with (CHOOSE ONE) elements from any of the following: body structures and functions, activity limitations, and/or participation restrictions.  Leading to a clinical presentation that is considered stable and/or uncomplicated                              Clinical Decision Making  (Eval Complexity):  Low- 01679     Time Tracking:     PT Received On: 05/05/18  PT Start Time: 1127     PT Stop Time: 1142  PT Total Time (min): 15 min     Billable Minutes: Evaluation 15 minutes      Avril Velez  PT  05/05/2018

## 2018-05-05 NOTE — PROGRESS NOTES
[facility]  Cardiology  Progress Note    Patient Name: Elena Patton  MRN: [id number]  Admission Date: 5/3/2018  Hospital Length of Stay: 1 days  Code Status: Full Code   Attending Physician: Riky Santos III, MD   Primary Care Physician: Gael Chao MD  Expected Discharge Date:   Principal Problem:Bilateral pulmonary embolism    Subjective:     Hospital Course:   05/04/2018 Per HPI.     5/5/2018:  Overnight no acute events. S/p EKOS catheter directed thrombolysis with improvement in PASP 50-30. Feels better. Hemodynamically stable. On heparin gtt currently.         Review of Systems   Constitution: Positive for weakness and malaise/fatigue.   HENT: Negative for congestion.    Eyes: Negative for blurred vision.   Cardiovascular: Positive for dyspnea on exertion and palpitations. Negative for chest pain, claudication, cyanosis, irregular heartbeat, leg swelling, near-syncope, orthopnea, paroxysmal nocturnal dyspnea and syncope.   Respiratory: Negative for shortness of breath.    Endocrine: Negative for polyuria.   Hematologic/Lymphatic: Negative for bleeding problem.   Skin: Negative for itching and rash.   Musculoskeletal: Positive for back pain and muscle weakness. Negative for joint swelling and muscle cramps.   Gastrointestinal: Negative for abdominal pain, hematemesis, hematochezia, melena, nausea and vomiting.   Genitourinary: Negative for dysuria and hematuria.   Neurological: Negative for dizziness, focal weakness, headaches, light-headedness and loss of balance.   Psychiatric/Behavioral: Negative for depression. The patient is not nervous/anxious.      Objective:     Vital Signs (Most Recent):  Temp: 97 °F (36.1 °C) (05/05/18 1110)  Pulse: 106 (05/05/18 1315)  Resp: 17 (05/05/18 1315)  BP: (!) 76/53 (05/05/18 1315)  SpO2: 96 % (05/05/18 1315) Vital Signs (24h Range):  Temp:  [96.4 °F (35.8 °C)-98 °F (36.7 °C)] 97 °F (36.1 °C)  Pulse:  [] 106  Resp:  [11-35] 17  SpO2:  [93 %-97 %]  96 %  BP: ()/(48-97) 76/53     Weight: 52.5 kg (115 lb 11.9 oz)  Body mass index is 20.5 kg/m².     SpO2: 96 %  O2 Device (Oxygen Therapy): nasal cannula      Intake/Output Summary (Last 24 hours) at 05/05/18 1452  Last data filed at 05/05/18 1400   Gross per 24 hour   Intake          2865.19 ml   Output             1450 ml   Net          1415.19 ml       Lines/Drains/Airways     Drain                 Urethral Catheter 05/04/18 0403 14 Fr. 1 day          Peripheral Intravenous Line                 Peripheral IV - Single Lumen 05/03/18 2051 Left Forearm 1 day         Peripheral IV - Single Lumen 05/03/18 2052 Right Wrist 1 day                Physical Exam   Constitutional: She is oriented to person, place, and time. She appears well-developed and well-nourished.   HENT:   Head: Normocephalic and atraumatic.   Neck: Neck supple. No JVD present.   Cardiovascular: Normal rate and normal heart sounds.  An irregularly irregular rhythm present.   Pulses:       Carotid pulses are 2+ on the right side, and 2+ on the left side.       Radial pulses are 2+ on the right side, and 2+ on the left side.        Femoral pulses are 2+ on the right side, and 2+ on the left side.       Posterior tibial pulses are 1+ on the right side, and 1+ on the left side.   Pulmonary/Chest: Effort normal and breath sounds normal.   Abdominal: Soft. Bowel sounds are normal.   Musculoskeletal: She exhibits no edema.   Neurological: She is alert and oriented to person, place, and time.   Skin: Skin is warm and dry.   Psychiatric: She has a normal mood and affect. Her behavior is normal. Thought content normal.       Significant Labs:   BMP:   Recent Labs  Lab 05/03/18 2114 05/04/18  0401   * 123*   * 129*   K 5.1 4.3   CL 90* 95   CO2 26 27   BUN 23 18   CREATININE 0.8 0.8   CALCIUM 8.5* 8.0*   MG  --  1.8   , CMP   Recent Labs  Lab 05/03/18 2114 05/04/18  0401   * 129*   K 5.1 4.3   CL 90* 95   CO2 26 27   * 123*    BUN 23 18   CREATININE 0.8 0.8   CALCIUM 8.5* 8.0*   PROT 6.0 5.4*   ALBUMIN 2.3* 1.8*   BILITOT 0.8 0.7   ALKPHOS 148* 115   AST 69* 36   ALT 30 21   ANIONGAP 13 7*   ESTGFRAFRICA >60 >60   EGFRNONAA >60 >60   , CBC   Recent Labs  Lab 05/03/18  2114 05/04/18  0401 05/05/18  0008   WBC 21.15* 16.37* 12.18   HGB 12.7 11.5* 10.8*   HCT 40.6 36.4* 33.6*    273 242   , INR   Recent Labs  Lab 05/04/18  0058   INR 1.1   , Lipid Panel No results for input(s): CHOL, HDL, LDLCALC, TRIG, CHOLHDL in the last 48 hours. and Troponin   Recent Labs  Lab 05/03/18 2114   TROPONINI 0.010         Assessment and Plan:         * Bilateral pulmonary embolism    CTA chest with extensive filling defects throughout the pulmonary tree involving the segmental vessels to the right upper, right lower, left upper and left lower lobes.  Bowing of the interventricular septum to the left with dilatation of the right-sided chambers noted. Evidence of LLL infarction noted.        Continue Heparin gtt    S/p EKOS and improvement in PASP and symptoms.    Will require OAC prior to discharge         Chronic combined systolic and diastolic heart failure      Euvolemic on exam   Currently on BB        Essential hypertension    BP 80s-100s  Hold antihypertensives         Atrial fibrillation with RVR    RVR 2/2 extensive PEs  Continue home Amio and Cardizem  Not on OAC 2/2 falls, will require OAC initiation upon discharge for PE treatment - likely Eliquis  -Heparin gtt for now in case any procedures need to be done (i.e. Thoracentesis)            VTE Risk Mitigation         Ordered     heparin 25,000 units in dextrose 5% 250 mL (100 units/mL) infusion; FEMALE  Continuous      05/04/18 0334     heparin 25,000 units in dextrose 5% 250 mL (100 units/mL) bolus from bag; PRN BOLUS  As needed (PRN)      05/04/18 0334     heparin 25,000 units in dextrose 5% 250 mL (100 units/mL) bolus from bag; PRN BOLUS  As needed (PRN)      05/04/18 0334     IP VTE HIGH  RISK PATIENT  Once      05/04/18 0322          Ha Kam MD  Cardiology  [facility]

## 2018-05-05 NOTE — ASSESSMENT & PLAN NOTE
CTA chest with extensive filling defects throughout the pulmonary tree involving the segmental vessels to the right upper, right lower, left upper and left lower lobes.  Bowing of the interventricular septum to the left with dilatation of the right-sided chambers noted. Evidence of LLL infarction noted.        Continue Heparin gtt    S/p EKOS and improvement in PASP and symptoms.    Will require OAC prior to discharge

## 2018-05-05 NOTE — PLAN OF CARE
Problem: Patient Care Overview  Goal: Plan of Care Review  Outcome: Ongoing (interventions implemented as appropriate)  eckos for pul emboi, on tpa for 6 hrs, sheaths removed, remains on heparin drip, bld clots in legs, continues at fib, started on po metoprolol  , pt has very poor appetite, will need something to help stimulate her appetite, turn q 2hrs, mepelex to sacral area for protection, remains on 02, no chest pain or sob

## 2018-05-05 NOTE — PLAN OF CARE
Problem: Occupational Therapy Goal  Goal: Occupational Therapy Goal  Goals to be met by: 6/5/18     Patient will increase functional independence with ADLs by performing:    LE Dressing with Minimal Assistance.  Grooming while standing with Contact Guard Assistance.  Toileting from bedside commode with Contact Guard Assistance for hygiene and clothing management.   Supine to sit with Contact Guard Assistance.  Stand pivot transfers with Contact Guard Assistance.  Toilet transfer to bedside commode with Contact Guard Assistance.  Increased functional strength to [facility] for self care skills and functional mobility.  Upper extremity exercise program x10 reps per handout, with independence.    Outcome: Ongoing (interventions implemented as appropriate)  Pt would benefit from cont OT services in order to maximize functional independence. Recommending return to SNf at d/c; pt with limited tolerance for OOB axs this date 2/2 complaints of dizziness when seated EOB and requiring return to supine

## 2018-05-05 NOTE — PLAN OF CARE
Problem: Patient Care Overview  Goal: Plan of Care Review  Outcome: Ongoing (interventions implemented as appropriate)  Plan of care reviewed with patient; questions answered by ICU RN. Pt repositioned q 2 hours. Heparin drip infusing as per order.

## 2018-05-05 NOTE — PT/OT/SLP EVAL
Speech Language Pathology Evaluation  Bedside Swallow    Patient Name:  Elena Patton   MRN:  [id number]  Admitting Diagnosis: Bilateral pulmonary embolism    Recommendations:                 General Recommendations: F/u to ensure safety with diet advancement  Diet recommendations:  Dental Soft, Thin   Aspiration Precautions: 1 bite/sip at a time, Alternating bites/sips, Assistance with meals, Avoid talking while eating, Feed only when awake/alert, HOB to 90 degrees, Meds crushed in puree, Remain upright 30 minutes post meal, Small bites/sips and Standard aspiration precautions   General Precautions: Standard, fall, dental soft  Communication strategies:  none    History:     HPI: 76 yo female w/ pmhx of CAD w/ pacemaker , CHF, chronic Afib (hx of sometimes with RVR), hx of ESBL UTI's presents from [facility] with left sided pleuritic chest pain that is also reproducible with palpation.  She is on home O2 at 2 Liters at the nursing home already.  She had a recent hospitalization (4/19/18- 4/26/18) at [facility] for pneumonia & ESBL UTI.  She was discharged to the nursing home due to her weakness.      Pt had only recently moved to [place] from [place] where she was having recurrent falls.  Anticoagulation was discontinued by her PCP in [place].     In ED CTA revealed sifnificant bilat pulmonary embolism with evidence of right heart strain.  Cardiology was called and heparin drip was recommended as well as observation in the ICU.  Also Infectious Disease was consulted because of the hx of ESBL urinary tract infections.  Pt continued to have pleuritic chest pain and aching hip. Continued to sat well on nasal cannula. Pt received a total of 2 L bolus of IVF in the ED, as pt does meet Septic criteria w/ tachycardia, WBC and source.  She continues to be afebrile, however.     Past Medical History:   Diagnosis Date    Anticoagulant long-term use     Atrial fibrillation     Coronary artery  "disease     Hypertension     Renal disorder     Thyroid disease      Past Surgical History:   Procedure Laterality Date    APPENDECTOMY      CARDIAC SURGERY      HYSTERECTOMY      JOINT REPLACEMENT         Social History: Patient lives at NH.    Prior diet: regular solids/thin liquids with dentures.    Subjective     Pt reportedly presenting with poor PO intake. No swallowing difficulties per RN.    Pain/Comfort:  · Pain Rating 1: 0/10    Objective:     Oral Musculature Evaluation  · Oral Musculature: WFL  · Dentition: uses dentures to eat, edentulous  · Secretion Management: adequate  · Mucosal Quality: good  · Mandibular Strength and Mobility: WFL  · Oral Labial Strength and Mobility: WFL  · Lingual Strength and Mobility: WFL  · Buccal Strength and Mobility: WFL  · Volitional Cough: WFL  · Volitional Swallow: WFL  · Voice Prior to PO Intake: strong, clear, dry    Bedside Swallow Eval:   Consistencies Assessed:  · Thin liquids (x7 straw sips)  · Puree (x3 tsp bites)  · Soft solids (x5 bites)     Oral Phase:   · WFL  · Spitting out of food/liquid    Pharyngeal Phase:   · no overt clinical signs/symptoms of aspiration  · no overt clinical signs/symptoms of pharyngeal dysphagia      Treatment: SLP provided skilled education to pt re: rationale for BSE, role of SLP in POC, swallow study results, diet recs, importance of meeting nutritional needs, and swallow precautions. Pt verbalized understanding and agreement of all information provided. Discussed swallow study results/recommendations with pt's RN.    Assessment:     Elena Patton is a 77 y.o. female presents with no overt s/s of aspiration or airway threat noted with any consistencies. Pt missing dentures--chewing up bites of soft solids and spitting them out 2/2 complaints of distaste. Pt stating, "I'm just not hungry." RECS: Dental soft/thin liquids PO diet, calorie count, supplements on all trays, and palliative care consultation to clarify goals with " family and to educate family on illness trajectory. ST will f/u to ensure safety with diet.    Goals:    SLP Goals        Problem: SLP Goal    Goal Priority Disciplines Outcome   SLP Goal     SLP Ongoing (interventions implemented as appropriate)   Description:  SLP Short Term Goals:  1. Patient will tolerate dental soft diet w/ thin liquids with no overt s/s of aspiration.                     Plan:     · Patient to be seen:  2 x/week   · Plan of Care expires:  06/03/18  · Plan of Care reviewed with:  patient, other (see comments) (CASSIDY Nath)   · SLP Follow-Up:  Yes       Discharge recommendations:  nursing facility, skilled     Time Tracking:     SLP Treatment Date:   05/05/18  Speech Start Time:  1640  Speech Stop Time:  1700     Speech Total Time (min):  20 min    Billable Minutes: Eval Swallow and Oral Function 10 minutes and Seld Care/Home Management Training 10 minutes    Kesha Ramon CCC-SLP  05/05/2018

## 2018-05-05 NOTE — PLAN OF CARE
"Problem: SLP Goal  Goal: SLP Goal  SLP Short Term Goals:  1. Patient will tolerate dental soft diet w/ thin liquids with no overt s/s of aspiration.   Outcome: Ongoing (interventions implemented as appropriate)  5/5/2018: Swallow study completed this PM. No overt s/s of aspiration or airway threat noted with any consistencies. Pt missing dentures--chewing up bites of soft solids and spitting them out 2/2 complaints of distaste. Pt stating, "I'm just not hungry." RECS: Dental soft/thin liquids PO diet, calorie count, supplements on all trays, and palliative care consultation to clarify goals with family and to educate family on illness trajectory. ST will f/u to ensure safety with diet.  KAMAR Rothman. CCC-SLP  Speech-Language Pathologist      "

## 2018-05-05 NOTE — SUBJECTIVE & OBJECTIVE
Review of Systems   Constitution: Positive for weakness and malaise/fatigue.   HENT: Negative for congestion.    Eyes: Negative for blurred vision.   Cardiovascular: Positive for dyspnea on exertion and palpitations. Negative for chest pain, claudication, cyanosis, irregular heartbeat, leg swelling, near-syncope, orthopnea, paroxysmal nocturnal dyspnea and syncope.   Respiratory: Negative for shortness of breath.    Endocrine: Negative for polyuria.   Hematologic/Lymphatic: Negative for bleeding problem.   Skin: Negative for itching and rash.   Musculoskeletal: Positive for back pain and muscle weakness. Negative for joint swelling and muscle cramps.   Gastrointestinal: Negative for abdominal pain, hematemesis, hematochezia, melena, nausea and vomiting.   Genitourinary: Negative for dysuria and hematuria.   Neurological: Negative for dizziness, focal weakness, headaches, light-headedness and loss of balance.   Psychiatric/Behavioral: Negative for depression. The patient is not nervous/anxious.      Objective:     Vital Signs (Most Recent):  Temp: 97 °F (36.1 °C) (05/05/18 1110)  Pulse: 106 (05/05/18 1315)  Resp: 17 (05/05/18 1315)  BP: (!) 76/53 (05/05/18 1315)  SpO2: 96 % (05/05/18 1315) Vital Signs (24h Range):  Temp:  [96.4 °F (35.8 °C)-98 °F (36.7 °C)] 97 °F (36.1 °C)  Pulse:  [] 106  Resp:  [11-35] 17  SpO2:  [93 %-97 %] 96 %  BP: ()/(48-97) 76/53     Weight: 52.5 kg (115 lb 11.9 oz)  Body mass index is 20.5 kg/m².     SpO2: 96 %  O2 Device (Oxygen Therapy): nasal cannula      Intake/Output Summary (Last 24 hours) at 05/05/18 1452  Last data filed at 05/05/18 1400   Gross per 24 hour   Intake          2865.19 ml   Output             1450 ml   Net          1415.19 ml       Lines/Drains/Airways     Drain                 Urethral Catheter 05/04/18 0403 14 Fr. 1 day          Peripheral Intravenous Line                 Peripheral IV - Single Lumen 05/03/18 2051 Left Forearm 1 day         Peripheral  IV - Single Lumen 05/03/18 2052 Right Wrist 1 day                Physical Exam   Constitutional: She is oriented to person, place, and time. She appears well-developed and well-nourished.   HENT:   Head: Normocephalic and atraumatic.   Neck: Neck supple. No JVD present.   Cardiovascular: Normal rate and normal heart sounds.  An irregularly irregular rhythm present.   Pulses:       Carotid pulses are 2+ on the right side, and 2+ on the left side.       Radial pulses are 2+ on the right side, and 2+ on the left side.        Femoral pulses are 2+ on the right side, and 2+ on the left side.       Posterior tibial pulses are 1+ on the right side, and 1+ on the left side.   Pulmonary/Chest: Effort normal and breath sounds normal.   Abdominal: Soft. Bowel sounds are normal.   Musculoskeletal: She exhibits no edema.   Neurological: She is alert and oriented to person, place, and time.   Skin: Skin is warm and dry.   Psychiatric: She has a normal mood and affect. Her behavior is normal. Thought content normal.       Significant Labs:   BMP:   Recent Labs  Lab 05/03/18 2114 05/04/18  0401   * 123*   * 129*   K 5.1 4.3   CL 90* 95   CO2 26 27   BUN 23 18   CREATININE 0.8 0.8   CALCIUM 8.5* 8.0*   MG  --  1.8   , CMP   Recent Labs  Lab 05/03/18 2114 05/04/18  0401   * 129*   K 5.1 4.3   CL 90* 95   CO2 26 27   * 123*   BUN 23 18   CREATININE 0.8 0.8   CALCIUM 8.5* 8.0*   PROT 6.0 5.4*   ALBUMIN 2.3* 1.8*   BILITOT 0.8 0.7   ALKPHOS 148* 115   AST 69* 36   ALT 30 21   ANIONGAP 13 7*   ESTGFRAFRICA >60 >60   EGFRNONAA >60 >60   , CBC   Recent Labs  Lab 05/03/18 2114 05/04/18  0401 05/05/18  0008   WBC 21.15* 16.37* 12.18   HGB 12.7 11.5* 10.8*   HCT 40.6 36.4* 33.6*    273 242   , INR   Recent Labs  Lab 05/04/18  0058   INR 1.1   , Lipid Panel No results for input(s): CHOL, HDL, LDLCALC, TRIG, CHOLHDL in the last 48 hours. and Troponin   Recent Labs  Lab 05/03/18  2114   TROPONINI 0.010

## 2018-05-05 NOTE — PT/OT/SLP EVAL
Occupational Therapy   Evaluation    Name: Elena Patton  MRN: [id number]  Admitting Diagnosis:  Bilateral pulmonary embolism      Recommendations:     Discharge Recommendations: nursing facility, skilled  Discharge Equipment Recommendations:  bedside commode  Barriers to discharge:  Inaccessible home environment, Decreased caregiver support    History:     Occupational Profile:  Living Environment: Questionable historian; pt with multiple answers to questions this date; oriented to self and that she is in the hospital; pt reports she lives alone, however, then states she was at her sons, then also a SNF. Per chart, pt admitted form SNF  Previous level of function: unknown; endorses falls; per chart, was mod I with RW and shower   Equipment Owned:  walker, rolling, shower chair  Assistance upon Discharge: unknown    Past Medical History:   Diagnosis Date    Anticoagulant long-term use     Atrial fibrillation     Coronary artery disease     Hypertension     Renal disorder     Thyroid disease        Past Surgical History:   Procedure Laterality Date    APPENDECTOMY      CARDIAC SURGERY      HYSTERECTOMY      JOINT REPLACEMENT         Subjective     Chief Complaint: dizziness  Patient/Family stated goals: none stated   Communicated with: nsg  prior to session.  Pain/Comfort:  · Pain Rating 1: 0/10  · Pain Addressed 1: Pre-medicate for activity  · Pain Rating Post-Intervention 1: 0/10    Patients cultural, spiritual, Hinduism conflicts given the current situation:      Objective:     Patient found with:      General Precautions: Standard, fall   Orthopedic Precautions:N/A   Braces: N/A     Occupational Performance:    Bed Mobility:    · Patient completed Rolling/Turning to Left with  minimum assistance  · Patient completed Rolling/Turning to Right with minimum assistance  · Patient completed Scooting/Bridging with maximal assistance  · Patient completed Supine to Sit with maximal assistance  · Patient completed  Sit to Supine with maximal assistance    Functional Mobility/Transfers:  · Unable to perform    Activities of Daily Living:  · LB Dressing: total assistance    · Toileting: total assistance      Cognitive/Visual Perceptual:  Cognitive/Psychosocial Skills:     -       Oriented to: Person   -       Follows Commands/attention:Easily distracted and Follows one-step commands  -       Communication: clear/fluent  -       Memory: Impaired STM, Impaired LTM and Poor immediate recall  -       Safety awareness/insight to disability: impaired   -       Mood/Affect/Coping skills/emotional control: Appropriate to situation    Physical Exam:  Balance:    -       Max A sitting balance EOB 2/2 reports of dizziness upon sitting; unable to tolerate longer than 1-2 min   Postural examination/scapula alignment:    -       Rounded shoulders  -       Forward head  Skin integrity: Bruising of L forearm   Dominant hand:    -       unknown   Upper Extremity Range of Motion:   Unable to assess 2/2 impaired cognition/lethargy   Upper Extremity Strength:  Unable to assess 2/2 impaired cognition/lethargy    Strength: {fair based on function    Patient left right sidelying with all lines intact, call button in reach, bed alarm on and nsg notified    [facility] 6 Click:  [facility] Total Score: 12    Treatment & Education:  Limited tolerance for axs this date 2/2 dizziness; nursing notified and present   Education:    Assessment:     Elena Patton is a 77 y.o. female with a medical diagnosis of Bilateral pulmonary embolism.  She presents with deconditioning.  Performance deficits affecting function are weakness, impaired balance, impaired endurance, impaired self care skills, impaired functional mobilty, impaired cardiopulmonary response to activity, impaired skin, decreased coordination, decreased safety awareness, impaired cognition, decreased lower extremity function, gait instability, decreased upper extremity function.  Pt would benefit from cont  "OT services in order to maximize functional independence. Recommending return to SNf at d/c; pt with limited tolerance for OOB axs this date 2/2 complaints of dizziness when seated EOB and requiring return to supine     Rehab Prognosis:  Good ; patient would benefit from acute skilled OT services to address these deficits and reach maximum level of function.         Clinical Decision Makin.  OT Low:  "Pt evaluation falls under low complexity for evaluation coding due to performance deficits noted in 1-3 areas as stated above and 0 co-morbities affecting current functional status. Data obtained from problem focused assessments. No modifications or assistance was required for completion of evaluation. Only brief occupational profile and history review completed."     Plan:     Patient to be seen 5 x/week to address the above listed problems via self-care/home management, therapeutic activities, therapeutic exercises  · Plan of Care Expires: 18  · Plan of Care Reviewed with: patient    This Plan of care has been discussed with the patient who was involved in its development and understands and is in agreement with the identified goals and treatment plan    GOALS:    Occupational Therapy Goals        Problem: Occupational Therapy Goal    Goal Priority Disciplines Outcome Interventions   Occupational Therapy Goal     OT, PT/OT Ongoing (interventions implemented as appropriate)    Description:  Goals to be met by: 18     Patient will increase functional independence with ADLs by performing:    LE Dressing with Minimal Assistance.  Grooming while standing with Contact Guard Assistance.  Toileting from bedside commode with Contact Guard Assistance for hygiene and clothing management.   Supine to sit with Contact Guard Assistance.  Stand pivot transfers with Contact Guard Assistance.  Toilet transfer to bedside commode with Contact Guard Assistance.  Increased functional strength to WFL for self care skills " and functional mobility.  Upper extremity exercise program x10 reps per handout, with independence.                      Time Tracking:     OT Date of Treatment: 05/05/18  OT Start Time: 1129  OT Stop Time: 1144  OT Total Time (min): 15 min    Billable Minutes:Evaluation 15    Lucy Sanchez, OT  5/5/2018

## 2018-05-05 NOTE — ASSESSMENT & PLAN NOTE
RVR 2/2 extensive PEs  Continue home Amio and Cardizem  Not on OAC 2/2 falls, will require OAC initiation upon discharge for PE treatment - likely Eliquis  -Heparin gtt for now in case any procedures need to be done (i.e. Thoracentesis)

## 2018-05-05 NOTE — PLAN OF CARE
Heart rate 114 at fib, bp 112 sys, toprol given, neck dressing dry and intact, no evidence hematoma, bruising but no inc

## 2018-05-06 PROBLEM — J90 PLEURAL EFFUSION: Status: ACTIVE | Noted: 2018-01-01

## 2018-05-06 NOTE — PROGRESS NOTES
[facility]  Cardiology  Progress Note    Patient Name: Elena Patton  MRN: [id number]  Admission Date: 5/3/2018  Hospital Length of Stay: 2 days  Code Status: Full Code   Attending Physician: Riky Santos III, MD   Primary Care Physician: Gael Chao MD  Expected Discharge Date:   Principal Problem:Bilateral pulmonary embolism    Subjective:     Hospital Course:   05/04/2018 Per HPI.     5/5/2018:  Overnight no acute events. S/p EKOS catheter directed thrombolysis with improvement in PASP 50-30. Feels better. Hemodynamically stable. On heparin gtt currently.     5/6/20189:  Overnight no acute events. S/p thoracentesis this AM and hypotensive. Breathing fine.      Review of Systems   Constitution: Positive for weakness and malaise/fatigue.   HENT: Negative for congestion.    Eyes: Negative for blurred vision.   Cardiovascular: Positive for dyspnea on exertion. Negative for chest pain, claudication, cyanosis, irregular heartbeat, leg swelling, near-syncope, orthopnea, palpitations, paroxysmal nocturnal dyspnea and syncope.   Respiratory: Positive for shortness of breath.    Endocrine: Negative for polyuria.   Hematologic/Lymphatic: Negative for bleeding problem.   Skin: Negative for itching and rash.   Musculoskeletal: Positive for muscle weakness. Negative for joint swelling and muscle cramps.   Gastrointestinal: Negative for abdominal pain, hematemesis, hematochezia, melena, nausea and vomiting.   Genitourinary: Negative for dysuria and hematuria.   Neurological: Positive for dizziness and light-headedness. Negative for focal weakness, headaches and loss of balance.   Psychiatric/Behavioral: Negative for depression. The patient is not nervous/anxious.      Objective:     Vital Signs (Most Recent):  Temp: 97.7 °F (36.5 °C) (05/06/18 1130)  Pulse: 109 (05/06/18 1030)  Resp: (!) 24 (05/06/18 1030)  BP: 132/75 (05/06/18 1030)  SpO2: (!) 93 % (05/06/18 1030) Vital Signs (24h Range):  Temp:  [96.8 °F  (36 °C)-97.7 °F (36.5 °C)] 97.7 °F (36.5 °C)  Pulse:  [] 109  Resp:  [14-27] 24  SpO2:  [93 %-97 %] 93 %  BP: ()/(51-97) 132/75     Weight: 54 kg (119 lb 0.8 oz)  Body mass index is 21.09 kg/m².     SpO2: (!) 93 %  O2 Device (Oxygen Therapy): nasal cannula      Intake/Output Summary (Last 24 hours) at 05/06/18 1242  Last data filed at 05/06/18 1230   Gross per 24 hour   Intake          1841.52 ml   Output             1710 ml   Net           131.52 ml       Lines/Drains/Airways     Drain                 Urethral Catheter 05/04/18 0403 14 Fr. 2 days          Peripheral Intravenous Line                 Peripheral IV - Single Lumen 05/03/18 2051 Left Forearm 2 days         Peripheral IV - Single Lumen 05/03/18 2052 Right Wrist 2 days                Physical Exam   Constitutional: She is oriented to person, place, and time. She appears well-developed and well-nourished.   HENT:   Head: Normocephalic and atraumatic.   Neck: Neck supple. No JVD present.   Cardiovascular: Normal heart sounds.  An irregularly irregular rhythm present. Tachycardia present.    Pulses:       Carotid pulses are 2+ on the right side, and 2+ on the left side.       Radial pulses are 2+ on the right side, and 2+ on the left side.        Femoral pulses are 2+ on the right side, and 2+ on the left side.       Posterior tibial pulses are 1+ on the right side, and 1+ on the left side.   Pulmonary/Chest: Effort normal and breath sounds normal.   Abdominal: Soft. Bowel sounds are normal.   Musculoskeletal: She exhibits no edema.   Neurological: She is alert and oriented to person, place, and time.   Skin: Skin is warm and dry.   Psychiatric: She has a normal mood and affect. Her behavior is normal. Thought content normal.       Significant Labs:   BMP:   Recent Labs  Lab 05/06/18  0542   GLU 93   *   K 4.1      CO2 23   BUN 13   CREATININE 0.7   CALCIUM 8.6*   , CMP   Recent Labs  Lab 05/06/18  0542   *   K 4.1       CO2 23   GLU 93   BUN 13   CREATININE 0.7   CALCIUM 8.6*   PROT 5.6*   ALBUMIN 1.8*   BILITOT 0.5   ALKPHOS 106   AST 16   ALT 14   ANIONGAP 9   ESTGFRAFRICA >60   EGFRNONAA >60   , CBC   Recent Labs  Lab 05/05/18  0008 05/06/18  0542   WBC 12.18 12.14   HGB 10.8* 12.1   HCT 33.6* 38.1    351*   , INR No results for input(s): INR, PROTIME in the last 48 hours., Lipid Panel No results for input(s): CHOL, HDL, LDLCALC, TRIG, CHOLHDL in the last 48 hours. and Troponin No results for input(s): TROPONINI in the last 48 hours.        Assessment and Plan:         * Bilateral pulmonary embolism    CTA chest with extensive filling defects throughout the pulmonary tree involving the segmental vessels to the right upper, right lower, left upper and left lower lobes.  Bowing of the interventricular septum to the left with dilatation of the right-sided chambers noted. Evidence of LLL infarction noted.        Continue Heparin gtt    S/p EKOS and improvement in PASP and symptoms.    Will require OAC prior to discharge         Chronic combined systolic and diastolic heart failure      Euvolemic on exam   Currently on BB        Essential hypertension      Hold antihypertensives         Atrial fibrillation with RVR    RVR 2/2 extensive PEs  Continue home Amio and Cardizem  Not on OAC 2/2 falls, will require OAC initiation upon discharge for PE treatment - likely Eliquis  -Heparin gtt after thoracentesis and then NOAC likely tomorrow            VTE Risk Mitigation         Ordered     heparin 25,000 units in dextrose 5% 250 mL (100 units/mL) infusion; FEMALE  Continuous      05/04/18 0334     heparin 25,000 units in dextrose 5% 250 mL (100 units/mL) bolus from bag; PRN BOLUS  As needed (PRN)      05/04/18 0334     heparin 25,000 units in dextrose 5% 250 mL (100 units/mL) bolus from bag; PRN BOLUS  As needed (PRN)      05/04/18 0334     IP VTE HIGH RISK PATIENT  Once      05/04/18 0322          Ha Kam,  MD  Cardiology  [facility]

## 2018-05-06 NOTE — EICU
eICU called into room in anticipation of Right Thoracentesis. Time out performed. Dr Herbert and Josiah at bedside. Procedure performed without incident. Pt tolerated well. Approximate 1900 ml of fluid removed.

## 2018-05-06 NOTE — PROCEDURES
"Elena Patton is a 77 y.o. female patient.    Temp: 97.4 °F (36.3 °C) (18 0730)  Pulse: 109 (18 1030)  Resp: (!) 24 (18 1030)  BP: 132/75 (18 1030)  SpO2: (!) 93 % (18 1030)  Weight: 54 kg (119 lb 0.8 oz) (18 0505)  Height: 5' 3" (160 cm) (18 1300)  Mallampati Scale: Class II  ASA Classification: Class 2    Thoracentesis  Date/Time: 2018 11:33 AM  Performed by: SIMBA FRIAS  Authorized by: SIMBA FRIAS   Consent Done: Yes  Consent: Verbal consent obtained. Written consent obtained.  Risks and benefits: risks, benefits and alternatives were discussed  Consent given by: power of   Patient understanding: patient states understanding of the procedure being performed  Patient consent: the patient's understanding of the procedure matches consent given  Procedure consent: procedure consent matches procedure scheduled  Relevant documents: relevant documents present and verified  Test results: test results available and properly labeled  Site marked: the operative site was marked  Imaging studies: imaging studies available  Required items: required blood products, implants, devices, and special equipment available  Patient identity confirmed: , MRN and name  Time out: Immediately prior to procedure a "time out" was called to verify the correct patient, procedure, equipment, support staff and site/side marked as required.  Procedure purpose: diagnostic and therapeutic  Indications: pleural effusion  Preparation: Patient was prepped and draped in the usual sterile fashion.  Local anesthesia used: yes  Anesthesia: local infiltration    Anesthesia:  Local anesthesia used: yes  Local Anesthetic: lidocaine 1% with epinephrine  Anesthetic total: 8 mL  Patient sedated: no  Preparation: skin prepped with ChloraPrep  Patient position: sitting  Ultrasound guidance: yes  Location: right posterior  Intercostal space: 7th  Puncture method: over-the-needle " catheter  Needle size: 16  Catheter size: 16 gauge  Number of attempts: 1  Drainage amount: 2000 ml  Drainage characteristics: serous  Patient tolerance: Patient tolerated the procedure well with no immediate complications  Chest x-ray performed: yes  Complications: No  Estimated blood loss (mL): 0  Specimens: Yes  Implants: No          Tino Herbert  5/6/2018     I was present for and supervised this procedure.    Reilly Rahman MD  [facility]

## 2018-05-06 NOTE — PLAN OF CARE
Problem: Patient Care Overview  Goal: Plan of Care Review  Plan of care reviewed with patient; questions answered by ICU RN.

## 2018-05-06 NOTE — NURSING
"Pt states, "Its hard for me to breath. I feel like every breath is going to be my last." Dr. Abdirizak Rosas on bedside.   "

## 2018-05-06 NOTE — ASSESSMENT & PLAN NOTE
RVR 2/2 extensive PEs  Continue home Amio and Cardizem  Not on OAC 2/2 falls, will require OAC initiation upon discharge for PE treatment - likely Eliquis  -Heparin gtt after thoracentesis and then NOAC likely tomorrow

## 2018-05-06 NOTE — SUBJECTIVE & OBJECTIVE
Past Medical History:   Diagnosis Date    Anticoagulant long-term use     Atrial fibrillation     Coronary artery disease     Hypertension     Renal disorder     Thyroid disease        Past Surgical History:   Procedure Laterality Date    APPENDECTOMY      CARDIAC SURGERY      HYSTERECTOMY      JOINT REPLACEMENT         Review of patient's allergies indicates:  No Known Allergies    Family History     None        Social History Main Topics    Smoking status: Never Smoker    Smokeless tobacco: Never Used    Alcohol use No    Drug use: No    Sexual activity: Not on file         Review of Systems   Constitutional: Negative.    HENT: Negative.    Eyes: Negative.    Respiratory: Positive for chest tightness.    Cardiovascular: Negative.    Gastrointestinal: Negative.    Endocrine: Negative.    Genitourinary: Negative.    Musculoskeletal: Positive for back pain.   Skin: Negative.    Allergic/Immunologic: Negative.    Neurological: Negative.    Hematological: Negative.    Psychiatric/Behavioral: Negative.      Objective:     Vital Signs (Most Recent):  Temp: 97.4 °F (36.3 °C) (05/06/18 0730)  Pulse: (!) 118 (05/06/18 0716)  Resp: (!) 26 (05/06/18 0716)  BP: 108/70 (05/06/18 0600)  SpO2: 97 % (05/06/18 0716) Vital Signs (24h Range):  Temp:  [96.8 °F (36 °C)-97.5 °F (36.4 °C)] 97.4 °F (36.3 °C)  Pulse:  [] 118  Resp:  [14-27] 26  SpO2:  [93 %-97 %] 97 %  BP: ()/(48-94) 108/70     Weight: 54 kg (119 lb 0.8 oz)  Body mass index is 21.09 kg/m².      Intake/Output Summary (Last 24 hours) at 05/06/18 0859  Last data filed at 05/06/18 0600   Gross per 24 hour   Intake           1409.8 ml   Output              820 ml   Net            589.8 ml       Physical Exam   Constitutional: She is oriented to person, place, and time. She appears well-developed and well-nourished.   HENT:   Head: Normocephalic and atraumatic.   Cardiovascular: Normal rate and regular rhythm.    Pulmonary/Chest:   Decreased breath  sounds on right   Abdominal: Soft. Bowel sounds are normal.   Neurological: She is alert and oriented to person, place, and time.   Skin: Skin is warm and dry.   Psychiatric: She has a normal mood and affect.   Nursing note and vitals reviewed.      Vents:       Lines/Drains/Airways     Drain                 Urethral Catheter 05/04/18 0403 14 Fr. 2 days          Peripheral Intravenous Line                 Peripheral IV - Single Lumen 05/03/18 2051 Left Forearm 2 days         Peripheral IV - Single Lumen 05/03/18 2052 Right Wrist 2 days                Significant Labs:    CBC/Anemia Profile:    Recent Labs  Lab 05/05/18  0008 05/06/18  0542   WBC 12.18 12.14   HGB 10.8* 12.1   HCT 33.6* 38.1    351*   MCV 95 94   RDW 18.1* 18.2*        Chemistries:    Recent Labs  Lab 05/06/18  0542   *   K 4.1      CO2 23   BUN 13   CREATININE 0.7   CALCIUM 8.6*   ALBUMIN 1.8*   PROT 5.6*   BILITOT 0.5   ALKPHOS 106   ALT 14   AST 16       All pertinent labs within the past 24 hours have been reviewed.    Significant Imaging:   I have reviewed all pertinent imaging results/findings within the past 24 hours.

## 2018-05-06 NOTE — SUBJECTIVE & OBJECTIVE
Review of Systems   Constitution: Positive for weakness and malaise/fatigue.   HENT: Negative for congestion.    Eyes: Negative for blurred vision.   Cardiovascular: Positive for dyspnea on exertion. Negative for chest pain, claudication, cyanosis, irregular heartbeat, leg swelling, near-syncope, orthopnea, palpitations, paroxysmal nocturnal dyspnea and syncope.   Respiratory: Positive for shortness of breath.    Endocrine: Negative for polyuria.   Hematologic/Lymphatic: Negative for bleeding problem.   Skin: Negative for itching and rash.   Musculoskeletal: Positive for muscle weakness. Negative for joint swelling and muscle cramps.   Gastrointestinal: Negative for abdominal pain, hematemesis, hematochezia, melena, nausea and vomiting.   Genitourinary: Negative for dysuria and hematuria.   Neurological: Positive for dizziness and light-headedness. Negative for focal weakness, headaches and loss of balance.   Psychiatric/Behavioral: Negative for depression. The patient is not nervous/anxious.      Objective:     Vital Signs (Most Recent):  Temp: 97.7 °F (36.5 °C) (05/06/18 1130)  Pulse: 109 (05/06/18 1030)  Resp: (!) 24 (05/06/18 1030)  BP: 132/75 (05/06/18 1030)  SpO2: (!) 93 % (05/06/18 1030) Vital Signs (24h Range):  Temp:  [96.8 °F (36 °C)-97.7 °F (36.5 °C)] 97.7 °F (36.5 °C)  Pulse:  [] 109  Resp:  [14-27] 24  SpO2:  [93 %-97 %] 93 %  BP: ()/(51-97) 132/75     Weight: 54 kg (119 lb 0.8 oz)  Body mass index is 21.09 kg/m².     SpO2: (!) 93 %  O2 Device (Oxygen Therapy): nasal cannula      Intake/Output Summary (Last 24 hours) at 05/06/18 1242  Last data filed at 05/06/18 1230   Gross per 24 hour   Intake          1841.52 ml   Output             1710 ml   Net           131.52 ml       Lines/Drains/Airways     Drain                 Urethral Catheter 05/04/18 0403 14 Fr. 2 days          Peripheral Intravenous Line                 Peripheral IV - Single Lumen 05/03/18 2051 Left Forearm 2 days          Peripheral IV - Single Lumen 05/03/18 2052 Right Wrist 2 days                Physical Exam   Constitutional: She is oriented to person, place, and time. She appears well-developed and well-nourished.   HENT:   Head: Normocephalic and atraumatic.   Neck: Neck supple. No JVD present.   Cardiovascular: Normal heart sounds.  An irregularly irregular rhythm present. Tachycardia present.    Pulses:       Carotid pulses are 2+ on the right side, and 2+ on the left side.       Radial pulses are 2+ on the right side, and 2+ on the left side.        Femoral pulses are 2+ on the right side, and 2+ on the left side.       Posterior tibial pulses are 1+ on the right side, and 1+ on the left side.   Pulmonary/Chest: Effort normal and breath sounds normal.   Abdominal: Soft. Bowel sounds are normal.   Musculoskeletal: She exhibits no edema.   Neurological: She is alert and oriented to person, place, and time.   Skin: Skin is warm and dry.   Psychiatric: She has a normal mood and affect. Her behavior is normal. Thought content normal.       Significant Labs:   BMP:   Recent Labs  Lab 05/06/18  0542   GLU 93   *   K 4.1      CO2 23   BUN 13   CREATININE 0.7   CALCIUM 8.6*   , CMP   Recent Labs  Lab 05/06/18  0542   *   K 4.1      CO2 23   GLU 93   BUN 13   CREATININE 0.7   CALCIUM 8.6*   PROT 5.6*   ALBUMIN 1.8*   BILITOT 0.5   ALKPHOS 106   AST 16   ALT 14   ANIONGAP 9   ESTGFRAFRICA >60   EGFRNONAA >60   , CBC   Recent Labs  Lab 05/05/18  0008 05/06/18  0542   WBC 12.18 12.14   HGB 10.8* 12.1   HCT 33.6* 38.1    351*   , INR No results for input(s): INR, PROTIME in the last 48 hours., Lipid Panel No results for input(s): CHOL, HDL, LDLCALC, TRIG, CHOLHDL in the last 48 hours. and Troponin No results for input(s): TROPONINI in the last 48 hours.

## 2018-05-06 NOTE — CONSULTS
[facility]  Pulmonology  Consult Note    Patient Name: Elena Patton  MRN: [id number]  Admission Date: 5/3/2018  Hospital Length of Stay: 2 days  Code Status: Full Code  Attending Physician: Riky Santos III, MD  Primary Care Provider: Gael Caho MD   Principal Problem: Bilateral pulmonary embolism    Inpatient consult to Pulmonology  Consult performed by: SIMBA FRIAS  Consult ordered by: MICKEY GONZÁLES        Subjective:     HPI:  Ms. Patton is a 78yo female with a PMHx of AF, CAD, HTN, s/p pacemaker admitted from the rehab center with chest pain. CTA in ED showed bilateral PE with compressive right-sided pleural effusion. Pt was admitted to ICU on 5/3/18 with cardiology on consult. On 5/4/18, EKOS catheter placed with catheter-directed tPA x6hrs. EF 10% by TTE. Pulmonary consulted for thoracentesis.    Past Medical History:   Diagnosis Date    Anticoagulant long-term use     Atrial fibrillation     Coronary artery disease     Hypertension     Renal disorder     Thyroid disease        Past Surgical History:   Procedure Laterality Date    APPENDECTOMY      CARDIAC SURGERY      HYSTERECTOMY      JOINT REPLACEMENT         Review of patient's allergies indicates:  No Known Allergies    Family History     None        Social History Main Topics    Smoking status: Never Smoker    Smokeless tobacco: Never Used    Alcohol use No    Drug use: No    Sexual activity: Not on file         Review of Systems   Constitutional: Negative.    HENT: Negative.    Eyes: Negative.    Respiratory: Positive for chest tightness.    Cardiovascular: Negative.    Gastrointestinal: Negative.    Endocrine: Negative.    Genitourinary: Negative.    Musculoskeletal: Positive for back pain.   Skin: Negative.    Allergic/Immunologic: Negative.    Neurological: Negative.    Hematological: Negative.    Psychiatric/Behavioral: Negative.      Objective:     Vital Signs (Most Recent):  Temp: 97.4 °F (36.3  °C) (05/06/18 0730)  Pulse: (!) 118 (05/06/18 0716)  Resp: (!) 26 (05/06/18 0716)  BP: 108/70 (05/06/18 0600)  SpO2: 97 % (05/06/18 0716) Vital Signs (24h Range):  Temp:  [96.8 °F (36 °C)-97.5 °F (36.4 °C)] 97.4 °F (36.3 °C)  Pulse:  [] 118  Resp:  [14-27] 26  SpO2:  [93 %-97 %] 97 %  BP: ()/(48-94) 108/70     Weight: 54 kg (119 lb 0.8 oz)  Body mass index is 21.09 kg/m².      Intake/Output Summary (Last 24 hours) at 05/06/18 0859  Last data filed at 05/06/18 0600   Gross per 24 hour   Intake           1409.8 ml   Output              820 ml   Net            589.8 ml       Physical Exam   Constitutional: She is oriented to person, place, and time. She appears well-developed and well-nourished.   HENT:   Head: Normocephalic and atraumatic.   Cardiovascular: Normal rate and regular rhythm.    Pulmonary/Chest:   Decreased breath sounds on right   Abdominal: Soft. Bowel sounds are normal.   Neurological: She is alert and oriented to person, place, and time.   Skin: Skin is warm and dry.   Psychiatric: She has a normal mood and affect.   Nursing note and vitals reviewed.      Vents:       Lines/Drains/Airways     Drain                 Urethral Catheter 05/04/18 0403 14 Fr. 2 days          Peripheral Intravenous Line                 Peripheral IV - Single Lumen 05/03/18 2051 Left Forearm 2 days         Peripheral IV - Single Lumen 05/03/18 2052 Right Wrist 2 days                Significant Labs:    CBC/Anemia Profile:    Recent Labs  Lab 05/05/18  0008 05/06/18  0542   WBC 12.18 12.14   HGB 10.8* 12.1   HCT 33.6* 38.1    351*   MCV 95 94   RDW 18.1* 18.2*        Chemistries:    Recent Labs  Lab 05/06/18  0542   *   K 4.1      CO2 23   BUN 13   CREATININE 0.7   CALCIUM 8.6*   ALBUMIN 1.8*   PROT 5.6*   BILITOT 0.5   ALKPHOS 106   ALT 14   AST 16       All pertinent labs within the past 24 hours have been reviewed.    Significant Imaging:   I have reviewed all pertinent imaging results/findings  within the past 24 hours.    Assessment/Plan:     * Bilateral pulmonary embolism    · Heparin infusion on hold as of 8am for possible thoracentesis  · Her bilateral PE is most likely responsible for her initial hypotension and tachycardia, not pneumonia or sepsis  · Pt has identified DVT                Thank you for your consult. I will follow-up with patient. Please contact us if you have any additional questions.     Tino Herbert MD  Pulmonology  [facility]    Pt seen and examined with Dr. Herbert.   Agree with his evaluation and plan.  76 yo F admitted 5/3 with bilat PE, DVT.  Recent hosp [facility] for pneumonia.  Rt pleural effusion on admit CTA in addition to PE.  Had cath directed clot lysis.  Asked to see re: pleural effusion.  Review of CT shows bilat pleural effusions, R>L, w/o loculation.  Compression atelectasis of RLL.  No obvious pleural or parenchymal masses.      Imp:  Pleural effusions likely secondary to PE and/or LVF.  Doubt empyema.  We have held heparin and will do thoracentesis today to r/o infection.  We will take out as much fluid as we can but will not leave a catheter in place.      Reilly Rahman MD  [facility]

## 2018-05-06 NOTE — PT/OT/SLP PROGRESS
Speech Language Pathology Treatment    Patient Name:  Elena Patton   MRN:  [id number]  Admitting Diagnosis: Bilateral pulmonary embolism    Recommendations:     General Recommendations: Dysphagia therapy; Consult Dietician  Diet recommendations:  Dental Soft (Liquid supplements (i.e. Ensure) with meals), Liquid Diet Level: Thin   Aspiration Precautions: 1 bite/sip at a time, Alternating bites/sips, Assistance with meals, Feed only when awake/alert, HOB to 90 degrees and Small bites/sips   General Precautions: Standard, aspiration, fall      Subjective     Awake with variable alertness. Consistent cues needed to sustain wakefulness.   Patient goals: Pt did not produce any functional/personal goals today.     Pain/Comfort:  · Pain Rating 1: 0/10  · Pain Rating 2: 0/10    Objective:     Has the patient been evaluated by SLP for swallowing?   Yes  Keep patient NPO? No   Current Respiratory Status: room air      COGNITION: Sleeping initially. Opened eyes and provided short, simple responses for <5 second intervals given max, consistent cues. Limited attention. Followed simple directions and answered basic questions <50% of the time today with mod-max assist with dcr attention and alertness negatively impacting communication success. Verbal expression is fluent with dcr initiation and some single words/short phrases produced given max assist for alertness.     SWALLOWING: With max assist and encouragement, pt agreed to 3 sips of water and 1 bite of puree. Refused any additional trials or consistencies. Nursing reports that pt does well taking medications, but consumed 0% of meals today and yesterday. Observed consuming thin liquid via spoon and straw and single bite of puree given max assist. No self-feeding elicited. ORAL PHASE: Lip seal WNL. Slow bolus formation and A-P transit. No oral residue seen. PHARYNGEAL PHASE: Overt s/s of airway penetration and/or aspiration noted after initial spoon sip of water only characterized  by coughing. Eliminated for all subsequent trials with improved alertness, including consecutive straw sips. No additional coughing, choking, throat clearing, or change in vocal quality. No s/s of pharyngeal residue.     Assessment:     Elena Patton is a 77 y.o. female with an SLP diagnosis of Dysphagia.  Pt currently presents with minimal PO intake, including report of 0% of breakfast and lunch consumed today. Recommend continuing current diet with liquid supplements and consult with dietician due to concern for malnutrition/dehydration at this time.    Goals:    SLP Goals        Problem: SLP Goal    Goal Priority Disciplines Outcome   SLP Goal     SLP Ongoing (interventions implemented as appropriate)   Description:  SLP Short Term Goals:  1. Patient will consume >50% dental soft diet with thin liquids with no overt s/s of aspiration given mod-max assist.  2. Patient will implement safe swallowing strategies 100% of the time during meals given mod-max assist.                     Plan:     · Patient to be seen:  3 x/week   · Plan of Care expires:  06/03/18  · Plan of Care reviewed with:  patient, other (see comments) (CASSIDY Nath)   · SLP Follow-Up:  Yes       Discharge recommendations:  nursing facility, skilled       Time Tracking:     SLP Treatment Date:   05/06/18  Speech Start Time:  1241  Speech Stop Time:  1255     Speech Total Time (min):  14 min    Billable Minutes: Treatment Swallowing Dysfunction 14 minutes    Titi Navarro CCC-SLP  05/06/2018

## 2018-05-06 NOTE — ASSESSMENT & PLAN NOTE
· Heparin infusion on hold as of 8am for possible thoracentesis  · Her bilateral PE is most likely responsible for her initial hypotension and tachycardia, not pneumonia or sepsis  · Pt has identified DVT

## 2018-05-06 NOTE — PLAN OF CARE
Problem: SLP Goal  Goal: SLP Goal  SLP Short Term Goals:  1. Patient will consume >50% dental soft diet with thin liquids with no overt s/s of aspiration given mod-max assist.  2. Patient will implement safe swallowing strategies 100% of the time during meals given mod-max assist.   Outcome: Ongoing (interventions implemented as appropriate)  Pt currently presents with minimal PO intake, including report of 0% of breakfast and lunch consumed today. Recommend continuing current diet with liquid supplements and consult with dietician due to concern for malnutrition/dehydration at this time.  Titi Navarro MS, CCC-SLP

## 2018-05-06 NOTE — PROGRESS NOTES
"Vancomycin Dosing and Monitoring Pharmacy Protocol    Elena Patton is a 77 y.o. female    Height: 5' 3" (1.6 m)   Wt Readings from Last 1 Encounters:   05/05/18 52.5 kg (115 lb 11.9 oz)     Ideal body weight: 52.4 kg (115 lb 8.3 oz)  Adjusted ideal body weight: 52.4 kg (115 lb 9.7 oz)    Temp Readings from Last 3 Encounters:   05/05/18 97.4 °F (36.3 °C) (Oral)   04/26/18 96.5 °F (35.8 °C) (Oral)   12/11/17 97.3 °F (36.3 °C) (Oral)      Lab Results   Component Value Date/Time    WBC 12.18 05/05/2018 12:08 AM    WBC 16.37 (H) 05/04/2018 04:01 AM    WBC 21.15 (H) 05/03/2018 09:14 PM      Lab Results   Component Value Date/Time    CREATININE 0.8 05/04/2018 04:01 AM    CREATININE 0.8 05/03/2018 09:14 PM    CREATININE 1.03 04/26/2018 06:07 AM        Serum creatinine: 0.8 mg/dL 05/04/18 0401  Estimated creatinine clearance: 47.9 mL/min    Antibiotics       Start     Stop Route Frequency Ordered    05/05/18 2345  vancomycin 1 g in 0.9% sodium chloride 250 mL IVPB (ready to mix system)      -- IV Every 24 hours (non-standard times) 05/05/18 2325    05/04/18 0700  piperacillin-tazobactam 4.5 g in dextrose 5 % 100 mL IVPB (ready to mix system)      -- IV Every 8 hours (non-standard times) 05/04/18 0338          Antifungals       None            Microbiology Results (last 7 days)       Procedure Component Value Units Date/Time    Urine culture [[id number]] Collected:  05/03/18 2227    Order Status:  Completed Specimen:  Urine from Urine, Catheterized Updated:  05/05/18 1123     Urine Culture, Routine --     PATRICK ALBICANS  50,000 - 99,999 cfu/ml  Treatment of asymptomatic candiduria is not recommended (except for   specific populations). Candida isolated in the urine typically   represents colonization. If an indwelling urinary catheter is present  it should be removed or replaced.      Blood culture x two cultures. Draw prior to antibiotics. [[id number]] Collected:  05/03/18 2254    Order Status:  Completed Specimen:  Blood " from Peripheral, Antecubital, Right Updated:  18 0612     Blood Culture, Routine No Growth to date     Blood Culture, Routine No Growth to date    Narrative:       Aerobic and anaerobic    Blood culture x two cultures. Draw prior to antibiotics. [[id number]] Collected:  18 2240    Order Status:  Completed Specimen:  Blood from Peripheral, Forearm, Right Updated:  18 0612     Blood Culture, Routine No Growth to date     Blood Culture, Routine No Growth to date    Narrative:       Aerobic and anaerobic    Culture, Respiratory with Gram Stain [[id number]]     Order Status:  No result Specimen:  Respiratory             Indication:   Pneumonia    Target Level: 15-20 mcg/ml    Hemodialysis:   No on N/A    Dosing Weight:   ABW--actual body weight  If ABW is greater than or equal to 30% over Ideal Body Weight, AdjBW will be used to calculate vancomycin dose.    Last Vancomycin dose: 750 mg   Date/Time given: 18 at 2315          Vancomycin level:  Recent Labs   Lab Result Units  18   2250   Vancomycin-Trough ug/mL  7.4*     Recent Labs   Lab Result Units  18   2250   Vancomycin-Trough ug/mL  7.4*       Per Protocol Initial/Adjustments Dosin. Initial/Adjustment Dose: INCREASE Vancomycin will be adjusted from 750mg q24hr to 1000mg q24hr  2. Vancomycin Trough Level will be drawn on 18 at 2315 date/time    Pharmacy will continue to follow.    Please contact if you have any further questions. Thank you.    Jerad Gastelum, PharmD  [phone]

## 2018-05-06 NOTE — PROGRESS NOTES
Progress Note  [facility]    Admit Date: 5/3/2018   LOS: 2 days     SUBJECTIVE:     Follow-up For:  Extensive bilateral PE    Patient interviewed and examined this morning. Patient is s/p cath lab thrombolysis which went well. Cardiology following. Rodriguez draining clear urine. Patient given fluids yesterday with improvement in BP as well as urine output. Patient complaining of SOB this morning.       Scheduled Meds:   albuterol-ipratropium 2.5mg-0.5mg/3mL  3 mL Nebulization Q6H    amiodarone  200 mg Oral BID    amitriptyline  10 mg Oral BID    aspirin  325 mg Oral Daily    cetirizine  10 mg Oral Daily    DULoxetine  30 mg Oral Daily    gabapentin  600 mg Oral TID    levothyroxine  25 mcg Oral Before breakfast    lidocaine  1 patch Transdermal Q24H    lubiprostone  24 mcg Oral BID WM    metoprolol succinate  25 mg Oral Daily    pantoprazole 40 mg in dextrose 5 % 100 mL infusion (ready to mix system)  40 mg Intravenous Daily    piperacillin-tazobactam (ZOSYN) IVPB  4.5 g Intravenous Q8H    vancomycin (VANCOCIN) IVPB  1,000 mg Intravenous Q24H     Continuous Infusions:   heparin (porcine) in D5W 18 Units/kg/hr (05/06/18 0600)     PRN Meds:acetaminophen, benzonatate, heparin (PORCINE), heparin (PORCINE), sodium chloride 0.9%    Review of patient's allergies indicates:  No Known Allergies    Review of Systems  Pos: back pain and bilateral foot pain  Neg: CP, SOB    OBJECTIVE:     Vital Signs (Most Recent)  Temp: 97.5 °F (36.4 °C) (05/06/18 0300)  Pulse: (!) 118 (05/06/18 0716)  Resp: (!) 26 (05/06/18 0716)  BP: 108/70 (05/06/18 0600)  SpO2: 97 % (05/06/18 0716)    Vital Signs Range (Last 24H):  Temp:  [96.8 °F (36 °C)-97.5 °F (36.4 °C)]   Pulse:  []   Resp:  [14-27]   BP: ()/(48-95)   SpO2:  [93 %-97 %]     I & O (Last 24H):    Intake/Output Summary (Last 24 hours) at 05/06/18 0737  Last data filed at 05/06/18 0600   Gross per 24 hour   Intake           1518.6 ml   Output               820 ml   Net            698.6 ml     Wt Readings from Last 3 Encounters:   05/06/18 54 kg (119 lb 0.8 oz)   04/26/18 48.8 kg (107 lb 9.4 oz)   12/11/17 49 kg (108 lb)     Physical Exam:  Gen: NAD  HEENT: NC, AT  Chest: decreased breath sounds b/l, worse on R lower lung field, and mild bibasilar rales b/l   CVS: irregular irregular rhythm, no m/r/g  Abdomen: soft, NT, ND, no masses, +BS  Ext: no edema, pulses 2+   Skin: ro rashes  Neuro: A&O x 3, CN's grossly intact, sensation intact to light touch  Psych: Normal mood, affect, thought content    Laboratory:  LABS  CBC    Recent Labs  Lab 05/03/18 2114 05/04/18  0401 05/05/18  0008   WBC 21.15* 16.37* 12.18   RBC 4.35 3.88* 3.54*   HGB 12.7 11.5* 10.8*   HCT 40.6 36.4* 33.6*    273 242   MCV 93 94 95   MCH 29.2 29.6 30.5   MCHC 31.3* 31.6* 32.1     BMP    Recent Labs  Lab 05/03/18 2114 05/04/18  0401   * 129*   K 5.1 4.3   CO2 26 27   CL 90* 95   BUN 23 18   CREATININE 0.8 0.8   * 123*         Recent Labs  Lab 05/03/18 2114 05/04/18  0401   CALCIUM 8.5* 8.0*   MG  --  1.8   PHOS  --  3.4     LFT    Recent Labs  Lab 05/03/18 2114 05/04/18  0401   PROT 6.0 5.4*   ALBUMIN 2.3* 1.8*   BILITOT 0.8 0.7   AST 69* 36   ALKPHOS 148* 115   ALT 30 21       COAGS    Recent Labs  Lab 05/04/18  0058 05/04/18  0401 05/05/18  0559 05/06/18  0542   INR 1.1  --   --   --    APTT 28.1 51.9* 39.1* 36.2*     CE    Recent Labs  Lab 05/03/18 2114   TROPONINI 0.010     BNP    Recent Labs  Lab 05/03/18  2114   *       LAST HbA1c  Lab Results   Component Value Date    HGBA1C 4.8 04/19/2018         Diagnostic Results:    IP CONSULT TO CARDIOLOGY  IP CONSULT TO PULMONOLOGY    ASSESSMENT/PLAN:     Neuro/Psych:  · A&O x 3  · Avoid sedating pain meds if possible- percocet dose yesterday appeared to cause worsening of condition  · Pt is on cymbalta and amytriptiline. Amytriptiline is less favorable in geriatric population. Will consider weaning  off.     Cardiovascular: (Hypertension, Chronic atrial fibrillation w/ hx of RVR)   · Home meds include amiodarone and diltiazem.  Will continue them to help ensure rate control &, thus, rythym control  · ECHO 4/2018 w/ EF 45%, grade 3 diastolic dysfunction  · Right heart strain evident on CTA  · Resuscitated with fluids, monitor BP  - -130s systolic overnight  · Pt with pacemaker     Pulmonary: (Bilat Pulm Embolisms, ?HAP)   · DuoNeb q 6 hours scheduled  · ABx: Vanc, zosyn  Moderate right-sided pleural effusion, will consult Pulm to evaluate for possible thoracentesis  Will order CXR this morning to evaluate cause of SOB.    GI/NUT:   · NPO   · GI Proph -- Protonix 40 mg IV daily while NPO     Renal/Electrolytes:   ·  cc  · Rodriguez resumed due to hx of urinary retention  -  yesterday with fluid boluses     Infectious Disease: (HAP)   ·  Pt met sepsis criteria with HR, WBC and source  ·  Concern for recurrent RLL PNA  · S/p 30 cc/kg of fluid  · Leukocytosis improving: WBC 16.3-> 12.1  · Sputum culture pending   · Blood cultures NGTD, urine cultures growing candida, asymptomatic  · Pt with hx of ESBL UTI  -will hold of on diflucan for now as can cause QT prolongation and patient is already on amiodarone     Hem/Coag: (Anemia, DVT Proph)   · Current Hgb =       · 10.8/33.6  ·  H&H stable  ·  On IV Heparin GGT for bilat pulm embolism with protocol for checking  PTT  ·  Cards on board; s/p cath lab thrombolysis     Cards recommending long-term AC with eliquis at discharge  ·  DVT Proph - Heparin     Endocrine:   · Conservative glycemic control, goal < 180 mg/dl     Musculoskeletal/Skin:   · Skin without acute findings   · Repositioning per ICU protocol    Chronic back and b/l foot pain with Hx of neuropathy on percocet 5 mg and gabapentin at home   Restart gabapentin but holding percocet for now    Dispo: CXR ordered given worsening SOB. F/u morning CBC and CMP. F/u Pulm for possible  thoracentesis    5/6/2018 Abdirizak Rosas MD  7:43 AM

## 2018-05-06 NOTE — HPI
Ms. Patton is a 78yo female with a PMHx of AF, CAD, HTN, s/p pacemaker admitted from the rehab center with chest pain. CTA in ED showed bilateral PE with compressive right-sided pleural effusion. Pt was admitted to ICU on 5/3/18 with cardiology on consult. On 5/4/18, EKOS catheter placed with catheter-directed tPA x6hrs. EF 10% by TTE. Pulmonary consulted for thoracentesis.

## 2018-05-06 NOTE — NURSING
Pt's Systolic Blood Pressure in 70's and Diastolic Blood Pressure ranging from 40's to 50's Dr. Ivey notified.

## 2018-05-07 PROBLEM — I50.40 COMBINED SYSTOLIC AND DIASTOLIC CONGESTIVE HEART FAILURE: Status: ACTIVE | Noted: 2018-01-01

## 2018-05-07 PROBLEM — J90 PLEURAL EFFUSION, RIGHT: Status: ACTIVE | Noted: 2018-01-01

## 2018-05-07 NOTE — PLAN OF CARE
Problem: Physical Therapy Goal  Goal: Physical Therapy Goal  Goals to be met by: 2018     Patient will increase functional independence with mobility by performin. Supine to sit with Stand-by Assistance  2. Sit to supine with Stand-by Assistance  3. Sit to stand transfer with Stand-by Assistance  4. Gait  x 300 feet with Stand-by Assistance using Rolling Walker.         Outcome: Ongoing (interventions implemented as appropriate)  Pt with improved tolerance today but continues to be limited by orthostatic hypotension. Pt required min A to stand and mod A for SPT to bedside chair 2/2 posterior leaning. Recommending SNF placement upon d/c.

## 2018-05-07 NOTE — PLAN OF CARE
rounded on patient. Patient states she is from [facility]. Her son Zach was not present at this time. Patient given business card and left contact info on white board.

## 2018-05-07 NOTE — PLAN OF CARE
Problem: Patient Care Overview  Goal: Plan of Care Review  Outcome: Ongoing (interventions implemented as appropriate)  Plan of care reviewed with patient and family; questions answered by ICU RN.

## 2018-05-07 NOTE — PLAN OF CARE
Problem: Occupational Therapy Goal  Goal: Occupational Therapy Goal  Goals to be met by: 6/5/18     Patient will increase functional independence with ADLs by performing:    LE Dressing with Minimal Assistance.  Grooming while standing with Contact Guard Assistance.  Toileting from bedside commode with Contact Guard Assistance for hygiene and clothing management.   Supine to sit with Contact Guard Assistance.  Stand pivot transfers with Contact Guard Assistance.  Toilet transfer to bedside commode with Contact Guard Assistance.  Increased functional strength to [facility] for self care skills and functional mobility.  Upper extremity exercise program x10 reps per handout, with independence.     Outcome: Ongoing (interventions implemented as appropriate)  Pt progressing well towards goals. Tolerated OOB axs this date as well as t/f to b/s chair. Performed supine and seated therex for endurance and cardiovascular management. SNF at d/c

## 2018-05-07 NOTE — ASSESSMENT & PLAN NOTE
CTA chest with extensive filling defects throughout the pulmonary tree involving the segmental vessels to the right upper, right lower, left upper and left lower lobes.  Bowing of the interventricular septum to the left with dilatation of the right-sided chambers noted. Evidence of LLL infarction noted.    Continue Heparin gtt until NOAC initiated     S/p EKOS and improvement in PASP and symptoms.

## 2018-05-07 NOTE — PT/OT/SLP PROGRESS
"Speech Language Pathology Treatment    Patient Name:  Elena Patton   MRN:  [id number]  Admitting Diagnosis: Bilateral pulmonary embolism    Recommendations:                 General Recommendations:  Dysphagia therapy  Diet recommendations:  Dental Soft, Liquid Diet Level: Thin   Aspiration Precautions: Coax/cue pt to participate in PO intake, 1 bite/sip at a time, Alternating bites/sips, Eliminate distractions, Feed only when awake/alert, Frequent oral care, Meds whole 1 at a time, Remain upright 30 minutes post meal and Small bites/sips   General Precautions: Standard, fall, aspiration  Communication strategies:  none    Subjective     Pt awake, alert and oriented to name and . Pt stated she was hungry and agreed to participate in dysphagia tx.    Patient goals: "I'm hungry, I'll eat" per pt     Pain/Comfort:  · Pain Rating 1: 0/10    Objective:     Has the patient been evaluated by SLP for swallowing?   Yes  Keep patient NPO? No   Current Respiratory Status: room air      Pt participated in direct dysphagia this AM. Pt tolerated thin liquids via cup x5, puree ~75% of apple sauce cup, and dental soft textures x2 with no overt s/s of aspiration. Pt deferred further trials of dental soft textures stating she was getting full. However, pt demonstrated incr'd participation in PO intake with SLP during session.  RN reported pt with limited PO intake over the weekend. SLP re-educated pt on importance of participation with PO intake. Pt acknowledged and confirmed understanding. However, pt will require frequent reinforcement.     Assessment:     Elena Patton is a 77 y.o. female with an SLP diagnosis of Dysphagia.  She presents with slightly improved participate in PO intake during ST session. However, per RN report pt with limited PO intake throughout entire weekend.   SLP recs: Con't dental soft/thin liquids po diet with coaxing/cuing to participate in PO intake as needed and all other universal swallow precautions. SLP " will continue to follow.      Goals:    SLP Goals        Problem: SLP Goal    Goal Priority Disciplines Outcome   SLP Goal     SLP Ongoing (interventions implemented as appropriate)   Description:  SLP Short Term Goals:  1. Patient will consume >50% dental soft diet with thin liquids with no overt s/s of aspiration given mod-max assist.-ongoing  2. Patient will implement safe swallowing strategies 100% of the time during meals given mod-max assist.                      Plan:     · Patient to be seen:  3 x/week   · Plan of Care expires:  06/03/18  · Plan of Care reviewed with:  patient (CASSIDY Ogden )   · SLP Follow-Up:  Yes       Discharge recommendations:  nursing facility, skilled   Time Tracking:     SLP Treatment Date:   05/07/18  Speech Start Time:  0945  Speech Stop Time:  0955     Speech Total Time (min):  10 min    Billable Minutes: Treatment Swallowing Dysfunction 10    LIZETTE Salcedo, CF-SLP  Speech-Language Pathologist   5/7/2018

## 2018-05-07 NOTE — PT/OT/SLP PROGRESS
"Physical Therapy Treatment    Patient Name:  Elena Patton   MRN:  [id number]    Recommendations:     Discharge Recommendations:  nursing facility, skilled   Discharge Equipment Recommendations: bedside commode   Barriers to discharge: orthostatic hypotension leading to decreased activity tolerance    Assessment:     Elena Patton is a 77 y.o. female admitted with a medical diagnosis of Bilateral pulmonary embolism.  She presents with the following impairments/functional limitations:  weakness, impaired endurance, impaired self care skills, impaired functional mobilty, gait instability, impaired balance, impaired cardiopulmonary response to activity, decreased upper extremity function, decreased lower extremity function. Pt with improved tolerance today but continues to be limited by orthostatic hypotension. Pt required min A to stand and mod A for SPT to bedside chair 2/2 posterior leaning. Recommending SNF placement upon d/c.    Rehab Prognosis: Good; patient would benefit from acute skilled PT services to address these deficits and reach maximum level of function.      Recent Surgery: Procedure(s) (LRB):  Venogram (N/A)      Plan:     During this hospitalization, patient to be seen 6 x/week to address the above listed problems via gait training, therapeutic activities, therapeutic exercises  · Plan of Care Expires:  06/05/18   Plan of Care Reviewed with: patient    Subjective     Communicated with CASSIDY Nath prior to session.  Patient found supine with HOB elevated upon PT entry to room, agreeable to treatment.      Chief Complaint: dizziness upon sitting and in standing  Patient comments/goals: "I'm going to sleep real well after this"  Pain/Comfort:  · Pain Rating 1: 0/10 (no pain at rest, c/o pain at R hand IV site when scooting forward to sit EOB)  · Pain Addressed 1: Pre-medicate for activity  · Pain Rating Post-Intervention 1: 0/10    Patients cultural, spiritual, Sabianist conflicts given the current " situation: none    Objective:     Patient found with: marley catheter, peripheral IV, pulse ox (continuous) (ICU monitoring)     General Precautions: Standard, fall   Orthopedic Precautions:N/A   Braces: N/A     Functional Mobility:  · Bed Mobility:     · Scooting: stand by assistance  · Supine to Sit: minimum assistance  · Transfers:     · Sit to Stand:  minimum assistance with rolling walker  · Bed to Chair: moderate assistance with  rolling walker  using  Stand Pivot  · Gait: 3-4 small steps during transfer with RW and mod A 2/2 posterior leaning    AM-PAC 6 CLICK MOBILITY  Turning over in bed (including adjusting bedclothes, sheets and blankets)?: 3  Sitting down on and standing up from a chair with arms (e.g., wheelchair, bedside commode, etc.): 3  Moving from lying on back to sitting on the side of the bed?: 3  Moving to and from a bed to a chair (including a wheelchair)?: 2  Need to walk in hospital room?: 1  Climbing 3-5 steps with a railing?: 1  Total Score: 13     Therapeutic Activities and Exercises:  BP assessed throughout session.  Supine: 97/63 mmHg  HOB elevated: 80/62 mmHg  HOB elevated following LE exercises: 92/66 mmHg  Sitting EOB: 88/63 mmHg  Standin/48 mmHg  Sittin/56 mmHg  Sitting in chair: 100/63 mmHg  Supine exercises: APs, heel slides, hip abduction slides  Pt sat EOB and completed seated exercises: APs, LAQs, and hip flexion  Pt completed 2 stands and only tolerated ~20 sec prior to sitting 2/2 dizziness and low BP  Pt transferred to chair and chair reclined. Educated pt to complete LE exercises.    Patient left up in chair with all lines intact, call button in reach and RN notified..    GOALS:    Physical Therapy Goals        Problem: Physical Therapy Goal    Goal Priority Disciplines Outcome Goal Variances Interventions   Physical Therapy Goal     PT/OT, PT Ongoing (interventions implemented as appropriate)     Description:  Goals to be met by: 2018     Patient will increase  functional independence with mobility by performin. Supine to sit with Stand-by Assistance  2. Sit to supine with Stand-by Assistance  3. Sit to stand transfer with Stand-by Assistance  4. Gait  x 300 feet with Stand-by Assistance using Rolling Walker.                          Time Tracking:     PT Received On: 18  PT Start Time: 1056     PT Stop Time: 1127  PT Total Time (min): 31 min with OT    Billable Minutes: Therapeutic Activity 15    Treatment Type: Treatment  PT/PTA: PT     PTA Visit Number: 0     Huma Trent, PT  2018

## 2018-05-07 NOTE — PT/OT/SLP PROGRESS
Occupational Therapy   Treatment    Name: Elena Patton  MRN: [id number]  Admitting Diagnosis:  Bilateral pulmonary embolism       Recommendations:     Discharge Recommendations: nursing facility, skilled  Discharge Equipment Recommendations:  bedside commode  Barriers to discharge:  Inaccessible home environment, Decreased caregiver support    Subjective     Communicated with: nsg prior to session.  Pain/Comfort:  · Pain Rating 1:  (no pain at rest; IV site pain with movement )    Patients cultural, spiritual, Restorationism conflicts given the current situation:      Objective:     Patient found with:      General Precautions: Standard, fall   Orthopedic Precautions:N/A   Braces: N/A     Occupational Performance:    Bed Mobility:    · Patient completed Scooting/Bridging with contact guard assistance  · Patient completed Supine to Sit with minimum assistance    Functional Mobility/Transfers:  · Patient completed Sit <> Stand Transfer with minimum assistance  with  rolling walker   · Patient completed Bed <> Chair Transfer using Step Transfer technique with moderate assistance with rolling walker  · Functional Mobility: Mod A 2/2 posterior leaning while standing     Activities of Daily Living:  · LB Dressing: total assistance    · Toileting: total assistance marley     Patient left up in chair with all lines intact, call button in reach and nsg present    [facility] 6 Click:  [facility] Total Score: 13    Treatment & Education:  Pt performing skills as listed above  Supine ROM/therex without resistance performed 1 x 10 chest press, bicep curls, straight arm raises (see PT note for LE exercises); performed 2/2 low BP when HOB elevated to upright position   Seated EOB increased time 2/2 dizziness and drop in BP- LE ROM/therex performed to elevate BP   Functional standing trials x 2 performed- drop in BP 1st trial requiring return to seated position   SPT bed-->BS chair mod A 2/2 impaired balance and posterior leaning with RW    Education:    Assessment:     Elena Patton is a 77 y.o. female with a medical diagnosis of Bilateral pulmonary embolism.  She presents with deconditioning, SOB, dizziness, drop in BP .  Performance deficits affecting function are weakness, impaired balance, impaired endurance, gait instability, impaired cardiopulmonary response to activity, impaired self care skills, impaired functional mobilty, decreased upper extremity function, decreased lower extremity function.  Pt progressing well towards goals. Tolerated OOB axs this date as well as t/f to b/s chair. Performed supine and seated therex for endurance and cardiovascular management. SNF at d/c    Rehab Prognosis:  Good ; patient would benefit from acute skilled OT services to address these deficits and reach maximum level of function.       Plan:     Patient to be seen 5 x/week to address the above listed problems via self-care/home management, therapeutic activities, therapeutic exercises  · Plan of Care Expires: 06/05/18  · Plan of Care Reviewed with: patient    This Plan of care has been discussed with the patient who was involved in its development and understands and is in agreement with the identified goals and treatment plan    GOALS:    Occupational Therapy Goals        Problem: Occupational Therapy Goal    Goal Priority Disciplines Outcome Interventions   Occupational Therapy Goal     OT, PT/OT Ongoing (interventions implemented as appropriate)    Description:  Goals to be met by: 6/5/18     Patient will increase functional independence with ADLs by performing:    LE Dressing with Minimal Assistance.  Grooming while standing with Contact Guard Assistance.  Toileting from bedside commode with Contact Guard Assistance for hygiene and clothing management.   Supine to sit with Contact Guard Assistance.  Stand pivot transfers with Contact Guard Assistance.  Toilet transfer to bedside commode with Contact Guard Assistance.  Increased functional strength to  WFL for self care skills and functional mobility.  Upper extremity exercise program x10 reps per handout, with independence.                      Time Tracking:     OT Date of Treatment: 05/07/18  OT Start Time: 1058  OT Stop Time: 1129  OT Total Time (min): 31 min    Billable Minutes:Therapeutic Exercises 15 co treatment with PT     Lucy Sanchez, OT  5/7/2018

## 2018-05-07 NOTE — SUBJECTIVE & OBJECTIVE
Past Medical History:   Diagnosis Date    Anticoagulant long-term use     Atrial fibrillation     Coronary artery disease     Hypertension     Renal disorder     Thyroid disease        Past Surgical History:   Procedure Laterality Date    APPENDECTOMY      CARDIAC SURGERY      HYSTERECTOMY      JOINT REPLACEMENT         Review of patient's allergies indicates:  No Known Allergies    No current facility-administered medications on file prior to encounter.      Current Outpatient Prescriptions on File Prior to Encounter   Medication Sig    amiodarone (PACERONE) 200 MG Tab Take by mouth once daily.    amitriptyline (ELAVIL) 10 MG tablet Take 10 mg by mouth 2 (two) times daily.    aspirin 325 MG tablet Take 1 tablet (325 mg total) by mouth once daily.    benzonatate (TESSALON) 100 MG capsule Take 100 mg by mouth 3 (three) times daily as needed for Cough.    cetirizine (ZYRTEC) 10 MG tablet Take 10 mg by mouth once daily.    diltiaZEM (CARDIZEM) 30 MG tablet Take 1 tablet (30 mg total) by mouth every 6 (six) hours.    DULoxetine (CYMBALTA) 30 MG capsule Take 30 mg by mouth once daily.    FERROUS SULFATE, BULK, MISC by Misc.(Non-Drug; Combo Route) route.    furosemide (LASIX) 40 MG tablet Take 1 tablet (40 mg total) by mouth once daily.    gabapentin (NEURONTIN) 600 MG tablet Take 600 mg by mouth 3 (three) times daily.    [] guaifenesin 100 mg/5 ml (ROBITUSSIN) 100 mg/5 mL syrup Take 10 mLs (200 mg total) by mouth every 4 (four) hours as needed for Cough or Congestion.    ipratropium (ATROVENT) 42 mcg (0.06 %) nasal spray 2 sprays by Nasal route 3 (three) times daily as needed for Rhinitis.    Lactobacillus acidoph-L.bulgar 1 million cell Chew Take 4 tablets by mouth 3 (three) times daily with meals.    levothyroxine (SYNTHROID) 25 MCG tablet Take 25 mcg by mouth once daily.    lubiprostone (AMITIZA) 24 MCG Cap Take 24 mcg by mouth 2 (two) times daily with meals.    multivitamin  (THERAGRAN) tablet Take 1 tablet by mouth once daily.    [] nitrofurantoin, macrocrystal-monohydrate, (MACROBID) 100 MG capsule Take 1 capsule (100 mg total) by mouth every 12 (twelve) hours. End date 18    oxyCODONE-acetaminophen (PERCOCET) 5-325 mg per tablet Take 1 tablet by mouth every 6 (six) hours as needed for Pain.    promethazine (PHENERGAN) 25 MG tablet Take 25 mg by mouth every 6 (six) hours as needed for Nausea.     Family History     None        Social History Main Topics    Smoking status: Never Smoker    Smokeless tobacco: Never Used    Alcohol use No    Drug use: No    Sexual activity: Not on file     Review of Systems   Constitution: Positive for weakness. Negative for diaphoresis.   HENT: Negative.    Eyes: Negative.    Cardiovascular: Negative for chest pain, dyspnea on exertion, irregular heartbeat, leg swelling, near-syncope, orthopnea, palpitations, paroxysmal nocturnal dyspnea and syncope.   Respiratory: Negative for cough, shortness of breath, sputum production and wheezing.    Endocrine: Negative.    Hematologic/Lymphatic: Negative.  Does not bruise/bleed easily.   Skin: Negative.    Musculoskeletal: Positive for falls.   Gastrointestinal: Negative for bloating, abdominal pain, heartburn, hematemesis, hematochezia, melena, nausea and vomiting.   Genitourinary: Negative.    Psychiatric/Behavioral: Negative.    Allergic/Immunologic: Negative.      Objective:     Vital Signs (Most Recent):  Temp: 97 °F (36.1 °C) (18 0701)  Pulse: 96 (18 1000)  Resp: 19 (18 1000)  BP: (!) 87/64 (18 1000)  SpO2: 98 % (18 1000) Vital Signs (24h Range):  Temp:  [97 °F (36.1 °C)-97.9 °F (36.6 °C)] 97 °F (36.1 °C)  Pulse:  [] 96  Resp:  [14-28] 19  SpO2:  [84 %-100 %] 98 %  BP: ()/(50-81) 87/64     Weight: 50.6 kg (111 lb 8.8 oz)  Body mass index is 19.76 kg/m².    SpO2: 98 %  O2 Device (Oxygen Therapy): nasal cannula      Intake/Output Summary (Last 24  hours) at 05/07/18 1023  Last data filed at 05/07/18 1000   Gross per 24 hour   Intake          1685.16 ml   Output             4125 ml   Net         -2439.84 ml       Lines/Drains/Airways     Drain                 Urethral Catheter 05/04/18 0403 14 Fr. 3 days          Peripheral Intravenous Line                 Peripheral IV - Single Lumen 05/03/18 2051 Left Forearm 3 days         Peripheral IV - Single Lumen 05/03/18 2052 Right Wrist 3 days                Physical Exam   Constitutional: She is oriented to person, place, and time. No distress.   HENT:   Head: Normocephalic and atraumatic.   Eyes: Right eye exhibits no discharge. Left eye exhibits no discharge.   Neck: No JVD present.   Cardiovascular: An irregularly irregular rhythm present. Tachycardia present.  Exam reveals no gallop.    Murmur heard.  Pulmonary/Chest: Effort normal. No tachypnea. She has decreased breath sounds. She has no rales.   Abdominal: Soft. Bowel sounds are normal.   Musculoskeletal: She exhibits no edema.   Neurological: She is alert and oriented to person, place, and time.   Skin: Skin is warm and dry. She is not diaphoretic.   Psychiatric: She has a normal mood and affect. Her behavior is normal. Judgment and thought content normal.       Significant Labs:   Blood Culture: No results for input(s): LABBLOO in the last 48 hours., CMP     Recent Labs  Lab 05/06/18  0542 05/07/18  0349   * 135*   K 4.1 3.2*    99   CO2 23 25   GLU 93 94   BUN 13 12   CREATININE 0.7 0.7   CALCIUM 8.6* 8.3*   PROT 5.6* 5.1*   ALBUMIN 1.8* 1.6*   BILITOT 0.5 0.5   ALKPHOS 106 90   AST 16 14   ALT 14 9*   ANIONGAP 9 11   ESTGFRAFRICA >60 >60   EGFRNONAA >60 >60   , CBC     Recent Labs  Lab 05/06/18  0542   WBC 12.14   HGB 12.1   HCT 38.1   *   , INR No results for input(s): INR, PROTIME in the last 48 hours., Lipid Panel No results for input(s): CHOL, HDL, LDLCALC, TRIG, CHOLHDL in the last 48 hours., Troponin No results for input(s):  TROPONINI in the last 48 hours. and All pertinent lab results from the last 24 hours have been reviewed.    Significant Imaging: Echocardiogram:   2D echo with color flow doppler:   Results for orders placed or performed during the hospital encounter of 05/03/18   2D echo with color flow doppler   Result Value Ref Range    EF 10 (A) 55 - 65    Mitral Valve Regurgitation SEVERE (A)     Aortic Valve Stenosis MILD (A)     Est. PA Systolic Pressure 29.42     Pericardial Effusion NONE     Tricuspid Valve Regurgitation MODERATE (A)

## 2018-05-07 NOTE — ASSESSMENT & PLAN NOTE
RVR 2/2 extensive PEs  Continue Amio and Toprol   Not on OAC 2/2 falls, will require OAC initiation prior to discharge for PE treatment   Initiate NOAC if no further invasive testing required

## 2018-05-07 NOTE — PROGRESS NOTES
Pulmonary & Critical Care Medicine Progress Note    Subjective:   No overnight events. No complaints of SOB. No hypoxia.    Vital Signs:   Vitals:    05/07/18 1145   BP:    Pulse:    Resp:    Temp: 97.3 °F (36.3 °C)     Fluid Balance:     Intake/Output Summary (Last 24 hours) at 05/07/18 1200  Last data filed at 05/07/18 1000   Gross per 24 hour   Intake          1185.16 ml   Output             3425 ml   Net         -2239.84 ml     Physical Exam:   General: NAD, cooperative & interactive.  HEENT: AT/NC, PERRL, EOMI, oral mucosa moist.   Neck: Supple without JVD or palpable LAD.   Cardiac: S1S2 with brisk cap refill in distal extremities.  Respiratory: Lungs clear bilaterally with no increased work of breathing.   Abdomen: Soft, NT/ND. +BS.   Extremities: No edema.   Neuro: Grossly intact to brief exam.    Laboratory Studies:   No results for input(s): PH, PCO2, PO2, HCO3, POCSATURATED, BE in the last 24 hours.  No results for input(s): WBC, RBC, HGB, HCT, PLT, MCV, MCH, MCHC in the last 24 hours.    Recent Labs  Lab 05/07/18  0349   *   K 3.2*   CL 99   CO2 25   BUN 12   CREATININE 0.7     Microbiology Data:   Microbiology Results (last 7 days)     Procedure Component Value Units Date/Time    Blood culture x two cultures. Draw prior to antibiotics. [[id number]] Collected:  05/03/18 2250    Order Status:  Completed Specimen:  Blood from Peripheral, Antecubital, Right Updated:  05/07/18 0612     Blood Culture, Routine No Growth to date     Blood Culture, Routine No Growth to date     Blood Culture, Routine No Growth to date     Blood Culture, Routine No Growth to date    Narrative:       Aerobic and anaerobic    Blood culture x two cultures. Draw prior to antibiotics. [[id number]] Collected:  05/03/18 2240    Order Status:  Completed Specimen:  Blood from Peripheral, Forearm, Right Updated:  05/07/18 0612     Blood Culture, Routine No Growth to date     Blood Culture, Routine No Growth to date     Blood Culture,  Routine No Growth to date     Blood Culture, Routine No Growth to date    Narrative:       Aerobic and anaerobic    Culture, Body Fluid - Bactec [[id number]] Collected:  18 1445    Order Status:  Completed Specimen:  Body Fluid from Thoracentesis Fluid Updated:  18 0115     Body Fluid Culture, Sterile No Growth to date    Gram stain [[id number]] Collected:  18 1445    Order Status:  Completed Specimen:  Body Fluid from Pleural Fluid Updated:  18 1909     Gram Stain Result Few WBC's      No organisms seen    Urine culture [[id number]] Collected:  18    Order Status:  Completed Specimen:  Urine from Urine, Catheterized Updated:  18 1123     Urine Culture, Routine --     PATRICK ALBICANS  50,000 - 99,999 cfu/ml  Treatment of asymptomatic candiduria is not recommended (except for   specific populations). Candida isolated in the urine typically   represents colonization. If an indwelling urinary catheter is present  it should be removed or replaced.      Culture, Respiratory with Gram Stain [[id number]]     Order Status:  No result Specimen:  Respiratory          Chest Imagin/6 CXR: Residual pleural effusion -  R lung, improved.    Infusions:     heparin (porcine) in D5W 22 Units/kg/hr (18 0600)     Scheduled Medications:    albuterol-ipratropium 2.5mg-0.5mg/3mL  3 mL Nebulization Q6H    amiodarone  200 mg Oral BID    amitriptyline  10 mg Oral BID    aspirin  325 mg Oral Daily    cetirizine  10 mg Oral Daily    DULoxetine  30 mg Oral Daily    gabapentin  600 mg Oral TID    levothyroxine  25 mcg Oral Before breakfast    lidocaine  1 patch Transdermal Q24H    lubiprostone  24 mcg Oral BID WM    pantoprazole 40 mg in dextrose 5 % 100 mL infusion (ready to mix system)  40 mg Intravenous Daily    piperacillin-tazobactam (ZOSYN) IVPB  4.5 g Intravenous Q8H    potassium chloride  40 mEq Oral Once    sodium chloride 0.9%  500 mL Intravenous Once    vancomycin  (VANCOCIN) IVPB  1,000 mg Intravenous Q24H     PRN Medications:   acetaminophen, benzonatate, bisacodyl, heparin (PORCINE), heparin (PORCINE), sodium chloride 0.9%    Assessment & Plan:   Patient Active Problem List   Diagnosis    Atrial fibrillation with RVR    Essential hypertension    Hypothyroidism    Right lower lobe pneumonia    UTI due to extended-spectrum beta lactamase (ESBL) producing Escherichia coli    Falls    Chronic combined systolic and diastolic heart failure    Urine retention    TIA due to embolism    Hypokalemia    Hypoxia    Bilateral pulmonary embolism    Sepsis    Pleural effusion, right    Combined systolic and diastolic congestive heart failure     This is a case of R pleural effusion in the setting of bilateral Pes and HF (EF 10-15%). Thoracentesis was completed over the weekend without cell count that shows infectious etiology. The effusion is likely 2/2 to patient's HF. Would continue diuresis and maintain euvolemia. Chronic HF effusions along with PE effusions can look exudative (as in this case).    Thank you for involving us in the care of this patient. We will sign off. Please call with any questions.    Yareli Jones MD  \[facility]\""/[facility], PGY 4  [facility]  Pager: [phone]

## 2018-05-07 NOTE — PROGRESS NOTES
Progress Note  [facility]    Admit Date: 5/3/2018   LOS: 3 days     SUBJECTIVE:     Follow-up For:  Extensive bilateral PE and R-sided pleural effusion     Patient interviewed and examined this morning. UOP 4125 in last 24 hrs. Patient has no complaints this morning. Endorses decreased appetite.      Scheduled Meds:   albuterol-ipratropium 2.5mg-0.5mg/3mL  3 mL Nebulization Q6H    amiodarone  200 mg Oral BID    amitriptyline  10 mg Oral BID    aspirin  325 mg Oral Daily    azithromycin  500 mg Intravenous Q24H    cetirizine  10 mg Oral Daily    DULoxetine  30 mg Oral Daily    gabapentin  600 mg Oral TID    levothyroxine  25 mcg Oral Before breakfast    lidocaine  1 patch Transdermal Q24H    lubiprostone  24 mcg Oral BID WM    pantoprazole 40 mg in dextrose 5 % 100 mL infusion (ready to mix system)  40 mg Intravenous Daily     Continuous Infusions:   heparin (porcine) in D5W 22 Units/kg/hr (05/07/18 0600)     PRN Meds:acetaminophen, benzonatate, bisacodyl, heparin (PORCINE), heparin (PORCINE), sodium chloride 0.9%    Review of patient's allergies indicates:  No Known Allergies    Review of Systems  Pos: chronic back pain  Neg: CP, SOB    OBJECTIVE:     Vital Signs (Most Recent)  Temp: 96.4 °F (35.8 °C) (05/07/18 1515)  Pulse: 87 (05/07/18 1315)  Resp: (!) 24 (05/07/18 1315)  BP: (!) 87/64 (05/07/18 1000)  SpO2: 95 % (05/07/18 1315)    Vital Signs Range (Last 24H):  Temp:  [96.4 °F (35.8 °C)-97.9 °F (36.6 °C)]   Pulse:  []   Resp:  [14-27]   BP: ()/(50-81)   SpO2:  [95 %-100 %]     I & O (Last 24H):    Intake/Output Summary (Last 24 hours) at 05/07/18 1647  Last data filed at 05/07/18 1000   Gross per 24 hour   Intake           814.36 ml   Output             1725 ml   Net          -910.64 ml     Wt Readings from Last 3 Encounters:   05/07/18 50.6 kg (111 lb 8.8 oz)   04/26/18 48.8 kg (107 lb 9.4 oz)   12/11/17 49 kg (108 lb)     Physical Exam:  Gen: NAD  HEENT: NC, AT  Chest:  decreased breath sounds b/l, worse on R lower lung field, and mild bibasilar rales b/l   CVS: irregular irregular rhythm, no m/r/g  Abdomen: soft, NT, ND, no masses, +BS  Ext: no edema, pulses 2+   Skin: ro rashes  Neuro: A&O x 3, CN's grossly intact, sensation intact to light touch  Psych: Normal mood, affect, thought content    Laboratory:  LABS  CBC    Recent Labs  Lab 05/04/18  0401 05/05/18  0008 05/06/18  0542   WBC 16.37* 12.18 12.14   RBC 3.88* 3.54* 4.04   HGB 11.5* 10.8* 12.1   HCT 36.4* 33.6* 38.1    242 351*   MCV 94 95 94   MCH 29.6 30.5 30.0   MCHC 31.6* 32.1 31.8*     BMP    Recent Labs  Lab 05/04/18  0401 05/06/18  0542 05/07/18  0349   * 132* 135*   K 4.3 4.1 3.2*   CO2 27 23 25   CL 95 100 99   BUN 18 13 12   CREATININE 0.8 0.7 0.7   * 93 94         Recent Labs  Lab 05/04/18  0401 05/06/18  0542 05/07/18  0349   CALCIUM 8.0* 8.6* 8.3*   MG 1.8  --   --    PHOS 3.4  --   --      LFT    Recent Labs  Lab 05/04/18  0401 05/06/18  0542 05/07/18  0349   PROT 5.4* 5.6* 5.1*   ALBUMIN 1.8* 1.8* 1.6*   BILITOT 0.7 0.5 0.5   AST 36 16 14   ALKPHOS 115 106 90   ALT 21 14 9*       COAGS    Recent Labs  Lab 05/04/18  0058  05/06/18  0542 05/06/18  1829 05/07/18  0349   INR 1.1  --   --   --   --    APTT 28.1  < > 36.2* 22.5 68.3*   < > = values in this interval not displayed.  CE    Recent Labs  Lab 05/03/18 2114   TROPONINI 0.010     BNP    Recent Labs  Lab 05/03/18  2114   *       LAST HbA1c  Lab Results   Component Value Date    HGBA1C 4.8 04/19/2018         Diagnostic Results:    IP CONSULT TO CARDIOLOGY  IP CONSULT TO PULMONOLOGY    ASSESSMENT/PLAN:     Neuro/Psych:  · A&O x 3  · Avoid sedating pain meds if possible- percocet dose yesterday appeared to cause worsening of condition  · Pt is on cymbalta and amytriptiline. Amytriptiline is less favorable in geriatric population. Will consider weaning off.     Cardiovascular: (Hypertension, Chronic atrial fibrillation w/ hx of RVR)    · Home meds include amiodarone and diltiazem.  Will continue them to help ensure rate control &, thus, rythym control  · ECHO 4/2018 w/ EF 45%, grade 3 diastolic dysfunction  · Right heart strain evident on CTA  · Pt with pacemaker  Gave fluids this AM for orthostatic hypotension which did improve s/p fluids     Pulmonary: (Bilat Pulm Embolisms, ?Pneumonia)   · DuoNeb q 6 hours scheduled  · ABx: deescalated to Azithromycin IV   Moderate right-sided pleural effusion drained by Pulm, removed 2L   Maintaining O2 saturations on 2L NC    GI/NUT:   · NPO   · GI Proph -- Protonix 40 mg IV daily while NPO     Renal/Electrolytes:   ·  cc  · Rodriguez resumed due to hx of urinary retention  -  yesterday with fluid boluses     Infectious Disease: (HAP)   ·  Pt met sepsis criteria with HR, WBC and source  ·  Concern for recurrent RLL PNA  · S/p 30 cc/kg of fluid  · Leukocytosis improving: WBC 16.3-> 12.1  · Sputum culture pending   · Blood cultures NGTD, urine cultures growing candida, asymptomatic  · Pt with hx of ESBL UTI  - will hold of on diflucan for now as can cause QT prolongation and patient is already on amiodarone     Hem/Coag: (Anemia, DVT Proph)   · Current Hgb =       · 10.8/33.6  ·  H&H has been stable  ·  On IV Heparin GGT for bilat pulm embolism with protocol for checking  PTT  ·  Cards on board; s/p cath lab thrombolysis     Cards recommending long-term AC with eliquis at discharge  ·  DVT Proph - Heparin     Endocrine:   · Conservative glycemic control, goal < 180 mg/dl     Musculoskeletal/Skin:   · Sacral erythema  · Repositioning per ICU protocol    Chronic back and b/l foot pain with Hx of neuropathy on percocet 5 mg and gabapentin at home   Restarted gabapentin but holding percocet    Dispo: Patient needs to work with PT/OT today; possible transfer to floor today    5/7/2018 Tushar Cho MD  7:43 AM

## 2018-05-07 NOTE — PLAN OF CARE
Problem: SLP Goal  Goal: SLP Goal  SLP Short Term Goals:  1. Patient will consume >50% dental soft diet with thin liquids with no overt s/s of aspiration given mod-max assist.-ongoing  2. Patient will implement safe swallowing strategies 100% of the time during meals given mod-max assist.    Outcome: Ongoing (interventions implemented as appropriate)  5/7/18: Pt participated in direct dysphagia this AM. Pt tolerated thin liquids, puree, and dental soft textures with no overt s/s of aspiration. Pt also demonstrated incr'd participation in PO intake with SLP during session. See note for details. SLP recs: Con't dental soft/thin liquids po diet with coaxing/cuing to participate in PO intake as needed and all other universal swallow precautions.   KAMAR Salcedo., CF-SLP  Speech-Language Pathologist

## 2018-05-07 NOTE — PROGRESS NOTES
[facility]  Cardiology  Progress Note    Patient Name: Elena Patton  MRN: [id number]  Admission Date: 5/3/2018  Hospital Length of Stay: 3 days  Code Status: Full Code   Attending Physician: Riky Santos III, MD   Primary Care Physician: Gael Chao MD  Expected Discharge Date:   Principal Problem:Bilateral pulmonary embolism    Subjective:     Hospital Course:   05/04/2018 Per HPI.     5/5/2018:  Overnight no acute events. S/p EKOS catheter directed thrombolysis with improvement in PASP 50-30. Feels better. Hemodynamically stable. On heparin gtt currently.     5/6/20189:  Overnight no acute events. S/p thoracentesis this AM and hypotensive. Breathing fine.    05/07/2018 Hypotension persists. BB held. AF on tele with HR 90s-110s. Remains on heparin gtt. Doing well from cardiac perspective.     Past Medical History:   Diagnosis Date    Anticoagulant long-term use     Atrial fibrillation     Coronary artery disease     Hypertension     Renal disorder     Thyroid disease        Past Surgical History:   Procedure Laterality Date    APPENDECTOMY      CARDIAC SURGERY      HYSTERECTOMY      JOINT REPLACEMENT         Review of patient's allergies indicates:  No Known Allergies    No current facility-administered medications on file prior to encounter.      Current Outpatient Prescriptions on File Prior to Encounter   Medication Sig    amiodarone (PACERONE) 200 MG Tab Take by mouth once daily.    amitriptyline (ELAVIL) 10 MG tablet Take 10 mg by mouth 2 (two) times daily.    aspirin 325 MG tablet Take 1 tablet (325 mg total) by mouth once daily.    benzonatate (TESSALON) 100 MG capsule Take 100 mg by mouth 3 (three) times daily as needed for Cough.    cetirizine (ZYRTEC) 10 MG tablet Take 10 mg by mouth once daily.    diltiaZEM (CARDIZEM) 30 MG tablet Take 1 tablet (30 mg total) by mouth every 6 (six) hours.    DULoxetine (CYMBALTA) 30 MG capsule Take 30 mg by mouth once daily.     FERROUS SULFATE, BULK, MISC by Misc.(Non-Drug; Combo Route) route.    furosemide (LASIX) 40 MG tablet Take 1 tablet (40 mg total) by mouth once daily.    gabapentin (NEURONTIN) 600 MG tablet Take 600 mg by mouth 3 (three) times daily.    [] guaifenesin 100 mg/5 ml (ROBITUSSIN) 100 mg/5 mL syrup Take 10 mLs (200 mg total) by mouth every 4 (four) hours as needed for Cough or Congestion.    ipratropium (ATROVENT) 42 mcg (0.06 %) nasal spray 2 sprays by Nasal route 3 (three) times daily as needed for Rhinitis.    Lactobacillus acidoph-L.bulgar 1 million cell Chew Take 4 tablets by mouth 3 (three) times daily with meals.    levothyroxine (SYNTHROID) 25 MCG tablet Take 25 mcg by mouth once daily.    lubiprostone (AMITIZA) 24 MCG Cap Take 24 mcg by mouth 2 (two) times daily with meals.    multivitamin (THERAGRAN) tablet Take 1 tablet by mouth once daily.    [] nitrofurantoin, macrocrystal-monohydrate, (MACROBID) 100 MG capsule Take 1 capsule (100 mg total) by mouth every 12 (twelve) hours. End date 18    oxyCODONE-acetaminophen (PERCOCET) 5-325 mg per tablet Take 1 tablet by mouth every 6 (six) hours as needed for Pain.    promethazine (PHENERGAN) 25 MG tablet Take 25 mg by mouth every 6 (six) hours as needed for Nausea.     Family History     None        Social History Main Topics    Smoking status: Never Smoker    Smokeless tobacco: Never Used    Alcohol use No    Drug use: No    Sexual activity: Not on file     Review of Systems   Constitution: Positive for weakness. Negative for diaphoresis.   HENT: Negative.    Eyes: Negative.    Cardiovascular: Negative for chest pain, dyspnea on exertion, irregular heartbeat, leg swelling, near-syncope, orthopnea, palpitations, paroxysmal nocturnal dyspnea and syncope.   Respiratory: Negative for cough, shortness of breath, sputum production and wheezing.    Endocrine: Negative.    Hematologic/Lymphatic: Negative.  Does not bruise/bleed easily.    Skin: Negative.    Musculoskeletal: Positive for falls.   Gastrointestinal: Negative for bloating, abdominal pain, heartburn, hematemesis, hematochezia, melena, nausea and vomiting.   Genitourinary: Negative.    Psychiatric/Behavioral: Negative.    Allergic/Immunologic: Negative.      Objective:     Vital Signs (Most Recent):  Temp: 97 °F (36.1 °C) (05/07/18 0701)  Pulse: 96 (05/07/18 1000)  Resp: 19 (05/07/18 1000)  BP: (!) 87/64 (05/07/18 1000)  SpO2: 98 % (05/07/18 1000) Vital Signs (24h Range):  Temp:  [97 °F (36.1 °C)-97.9 °F (36.6 °C)] 97 °F (36.1 °C)  Pulse:  [] 96  Resp:  [14-28] 19  SpO2:  [84 %-100 %] 98 %  BP: ()/(50-81) 87/64     Weight: 50.6 kg (111 lb 8.8 oz)  Body mass index is 19.76 kg/m².    SpO2: 98 %  O2 Device (Oxygen Therapy): nasal cannula      Intake/Output Summary (Last 24 hours) at 05/07/18 1023  Last data filed at 05/07/18 1000   Gross per 24 hour   Intake          1685.16 ml   Output             4125 ml   Net         -2439.84 ml       Lines/Drains/Airways     Drain                 Urethral Catheter 05/04/18 0403 14 Fr. 3 days          Peripheral Intravenous Line                 Peripheral IV - Single Lumen 05/03/18 2051 Left Forearm 3 days         Peripheral IV - Single Lumen 05/03/18 2052 Right Wrist 3 days                Physical Exam   Constitutional: She is oriented to person, place, and time. No distress.   HENT:   Head: Normocephalic and atraumatic.   Eyes: Right eye exhibits no discharge. Left eye exhibits no discharge.   Neck: No JVD present.   Cardiovascular: An irregularly irregular rhythm present. Tachycardia present.  Exam reveals no gallop.    Murmur heard.  Pulmonary/Chest: Effort normal. No tachypnea. She has decreased breath sounds. She has no rales.   Abdominal: Soft. Bowel sounds are normal.   Musculoskeletal: She exhibits no edema.   Neurological: She is alert and oriented to person, place, and time.   Skin: Skin is warm and dry. She is not diaphoretic.    Psychiatric: She has a normal mood and affect. Her behavior is normal. Judgment and thought content normal.       Significant Labs:   Blood Culture: No results for input(s): LABBLOO in the last 48 hours., CMP     Recent Labs  Lab 05/06/18  0542 05/07/18  0349   * 135*   K 4.1 3.2*    99   CO2 23 25   GLU 93 94   BUN 13 12   CREATININE 0.7 0.7   CALCIUM 8.6* 8.3*   PROT 5.6* 5.1*   ALBUMIN 1.8* 1.6*   BILITOT 0.5 0.5   ALKPHOS 106 90   AST 16 14   ALT 14 9*   ANIONGAP 9 11   ESTGFRAFRICA >60 >60   EGFRNONAA >60 >60   , CBC     Recent Labs  Lab 05/06/18  0542   WBC 12.14   HGB 12.1   HCT 38.1   *   , INR No results for input(s): INR, PROTIME in the last 48 hours., Lipid Panel No results for input(s): CHOL, HDL, LDLCALC, TRIG, CHOLHDL in the last 48 hours., Troponin No results for input(s): TROPONINI in the last 48 hours. and All pertinent lab results from the last 24 hours have been reviewed.    Significant Imaging: Echocardiogram:   2D echo with color flow doppler:   Results for orders placed or performed during the hospital encounter of 05/03/18   2D echo with color flow doppler   Result Value Ref Range    EF 10 (A) 55 - 65    Mitral Valve Regurgitation SEVERE (A)     Aortic Valve Stenosis MILD (A)     Est. PA Systolic Pressure 29.42     Pericardial Effusion NONE     Tricuspid Valve Regurgitation MODERATE (A)      Assessment and Plan:     Brief HPI: Patient seen this morning on rounds without cardiac complaint. Breathing improved. BP marginal.     * Bilateral pulmonary embolism    CTA chest with extensive filling defects throughout the pulmonary tree involving the segmental vessels to the right upper, right lower, left upper and left lower lobes.  Bowing of the interventricular septum to the left with dilatation of the right-sided chambers noted. Evidence of LLL infarction noted.    Continue Heparin gtt until NOAC initiated     S/p EKOS and improvement in PASP and symptoms.           Chronic  combined systolic and diastolic heart failure      Euvolemic on exam   Currently on BB        Essential hypertension      Hold antihypertensives BP remains marginal following thoracentesis         Atrial fibrillation with RVR    RVR 2/2 extensive PEs  Continue Amio and Toprol   Not on OAC 2/2 falls, will require OAC initiation prior to discharge for PE treatment   Initiate NOAC if no further invasive testing required             VTE Risk Mitigation         Ordered     heparin 25,000 units in dextrose 5% 250 mL (100 units/mL) infusion; FEMALE  Continuous      05/04/18 0334     heparin 25,000 units in dextrose 5% 250 mL (100 units/mL) bolus from bag; PRN BOLUS  As needed (PRN)      05/04/18 0334     heparin 25,000 units in dextrose 5% 250 mL (100 units/mL) bolus from bag; PRN BOLUS  As needed (PRN)      05/04/18 0334     IP VTE HIGH RISK PATIENT  Once      05/04/18 0322          Alvin Shah NP  Cardiology  [facility]

## 2018-05-07 NOTE — PLAN OF CARE
TN went to meet with patient, no family at bedside.  Patient is from [facility]. She is unsure how long she has been there. TN faxed [facility] patient's recent clinicals. Will call family to update as well.     TN spoke with Yue with admissions at [facility]. She stated patient has been there skilled since 4/26. TN informed her therapy is still recommending skilled placement on discharge.     TN called and spoke with son Zach. He was in agreement for discharge back skilled to [facility] (when medically ready). TN provided him with name and number if he had any further questions. Will continue to follow.    Future Appointments  Date Time Provider Department Center   5/14/2018 10:40 AM Zach Corona MD PhD [facility]IO [facility]   5/22/2018 2:00 PM Sriram Han MD [facility] [place]        05/07/18 1056   Discharge Reassessment   Assessment Type Discharge Planning Reassessment   Provided patient/caregiver education on the expected discharge date and the discharge plan No   Discharge Plan A Skilled Nursing Facility   Discharge Plan B Home with family;Home Health   Patient choice form signed by patient/caregiver N/A   How does the patient rate their overall health at the present time? Good   Describe the patient's ability to walk at the present time. Walks with the help of equipment   How often would a person be available to care for the patient? Whenever needed     Fabiola Dowling RN  Transition Navigator  [phone]

## 2018-05-08 PROBLEM — E87.1 HYPONATREMIA: Status: ACTIVE | Noted: 2018-01-01

## 2018-05-08 PROBLEM — R07.9 CHEST PAIN: Status: ACTIVE | Noted: 2018-01-01

## 2018-05-08 PROBLEM — J18.9 HCAP (HEALTHCARE-ASSOCIATED PNEUMONIA): Status: ACTIVE | Noted: 2018-01-01

## 2018-05-08 PROBLEM — I26.99 PULMONARY EMBOLISM WITH INFARCTION: Status: ACTIVE | Noted: 2018-01-01

## 2018-05-08 NOTE — PLAN OF CARE
TN faxed updated clinicals via [facility] to [facility].    1130: TN went to meet with patient, asleep at this time. No family present at bedside. TN reviewed patient's clinicals. Therapy still recommending skilled. TN will continue to follow and assess discharge needs.     05/08/18 1210   Discharge Reassessment   Assessment Type Discharge Planning Reassessment   Provided patient/caregiver education on the expected discharge date and the discharge plan No   Discharge Plan A Skilled Nursing Facility   Discharge Plan B Home with family;Home Health   Patient choice form signed by patient/caregiver N/A   Describe the patient's ability to walk at the present time. Walks with the help of equipment     Fabiola Dowling RN  Transition Navigator  [phone]

## 2018-05-08 NOTE — PT/OT/SLP PROGRESS
Occupational Therapy   Treatment    Name: Elena Patton  MRN: [id number]  Admitting Diagnosis:  Bilateral pulmonary embolism       Recommendations:     Discharge Recommendations: nursing facility, skilled  Discharge Equipment Recommendations:  bedside commode  Barriers to discharge:  Inaccessible home environment, Decreased caregiver support    Subjective     Communicated with: RN prior to session.  Pain/Comfort:  · Pain Rating 1: 0/10  · Pain Rating Post-Intervention 1: 0/10    Patients cultural, spiritual, Judaism conflicts given the current situation:      Objective:     Patient found with: oxygen, peripheral IV, marley catheter, pulse ox (continuous) (multiple ICU lines and monitors)    General Precautions: Standard, fall   Orthopedic Precautions:N/A   Braces: N/A     Occupational Performance:    Bed Mobility:    · Patient completed Rolling/Turning to Right with minimum assistance and with side rail  · Patient completed Scooting/Bridging with minimum assistance and scoot seated to EOB  · Patient completed Supine to Sit with minimum assistance, with side rail and vc's for effective technique, increased effort     Functional Mobility/Transfers:  · Patient completed Sit <> Stand Transfer with moderate assistance  with  rolling walker   · Patient completed Bed <> Chair Transfer using Stand Pivot technique with moderate assistance and maximal assistance with rolling walker  · Patient completed Toilet Transfer Stand Pivot technique with moderate assistance and maximal assistance with  bedside commode and rolling walker  · Functional Mobility: Pt took 2 steps with each transfer requiring Mod- Max A using RW secondary to significant posterior lean and increased fear/anxiety during transition.  Pt with difficulty following verbal cues to correct posture and for RW safety/mgmt.    Activities of Daily Living:  · Toileting: maximal assistance for perianal care in squat    Patient left up in chair with all lines intact, call  button in reach and RN notified    [facility] 6 Click:  [facility] Total Score: 14    Education:    Assessment:     Elena Patton is a 77 y.o. female with a medical diagnosis of Bilateral pulmonary embolism.  She presents with decreased overall strength, endurance, balance and Colon and safety with ADL's and fx mobility.   Performance deficits affecting function are weakness, impaired endurance, impaired self care skills, impaired functional mobilty, gait instability, impaired balance, decreased upper extremity function, decreased lower extremity function, decreased safety awareness, pain, impaired cardiopulmonary response to activity.  Pt requires Mod-Max A with transfers 2/2 posterior lean and increased anxiety secondary to fear of falling. Vitals stable throughout. Continue OT services to address functional goals, progressing as able.      Rehab Prognosis:  good; patient would benefit from acute skilled OT services to address these deficits and reach maximum level of function.       Plan:     Patient to be seen 5 x/week to address the above listed problems via self-care/home management, therapeutic activities, therapeutic exercises  · Plan of Care Expires: 06/05/18  · Plan of Care Reviewed with: patient    This Plan of care has been discussed with the patient who was involved in its development and understands and is in agreement with the identified goals and treatment plan    GOALS:    Occupational Therapy Goals        Problem: Occupational Therapy Goal    Goal Priority Disciplines Outcome Interventions   Occupational Therapy Goal     OT, PT/OT Ongoing (interventions implemented as appropriate)    Description:  Goals to be met by: 6/5/18     Patient will increase functional independence with ADLs by performing:    LE Dressing with Minimal Assistance.  Grooming while standing with Contact Guard Assistance.  Toileting from bedside commode with Contact Guard Assistance for hygiene and clothing management.   Supine to  sit with Contact Guard Assistance.  Stand pivot transfers with Contact Guard Assistance.  Toilet transfer to bedside commode with Contact Guard Assistance.  Increased functional strength to WFL for self care skills and functional mobility.  Upper extremity exercise program x10 reps per handout, with independence.                      Time Tracking:     OT Date of Treatment: 05/08/18  OT Start Time: 1220  OT Stop Time: 1249  OT Total Time (min): 29 min    Billable Minutes:Self Care/Home Management 29    SANCHEZ Christianson  5/8/2018

## 2018-05-08 NOTE — PLAN OF CARE
Problem: Occupational Therapy Goal  Goal: Occupational Therapy Goal  Goals to be met by: 6/5/18     Patient will increase functional independence with ADLs by performing:    LE Dressing with Minimal Assistance.  Grooming while standing with Contact Guard Assistance.  Toileting from bedside commode with Contact Guard Assistance for hygiene and clothing management.   Supine to sit with Contact Guard Assistance.  Stand pivot transfers with Contact Guard Assistance.  Toilet transfer to bedside commode with Contact Guard Assistance.  Increased functional strength to WFL for self care skills and functional mobility.  Upper extremity exercise program x10 reps per handout, with independence.     Outcome: Ongoing (interventions implemented as appropriate)  Elena Patton is a 77 y.o. female with a medical diagnosis of Bilateral pulmonary embolism.  She presents with decreased overall strength, endurance, balance and McDonald and safety with ADL's and fx mobility.   Performance deficits affecting function are weakness, impaired endurance, impaired self care skills, impaired functional mobilty, gait instability, impaired balance, decreased upper extremity function, decreased lower extremity function, decreased safety awareness, pain, impaired cardiopulmonary response to activity.  Pt requires Mod-Max A with transfers 2/2 posterior lean and increased anxiety secondary to fear of falling. Vitals stable throughout. Continue OT services to address functional goals, progressing as able.    MELBA Christianson/L

## 2018-05-08 NOTE — PLAN OF CARE
Notified Dr. Fiore that patients U/O has been 10-20cc/hr today. Patient is not drinking enough fluids and does not have any IV fluids ordered. Received orders to start patient on D51/2NS w/ 20KCL @ 100cc/hr. Will implement orders.

## 2018-05-09 PROBLEM — I82.403 ACUTE DEEP VEIN THROMBOSIS (DVT) OF BOTH LOWER EXTREMITIES: Status: ACTIVE | Noted: 2018-01-01

## 2018-05-09 NOTE — SUBJECTIVE & OBJECTIVE
Past Medical History:   Diagnosis Date    Anticoagulant long-term use     Atrial fibrillation     Coronary artery disease     Hypertension     Renal disorder     Thyroid disease        Past Surgical History:   Procedure Laterality Date    APPENDECTOMY      CARDIAC SURGERY      HYSTERECTOMY      JOINT REPLACEMENT         Review of patient's allergies indicates:  No Known Allergies    No current facility-administered medications on file prior to encounter.      Current Outpatient Prescriptions on File Prior to Encounter   Medication Sig    amiodarone (PACERONE) 200 MG Tab Take by mouth once daily.    amitriptyline (ELAVIL) 10 MG tablet Take 10 mg by mouth 2 (two) times daily.    aspirin 325 MG tablet Take 1 tablet (325 mg total) by mouth once daily.    benzonatate (TESSALON) 100 MG capsule Take 100 mg by mouth 3 (three) times daily as needed for Cough.    cetirizine (ZYRTEC) 10 MG tablet Take 10 mg by mouth once daily.    diltiaZEM (CARDIZEM) 30 MG tablet Take 1 tablet (30 mg total) by mouth every 6 (six) hours.    DULoxetine (CYMBALTA) 30 MG capsule Take 30 mg by mouth once daily.    FERROUS SULFATE, BULK, MISC by Misc.(Non-Drug; Combo Route) route.    furosemide (LASIX) 40 MG tablet Take 1 tablet (40 mg total) by mouth once daily.    gabapentin (NEURONTIN) 600 MG tablet Take 600 mg by mouth 3 (three) times daily.    ipratropium (ATROVENT) 42 mcg (0.06 %) nasal spray 2 sprays by Nasal route 3 (three) times daily as needed for Rhinitis.    Lactobacillus acidoph-L.bulgar 1 million cell Chew Take 4 tablets by mouth 3 (three) times daily with meals.    levothyroxine (SYNTHROID) 25 MCG tablet Take 25 mcg by mouth once daily.    lubiprostone (AMITIZA) 24 MCG Cap Take 24 mcg by mouth 2 (two) times daily with meals.    multivitamin (THERAGRAN) tablet Take 1 tablet by mouth once daily.    oxyCODONE-acetaminophen (PERCOCET) 5-325 mg per tablet Take 1 tablet by mouth every 6 (six) hours as needed for  Pain.    promethazine (PHENERGAN) 25 MG tablet Take 25 mg by mouth every 6 (six) hours as needed for Nausea.     Family History     None        Social History Main Topics    Smoking status: Never Smoker    Smokeless tobacco: Never Used    Alcohol use No    Drug use: No    Sexual activity: Not on file     Review of Systems   Constitution: Negative for chills, decreased appetite, diaphoresis, fever and weakness.   Cardiovascular: Negative for chest pain, claudication, cyanosis, dyspnea on exertion, irregular heartbeat, leg swelling, near-syncope, orthopnea, palpitations, paroxysmal nocturnal dyspnea and syncope.   Respiratory: Negative for cough, hemoptysis, shortness of breath and wheezing.    Gastrointestinal: Negative for bloating, abdominal pain, constipation, diarrhea, melena, nausea and vomiting.   Neurological: Negative for dizziness.     Objective:     Vital Signs (Most Recent):  Temp: 97.3 °F (36.3 °C) (05/09/18 0705)  Pulse: (!) 116 (05/09/18 1100)  Resp: (!) 24 (05/09/18 1100)  BP: (!) 145/91 (05/09/18 1100)  SpO2: (!) 94 % (05/09/18 1100) Vital Signs (24h Range):  Temp:  [97.3 °F (36.3 °C)-97.9 °F (36.6 °C)] 97.3 °F (36.3 °C)  Pulse:  [] 116  Resp:  [13-45] 24  SpO2:  [77 %-100 %] 94 %  BP: ()/() 145/91     Weight: 52.1 kg (114 lb 13.8 oz)  Body mass index is 20.35 kg/m².    SpO2: (!) 94 %  O2 Device (Oxygen Therapy): nasal cannula      Intake/Output Summary (Last 24 hours) at 05/09/18 1209  Last data filed at 05/09/18 1100   Gross per 24 hour   Intake          1977.23 ml   Output             1250 ml   Net           727.23 ml       Lines/Drains/Airways     Drain                 Urethral Catheter 05/04/18 0403 14 Fr. 5 days          Peripheral Intravenous Line                 Peripheral IV - Single Lumen 05/03/18 2052 Right Wrist 5 days         Peripheral IV - Single Lumen 05/07/18 1700 Right Forearm 1 day                Physical Exam   Constitutional: She appears cachectic.    Cardiovascular: An irregularly irregular rhythm present. Tachycardia present.    Pulmonary/Chest: Effort normal and breath sounds normal.   Musculoskeletal: She exhibits edema (1+ BLE edema present ).   Neurological: She is alert.       Significant Labs:       Recent Labs  Lab 05/09/18  0324      K 3.4*      CO2 26   BUN 10   CREATININE 0.7       Recent Labs  Lab 05/09/18  0324   WBC 6.63   RBC 3.54*   HGB 10.4*   HCT 32.9*      MCV 93   MCH 29.4   MCHC 31.6*       Significant Imaging: Echocardiogram:   2D echo with color flow doppler:   Results for orders placed or performed during the hospital encounter of 05/03/18   2D echo with color flow doppler   Result Value Ref Range    EF 10 (A) 55 - 65    Mitral Valve Regurgitation SEVERE (A)     Aortic Valve Stenosis MILD (A)     Est. PA Systolic Pressure 29.42     Pericardial Effusion NONE     Tricuspid Valve Regurgitation MODERATE (A)

## 2018-05-09 NOTE — PROGRESS NOTES
" [facility]  Adult Nutrition  Progress Note    SUMMARY       Recommendations    Recommendation/Intervention:   1. Add Boost Plus bid.   2. Continue to encourage intake at meals.   3. If po intake does not improve, pt may require nutritional support    Goals:   Pt will consume at least 50% intake at meals  Nutrition Goal Status:  (continues)  Communication of RD Recs: reviewed with RN (Ilda)    Reason for Assessment  Reason for Assessment: RD follow-up  Diagnosis:  (B pulmonary embolism)  Relevant Medical History: HTN, renal disorder, thyroid disease, CAD, cardiac surgery, appendectomy  General Information Comments: Pt on dysphagi asoft Cardiac diet with very poor intake at meals. Discussed with RN.    Nutrition Risk Screen  Nutrition Risk Screen: no indicators present    Nutrition/Diet History  Food Preferences: no Temple or cultural food prefs identified  Do you have any cultural, spiritual, Temple conflicts, given your current situation?:  (none)  Factors Affecting Nutritional Intake: decreased appetite    Anthropometrics  Temp: 97.6 °F (36.4 °C)  Height Method: Stated  Height: 5' 3" (160 cm)  Height (inches): 63 in  Weight Method: Bed Scale  Weight: 52.1 kg (114 lb 13.8 oz)  Weight (lb): 114.86 lb  Ideal Body Weight (IBW), Female: 115 lb  % Ideal Body Weight, Female (lb): 93.75 lb  BMI (Calculated): 19.1  BMI Grade: 18.5-24.9 - normal     Lab/Procedures/Meds  Pertinent Labs Reviewed: reviewed  Pertinent Labs Comments: Na 135L, K 3.2L, Ca 8.3L  Pertinent Medications Reviewed: reviewed  Pertinent Medications Comments: aspirin, 5% dex at 75    Physical Findings/Assessment  Overall Physical Appearance: weak  Tubes:  (none)  Oral/Mouth Cavity: WDL  Skin:  (Charbel 18-intact)    Estimated/Assessed Needs  Weight Used For Calorie Calculations: 52.1 kg (114 lb 13.8 oz)  Energy Calorie Requirements (kcal): 0778-3528  Energy Need Method: Kcal/kg (30-35 kcal/kg)  Protein Requirements: 63g " (1.2g/kg)  Weight Used For Protein Calculations: 52.1 kg (114 lb 13.8 oz)  Fluid Need Method: RDA Method  RDA Method (mL): 1563     Nutrition Prescription Ordered  Current Diet Order: dysphagia soft    Evaluation of Received Nutrient/Fluid Intake  Other Calories (kcal): 306  Energy Calories Required: not meeting needs  Protein Required: not meeting needs  Fluid Required: not meeting needs  Comments: LBM 5/8  % Intake of Estimated Energy Needs: 0 - 25 %  % Meal Intake: 0 - 25 %    Nutrition Risk  Level of Risk/Frequency of Follow-up:  (2xweekly)     Assessment and Plan  Nutrition Problem  Inadequate energy intake    Related to (etiology):   Decreased appetite    Signs and Symptoms (as evidenced by):   Poor po intake    Interventions/Recommendations (treatment strategy):  See recs    Nutrition Diagnosis Status:   Continues     Monitor and Evaluation  Food and Nutrient Intake: food and beverage intake  Food and Nutrient Adminstration: diet order  Physical Activity and Function: nutrition-related ADLs and IADLs  Anthropometric Measurements: weight  Biochemical Data, Medical Tests and Procedures: electrolyte and renal panel  Nutrition-Focused Physical Findings: overall appearance     Nutrition Follow-Up  RD Follow-up?: Yes

## 2018-05-09 NOTE — PROGRESS NOTES
Pharmacist Intervention IV to PO Note    Elena Patton is a 77 y.o. female being treated with IV medication azithromycin    Patient Data:    Vital Signs (Most Recent):  Temp: 97.6 °F (36.4 °C) (05/09/18 1105)  Pulse: (!) 120 (05/09/18 1310)  Resp: (!) 21 (05/09/18 1310)  BP: (!) 146/83 (05/09/18 1300)  SpO2: 96 % (05/09/18 1310)   Vital Signs (72h Range):  Temp:  [96.4 °F (35.8 °C)-97.9 °F (36.6 °C)]   Pulse:  []   Resp:  [13-45]   BP: ()/()   SpO2:  [77 %-100 %]      CBC:    Recent Labs     Lab 05/07/18  1740 05/08/18  0405 05/09/18  0324   WBC 10.77 6.38 6.63   RBC 3.92* 3.64* 3.54*   HGB 11.6* 10.7* 10.4*   HCT 37.0 33.6* 32.9*    300 295   MCV 94 92 93   MCH 29.6 29.4 29.4   MCHC 31.4* 31.8* 31.6*     CMP:     Recent Labs     Lab 05/07/18  0349 05/08/18  0405 05/09/18  0324   GLU 94 98 96   CALCIUM 8.3* 8.1* 8.0*   ALBUMIN 1.6* 1.5* 1.5*   PROT 5.1* 4.7* 4.6*   * 136 137   K 3.2* 3.1* 3.4*   CO2 25 26 26   CL 99 103 105   BUN 12 10 10   CREATININE 0.7 0.7 0.7   ALKPHOS 90 81 81   ALT 9* 9* 7*   AST 14 13 12   BILITOT 0.5 0.4 0.3       Dietary Orders:  Diet Orders            Diet Dysphagia Soft (IDDSI Level 6) [facility]; Cardiac (Low Na/Chol); Thin: Dysphagia 3 (Mechanical Soft Chopped) starting at 05/05 1726            Based on the following criteria, this patient qualifies for intravenous to oral conversion:  [x] The patients gastrointestinal tract is functioning (tolerating medications via oral or enteral route for 24 hours and tolerating food or enteral feeds for 24 hours).  [x] The patient is hemodynamically stable for 24 hours (heart rate <100 beats per minute, systolic blood pressure >99 mm Hg, and respiratory rate <20 breaths per minute).  [x] The patient shows clinical improvement (afebrile for at least 24 hours and white blood cell count downtrending or normalized). Additionally, the patient must be non-neutropenic (absolute neutrophil count >500 cells/mm3).  [x]  For antimicrobials, the patient has received IV therapy for at least 24 hours.    IV medication azithromycin will be changed to oral medication azitrhomycin    Pharmacist's Name: Jnug Nieves  Pharmacist's Extension: [phone]

## 2018-05-09 NOTE — PLAN OF CARE
Problem: Physical Therapy Goal  Goal: Physical Therapy Goal  Goals to be met by: 2018     Patient will increase functional independence with mobility by performin. Supine to sit with Stand-by Assistance  2. Sit to supine with Stand-by Assistance  3. Sit to stand transfer with Stand-by Assistance  4. Gait  x 300 feet with Stand-by Assistance using Rolling Walker.         Outcome: Ongoing (interventions implemented as appropriate)  Continue working toward goals.

## 2018-05-09 NOTE — PLAN OF CARE
Problem: SLP Goal  Goal: SLP Goal  SLP Short Term Goals:  1. Patient will consume >50% dental soft diet with thin liquids with no overt s/s of aspiration given mod-max assist.-ongoing  2. Patient will implement safe swallowing strategies 100% of the time during meals given mod-max assist.     Outcome: Ongoing (interventions implemented as appropriate)  5/9: Pt seen this AM for dysphagia tx. Pt continues to tolerate thin liquids, puree, and dental soft textures with no overt s/s of aspiration. RN reports pt still with dcr'd participation in PO intake, pt only eating chicken noodle soup and crackers. SLP recs: Con't dental soft/thin liquids with cuing/coaxing to increase participation in PO intake and all other universal swallow precautions.  KAMAR Salcedo., CF-SLP  Speech-Language Pathologist

## 2018-05-09 NOTE — PROGRESS NOTES
Patient arms placed on pillows bilateral. Noted small amount of old dried blood on transparent gauze on right wrist PIV. Will change drainage later.  Noted patient with moderate amount of edema to left lower FA where her B/P cuff location is. Will continue to monitor.

## 2018-05-09 NOTE — PT/OT/SLP PROGRESS
Occupational Therapy   Treatment    Name: Elena Patton  MRN: [id number]  Admitting Diagnosis:  Bilateral pulmonary embolism       Recommendations:     Discharge Recommendations: nursing facility, skilled  Discharge Equipment Recommendations:  bedside commode  Barriers to discharge:  Decreased caregiver support, Inaccessible home environment    Subjective     Communicated with: nsg prior to session.  Pain/Comfort:  · Pain Rating 1:  (complaints of leg pains )    Patients cultural, spiritual, Jehovah's witness conflicts given the current situation:      Objective:     Patient found with:      General Precautions: Standard, fall   Orthopedic Precautions:N/A   Braces: N/A     Occupational Performance:    Bed Mobility:    · Patient completed Scooting/Bridging with minimum assistance  · Patient completed Supine to Sit with minimum assistance  · Patient completed Sit to Supine with moderate assistance and maximal assistance     Functional Mobility/Transfers:  · Patient completed Sit <> Stand Transfer with minimum assistance and moderate assistance  with  rolling walker   · Functional Mobility: Min/Mod A with posterior lean; lateral steps to HOB     Activities of Daily Living:  · LB Dressing: total assistance    · Toileting: total assistance marley     Patient left supine with all lines intact, call button in reach, bed alarm on and nsg notified    [facility] 6 Click:  [facility] Total Score: 14    Treatment & Education:  Pt performing supine LE/UE therex/ROm without added resistance   Bed mobility as listed above  Seated EOB- pt performing LE ROM- limited 2/2 increased HR and labored breathing- increased time to recover   Functional transitions from seated surfaces x 2 - posterior leaning and increased assist required for steps   Education:    Assessment:     Elena Patton is a 77 y.o. female with a medical diagnosis of Bilateral pulmonary embolism.  She presents with deconditioning.  Performance deficits affecting function are weakness, gait  instability, impaired balance, impaired endurance, impaired self care skills, impaired functional mobilty, edema, pain, impaired skin, decreased safety awareness, decreased lower extremity function, decreased upper extremity function, decreased ROM.  Pt progressing towards goals, however, tachy during session as well as lethargic from pain medication. Cont OT POC     Rehab Prognosis:  Good ; patient would benefit from acute skilled OT services to address these deficits and reach maximum level of function.       Plan:     Patient to be seen 5 x/week to address the above listed problems via self-care/home management, therapeutic activities, therapeutic exercises  · Plan of Care Expires: 06/05/18  · Plan of Care Reviewed with: patient    This Plan of care has been discussed with the patient who was involved in its development and understands and is in agreement with the identified goals and treatment plan    GOALS:    Occupational Therapy Goals        Problem: Occupational Therapy Goal    Goal Priority Disciplines Outcome Interventions   Occupational Therapy Goal     OT, PT/OT Ongoing (interventions implemented as appropriate)    Description:  Goals to be met by: 6/5/18     Patient will increase functional independence with ADLs by performing:    LE Dressing with Minimal Assistance.  Grooming while standing with Contact Guard Assistance.  Toileting from bedside commode with Contact Guard Assistance for hygiene and clothing management.   Supine to sit with Contact Guard Assistance.  Stand pivot transfers with Contact Guard Assistance.  Toilet transfer to bedside commode with Contact Guard Assistance.  Increased functional strength to WFL for self care skills and functional mobility.  Upper extremity exercise program x10 reps per handout, with independence.                      Time Tracking:     OT Date of Treatment: 05/09/18  OT Start Time: 1419  OT Stop Time: 1447  OT Total Time (min): 28 min    Billable  Minutes:Therapeutic Activity 14 co treatment with PT     Lucy Sanchez OT  5/9/2018

## 2018-05-09 NOTE — PLAN OF CARE
Problem: Occupational Therapy Goal  Goal: Occupational Therapy Goal  Goals to be met by: 6/5/18     Patient will increase functional independence with ADLs by performing:    LE Dressing with Minimal Assistance.  Grooming while standing with Contact Guard Assistance.  Toileting from bedside commode with Contact Guard Assistance for hygiene and clothing management.   Supine to sit with Contact Guard Assistance.  Stand pivot transfers with Contact Guard Assistance.  Toilet transfer to bedside commode with Contact Guard Assistance.  Increased functional strength to [facility] for self care skills and functional mobility.  Upper extremity exercise program x10 reps per handout, with independence.     Outcome: Ongoing (interventions implemented as appropriate)  Pt progressing towards goals, however, tachy during session as well as lethargic from pain medication. Cont OT POC

## 2018-05-09 NOTE — ASSESSMENT & PLAN NOTE
-echo with severely depressed LV function with EF 10% with moderate MR and TR  -no volume overload on exam currently  -feel she needs additional fluids given orthostasis this AM  -recommend IVF at 75-100cc/hr and will avoid boluses for now  -monitor fluid volume status closely with strict I&Os and daily weights

## 2018-05-09 NOTE — CONSULTS
[facility]  Cardiology  Consult Note    Patient Name: Elena Patton  MRN: [id number]  Admission Date: 5/3/2018  Hospital Length of Stay: 5 days  Code Status: Full Code   Attending Provider: Riky Santos III, MD   Consulting Provider: VIKKI Saavedra, ANP  Primary Care Physician: Gael Chao MD  Principal Problem:Bilateral pulmonary embolism    Patient information was obtained from patient and past medical records.     Inpatient consult to [facility]  Consult performed by: TYRONE SEGUNDO  Consult ordered by: MICKEY GONZÁLES  Reason for consult: hypotension         Subjective:     Chief Complaint:  SOB and chest pain      HPI:   76 yo female with HFrEF 45%, chronic AF not on OAC 2/2 falls, hx of ESBL UTI's, and recent admission for PNA. Patient presented from [facility] with left sided pleuritic chest pain and SOB.   CTA revealed extensive bilateral pulmonary embolisms with evidence of right heart strain and pulmonary infarcts.   She is hypotensive and tachycardic. WBC ct elevated but afebrile. Patient placed on heparin gtt and feeling better this AM. Satting well on 2LNC. Bedside 2DE with evidence of moderate right heart strain (unofficial read). US of BLE pending. BNP elevated at 415. HH 11/36. No history of bleeding. Cr normal. No known allergies. She is admitted to [facility]. Cardiology and ID consulted.   NPO for catheter directed thrombolysis this morning.     Hospital Course  05/04/2018 Per HPI.     5/5/2018:Overnight no acute events. S/p EKOS catheter directed thrombolysis with improvement in PASP 50-30. Feels better. Hemodynamically stable. On heparin gtt currently.     5/6/2018: Overnight no acute events. S/p thoracentesis this AM and hypotensive. Breathing fine.    05/07/2018 Hypotension persists. BB held. AF on tele with HR 90s-110s. Remains on heparin gtt. Doing well from cardiac perspective.     5/9/2018 Reconsulted due to hypotension with positive  orthostatics. OOB this AM with BP prior to standing 140/103 with weakness after standing. BP sitting 100/60. Given IVF overnight x 12 hours. HR currently 120s with stable BP while lying. Complains of right leg pain     Past Medical History:   Diagnosis Date    Anticoagulant long-term use     Atrial fibrillation     Coronary artery disease     Hypertension     Renal disorder     Thyroid disease        Past Surgical History:   Procedure Laterality Date    APPENDECTOMY      CARDIAC SURGERY      HYSTERECTOMY      JOINT REPLACEMENT         Review of patient's allergies indicates:  No Known Allergies    No current facility-administered medications on file prior to encounter.      Current Outpatient Prescriptions on File Prior to Encounter   Medication Sig    amiodarone (PACERONE) 200 MG Tab Take by mouth once daily.    amitriptyline (ELAVIL) 10 MG tablet Take 10 mg by mouth 2 (two) times daily.    aspirin 325 MG tablet Take 1 tablet (325 mg total) by mouth once daily.    benzonatate (TESSALON) 100 MG capsule Take 100 mg by mouth 3 (three) times daily as needed for Cough.    cetirizine (ZYRTEC) 10 MG tablet Take 10 mg by mouth once daily.    diltiaZEM (CARDIZEM) 30 MG tablet Take 1 tablet (30 mg total) by mouth every 6 (six) hours.    DULoxetine (CYMBALTA) 30 MG capsule Take 30 mg by mouth once daily.    FERROUS SULFATE, BULK, MISC by Misc.(Non-Drug; Combo Route) route.    furosemide (LASIX) 40 MG tablet Take 1 tablet (40 mg total) by mouth once daily.    gabapentin (NEURONTIN) 600 MG tablet Take 600 mg by mouth 3 (three) times daily.    ipratropium (ATROVENT) 42 mcg (0.06 %) nasal spray 2 sprays by Nasal route 3 (three) times daily as needed for Rhinitis.    Lactobacillus acidoph-L.bulgar 1 million cell Chew Take 4 tablets by mouth 3 (three) times daily with meals.    levothyroxine (SYNTHROID) 25 MCG tablet Take 25 mcg by mouth once daily.    lubiprostone (AMITIZA) 24 MCG Cap Take 24 mcg by mouth 2  (two) times daily with meals.    multivitamin (THERAGRAN) tablet Take 1 tablet by mouth once daily.    oxyCODONE-acetaminophen (PERCOCET) 5-325 mg per tablet Take 1 tablet by mouth every 6 (six) hours as needed for Pain.    promethazine (PHENERGAN) 25 MG tablet Take 25 mg by mouth every 6 (six) hours as needed for Nausea.     Family History     None        Social History Main Topics    Smoking status: Never Smoker    Smokeless tobacco: Never Used    Alcohol use No    Drug use: No    Sexual activity: Not on file     Review of Systems   Constitution: Negative for chills, decreased appetite, diaphoresis, fever and weakness.   Cardiovascular: Negative for chest pain, claudication, cyanosis, dyspnea on exertion, irregular heartbeat, leg swelling, near-syncope, orthopnea, palpitations, paroxysmal nocturnal dyspnea and syncope.   Respiratory: Negative for cough, hemoptysis, shortness of breath and wheezing.    Gastrointestinal: Negative for bloating, abdominal pain, constipation, diarrhea, melena, nausea and vomiting.   Neurological: Negative for dizziness.     Objective:     Vital Signs (Most Recent):  Temp: 97.3 °F (36.3 °C) (05/09/18 0705)  Pulse: (!) 116 (05/09/18 1100)  Resp: (!) 24 (05/09/18 1100)  BP: (!) 145/91 (05/09/18 1100)  SpO2: (!) 94 % (05/09/18 1100) Vital Signs (24h Range):  Temp:  [97.3 °F (36.3 °C)-97.9 °F (36.6 °C)] 97.3 °F (36.3 °C)  Pulse:  [] 116  Resp:  [13-45] 24  SpO2:  [77 %-100 %] 94 %  BP: ()/() 145/91     Weight: 52.1 kg (114 lb 13.8 oz)  Body mass index is 20.35 kg/m².    SpO2: (!) 94 %  O2 Device (Oxygen Therapy): nasal cannula      Intake/Output Summary (Last 24 hours) at 05/09/18 1209  Last data filed at 05/09/18 1100   Gross per 24 hour   Intake          1977.23 ml   Output             1250 ml   Net           727.23 ml       Lines/Drains/Airways     Drain                 Urethral Catheter 05/04/18 0403 14 Fr. 5 days          Peripheral Intravenous Line                  Peripheral IV - Single Lumen 05/03/18 2052 Right Wrist 5 days         Peripheral IV - Single Lumen 05/07/18 1700 Right Forearm 1 day                Physical Exam   Constitutional: She appears cachectic.   Cardiovascular: An irregularly irregular rhythm present. Tachycardia present.    Pulmonary/Chest: Effort normal and breath sounds normal.   Musculoskeletal: She exhibits edema (1+ BLE edema present ).   Neurological: She is alert.       Significant Labs:       Recent Labs  Lab 05/09/18  0324      K 3.4*      CO2 26   BUN 10   CREATININE 0.7       Recent Labs  Lab 05/09/18  0324   WBC 6.63   RBC 3.54*   HGB 10.4*   HCT 32.9*      MCV 93   MCH 29.4   MCHC 31.6*       Significant Imaging: Echocardiogram:   2D echo with color flow doppler:   Results for orders placed or performed during the hospital encounter of 05/03/18   2D echo with color flow doppler   Result Value Ref Range    EF 10 (A) 55 - 65    Mitral Valve Regurgitation SEVERE (A)     Aortic Valve Stenosis MILD (A)     Est. PA Systolic Pressure 29.42     Pericardial Effusion NONE     Tricuspid Valve Regurgitation MODERATE (A)      Assessment and Plan:     * Bilateral pulmonary embolism    -CTA chest with extensive filling defects throughout the pulmonary tree involving the segmental vessels to the right upper, right lower, left upper and left lower lobes.  Bowing of the interventricular septum to the left with dilatation of the right-sided chambers noted. Evidence of LLL infarction noted.  -treated with EKOS catheter directed TPA with improvement on PA pressures  -on IV Heparin due to need for thoracentesis post procedure  -recommend initiation of Eliquis if no further procedures needed; Eliquis 10mg po BID x 7 days then decrease to 5mg po BID            Acute deep vein thrombosis (DVT) of both lower extremities    -DVT upon admission with nonocclusive thrombus seen within the right common femoral vein, right proximal superficial  femoral vein and occlusive thrombus left peroneal vein.  -on IV Heparin with varying therapeutic PTTs  -repeat BLE venous ultrasound per primary team  -recommend transition to Eliquis if no further procedures needed and stopping Heparin once Eliquis started         Chronic combined systolic and diastolic heart failure    -echo with severely depressed LV function with EF 10% with moderate MR and TR  -no volume overload on exam currently  -feel she needs additional fluids given orthostasis this AM  -recommend IVF at 75-100cc/hr and will avoid boluses for now  -monitor fluid volume status closely with strict I&Os and daily weights           Essential hypertension    -orthostatic this AM likely related to hypovolemia  -hold antihypertensives         Atrial fibrillation with RVR    -on CCB at home held due to marginal BP upon presentation l   -HR up to 120s this AM likely to volume depletion  -on IV Heparin; recommend transition to Eliquis for PE treatment if no further procedures anticipated             VTE Risk Mitigation         Ordered     heparin 25,000 units in dextrose 5% 250 mL (100 units/mL) infusion; FEMALE  Continuous      05/04/18 0334     heparin 25,000 units in dextrose 5% 250 mL (100 units/mL) bolus from bag; PRN BOLUS  As needed (PRN)      05/04/18 0334     heparin 25,000 units in dextrose 5% 250 mL (100 units/mL) bolus from bag; PRN BOLUS  As needed (PRN)      05/04/18 0334     IP VTE HIGH RISK PATIENT  Once      05/04/18 0322          Thank you for your consult. I will follow-up with patient. Please contact us if you have any additional questions.    Jaymie Vyas, APRN, ANP  Cardiology   [facility]

## 2018-05-09 NOTE — ASSESSMENT & PLAN NOTE
-DVT upon admission with nonocclusive thrombus seen within the right common femoral vein, right proximal superficial femoral vein and occlusive thrombus left peroneal vein.  -on IV Heparin with varying therapeutic PTTs  -repeat BLE venous ultrasound per primary team  -recommend transition to Eliquis if no further procedures needed and stopping Heparin once Eliquis started

## 2018-05-09 NOTE — PROGRESS NOTES
Progress Note  [facility]    Admit Date: 5/3/2018   LOS: 5 days     SUBJECTIVE:     Follow-up For:  Extensive bilateral PE and R-sided pleural effusion     Patient interviewed and examined this morning. Still very sleepy during interview, but awakens to verbal stimuli.  UOP 1240 cc in last 24 hrs, net + 1.6 L since admit. Patient is complaining of severe right leg pain (mid-calf) which is worse than her baseline neuropathy.       Scheduled Meds:   albuterol-ipratropium 2.5mg-0.5mg/3mL  3 mL Nebulization Q6H    amiodarone  200 mg Oral BID    aspirin  325 mg Oral Daily    azithromycin  500 mg Oral Daily    cetirizine  10 mg Oral Daily    DULoxetine  30 mg Oral Daily    gabapentin  600 mg Oral TID    levothyroxine  25 mcg Oral Before breakfast    lidocaine  1 patch Transdermal Q24H    lubiprostone  24 mcg Oral BID WM    pantoprazole 40 mg in dextrose 5 % 100 mL infusion (ready to mix system)  40 mg Intravenous Daily     Continuous Infusions:   heparin (porcine) in D5W 20 Units/kg/hr (05/09/18 0542)     PRN Meds:acetaminophen, benzonatate, bisacodyl, heparin (PORCINE), heparin (PORCINE), hydrOXYzine HCl, ibuprofen, sodium chloride 0.9%    Review of patient's allergies indicates:  No Known Allergies    Review of Systems  Pos: chronic back pain  Neg: CP, SOB    OBJECTIVE:     Vital Signs (Most Recent)  Temp: 97.6 °F (36.4 °C) (05/09/18 1105)  Pulse: (!) 120 (05/09/18 1310)  Resp: (!) 21 (05/09/18 1310)  BP: (!) 146/83 (05/09/18 1300)  SpO2: 96 % (05/09/18 1310)    Vital Signs Range (Last 24H):  Temp:  [97.3 °F (36.3 °C)-97.9 °F (36.6 °C)]   Pulse:  []   Resp:  [13-45]   BP: ()/()   SpO2:  [77 %-100 %]     I & O (Last 24H):    Intake/Output Summary (Last 24 hours) at 05/09/18 1417  Last data filed at 05/09/18 1400   Gross per 24 hour   Intake          1974.23 ml   Output             1170 ml   Net           804.23 ml     Wt Readings from Last 3 Encounters:   05/08/18 52.1 kg (114 lb  13.8 oz)   04/26/18 48.8 kg (107 lb 9.4 oz)   12/11/17 49 kg (108 lb)     Physical Exam:  Gen: NAD  HEENT: NC, AT  Chest: decreased breath sounds b/l, worse on R lower lung field, and mild bibasilar rales b/l   CVS: irregular irregular rhythm, no m/r/g  Abdomen: soft, NT, ND, no masses, +BS  Ext: no edema, pulses 2+   Skin: ro rashes  Neuro: A&O x 3, CN's grossly intact, sensation intact to light touch  Psych: Normal mood, affect, thought content    Laboratory:  LABS  CBC    Recent Labs  Lab 05/07/18  1740 05/08/18  0405 05/09/18  0324   WBC 10.77 6.38 6.63   RBC 3.92* 3.64* 3.54*   HGB 11.6* 10.7* 10.4*   HCT 37.0 33.6* 32.9*    300 295   MCV 94 92 93   MCH 29.6 29.4 29.4   MCHC 31.4* 31.8* 31.6*     BMP    Recent Labs  Lab 05/07/18  0349 05/08/18  0405 05/09/18  0324   * 136 137   K 3.2* 3.1* 3.4*   CO2 25 26 26   CL 99 103 105   BUN 12 10 10   CREATININE 0.7 0.7 0.7   GLU 94 98 96         Recent Labs  Lab 05/04/18  0401  05/07/18  0349 05/08/18  0405 05/09/18  0324   CALCIUM 8.0*  < > 8.3* 8.1* 8.0*   MG 1.8  --   --  1.6  --    PHOS 3.4  --   --  3.1  --    < > = values in this interval not displayed.  LFT    Recent Labs  Lab 05/07/18  0349 05/08/18  0405 05/09/18  0324   PROT 5.1* 4.7* 4.6*   ALBUMIN 1.6* 1.5* 1.5*   BILITOT 0.5 0.4 0.3   AST 14 13 12   ALKPHOS 90 81 81   ALT 9* 9* 7*       COAGS    Recent Labs  Lab 05/04/18  0058  05/08/18  0405 05/09/18  0324 05/09/18  1149   INR 1.1  --   --   --   --    APTT 28.1  < > 49.7* 69.6* 51.2*   < > = values in this interval not displayed.  CE    Recent Labs  Lab 05/03/18 2114   TROPONINI 0.010     BNP    Recent Labs  Lab 05/03/18 2114   *       LAST HbA1c  Lab Results   Component Value Date    HGBA1C 4.8 04/19/2018         Diagnostic Results:    IP CONSULT TO CARDIOLOGY  IP CONSULT TO PULMONOLOGY  IP CONSULT TO CARDIOLOGY-[facility]    ASSESSMENT/PLAN:     Neuro/Psych:  · A&O x 3  · Avoid sedating pain meds if possible- percocet dose yesterday  appeared to cause worsening of condition  · Pt is on cymbalta and amytriptiline. Stopped  Amytriptiline as this can lead to orthostatic hypotension.     Cardiovascular: (Hypertension, Chronic atrial fibrillation w/ hx of RVR)   · Home meds include amiodarone and diltiazem.     Stopped diltiazem and continue Amio  · ECHO 4/2018 w/ EF 45%, grade 3 diastolic dysfunction    ECHO 5/2018: EF 10%, severe MR, mod TR  · Right heart strain evident on CTA  · Pt with pacemaker   Symptomatic orthostatic hypotension persists despite several fluid boluses   Cardiology was re-consulted and feels the orthostatic hypotension is 2/2 needing fluids   Started MIVF's at 75 cc/hr x 6 hrs and reassess.     Pulmonary: (Bilat Pulm Embolisms, ?Pneumonia)   · DuoNeb q 6 hours scheduled  · ABx: deescalated to Azithromycin IV   Moderate right-sided pleural effusion drained by Pulm, removed 2L   Maintaining O2 saturations on 2L NC    GI/NUT:   · Cardiac diet with thin liquids per nutritionist   Will add Boost Plus with all meals   Albumin 1.8  · GI Proph -- Protonix 40 mg IV daily while NPO     Renal/Electrolytes:   · UOP 1240 cc in last 24 hrs, net + 1.6L since admit   · Rodriguez resumed due to hx of urinary retention    Will get [facility] Nephro recs for help with fluids    Infectious Disease: (HAP)   ·  Pt met sepsis criteria with HR, WBC and source  ·  Concern for recurrent RLL PNA, currently treating with Azithromycin (deescalated)  · S/p 30 cc/kg of fluid  · Leukocytosis improving: WBC now normal at 6.6  · Blood cultures NGTD, urine cultures growing candida, asymptomatic   will hold of on diflucan for now as can cause QT prolongation and patient is already on amiodarone    Pt with hx of ESBL UTI    Hem/Coag: (Anemia, DVT Proph)   · Current Hgb =       · 10.4  ·  H&H has been stable  ·  On IV Heparin GGT for bilat pulm embolism with protocol for checking  PTT  ·  Cards on board; s/p cath lab thrombolysis     Cards recommending long-term AC with  eliquis at discharge or when no other procedures planned  ·  DVT Proph - Heparin for now     Endocrine:   · Conservative glycemic control, goal < 180 mg/dl     Musculoskeletal/Skin:   · Sacral erythema  · Repositioning per ICU protocol    Chronic back and b/l foot pain with Hx of neuropathy on percocet 5 mg and gabapentin at home   Restarted gabapentin but giving percocet sparingly   F/u US of RLE to look for possible DVT in setting of recent PE     Dispo: F/u Orthostatic BP, f/u imaging, f/u Nephro recs    5/9/2018 Tushar Cho MD  7:43 AM

## 2018-05-09 NOTE — PLAN OF CARE
[facility] faxed updated clinicals via [facility] to [facility].    Fabiola Dowling RN  Transition Navigator  [phone]

## 2018-05-09 NOTE — ASSESSMENT & PLAN NOTE
-on CCB at home held due to marginal BP upon presentation l   -HR up to 120s this AM likely to volume depletion  -on IV Heparin; recommend transition to Eliquis for PE treatment if no further procedures anticipated

## 2018-05-09 NOTE — PLAN OF CARE
Problem: Physical Therapy Goal  Goal: Physical Therapy Goal  Goals to be met by: 2018     Patient will increase functional independence with mobility by performin. Supine to sit with Stand-by Assistance  2. Sit to supine with Stand-by Assistance  3. Sit to stand transfer with Stand-by Assistance  4. Gait  x 300 feet with Stand-by Assistance using Rolling Walker.         Outcome: Ongoing (interventions implemented as appropriate)  Patient with limited tolerance to activity; HR elevated into the 120s at rest and 130s with movement; will cont with POC.

## 2018-05-09 NOTE — PT/OT/SLP PROGRESS
"Physical Therapy Treatment    Patient Name:  Elena Patton   MRN:  [id number]    Recommendations:     Discharge Recommendations:  nursing facility, skilled   Discharge Equipment Recommendations: bedside commode   Barriers to discharge: orthostatic hypotension leading to decreased activity tolerance    Assessment:     Elena Patton is a 77 y.o. female admitted with a medical diagnosis of Bilateral pulmonary embolism.  She presents with the following impairments/functional limitations:  weakness, impaired endurance, impaired self care skills, impaired functional mobilty, gait instability, impaired balance, decreased coordination, decreased upper extremity function, decreased lower extremity function, decreased safety awareness, pain, impaired cardiopulmonary response to activity.  Pt requires increased assistance for standing and transfers secondary to posterior lean, decreased strength, and endurance.  Pt would continue to benefit from P.T. To address impairments listed below.    Rehab Prognosis:  Fair+; patient would benefit from acute skilled PT services to address these deficits and reach maximum level of function.      Recent Surgery: Procedure(s) (LRB):  Venogram (N/A)      Plan:     During this hospitalization, patient to be seen 6 x/week to address the above listed problems via gait training, therapeutic activities, therapeutic exercises  · Plan of Care Expires:  06/05/18   Plan of Care Reviewed with: patient    Subjective     Communicated with RN (Ilda) prior to session.  Patient found sitting up in b/s chair upon PT entry to room, agreeable to treatment.        Patient comments/goals: Pt agreed to tx.  "I think I have to have a BM."  Pain/Comfort:  · Pain Rating 1:  (c/o pain "all over."  Did not rate.)  · Pain Addressed 1: Nurse notified, Reposition, Distraction  · Pain Rating Post-Intervention 1:  (as above)    Patients cultural, spiritual, Protestant conflicts given the current situation: none    Objective: "     Patient found with: oxygen, marley catheter, peripheral IV (full ICU monitoring)     General Precautions: Standard, fall   Orthopedic Precautions:N/A   Braces:       Functional Mobility:  · Bed Mobility:     · Rolling Left:  contact guard assistance  · Scooting: minimum assistance and moderate assistance up to HOB using BLEs  · Sit to Supine: moderate assistance and maximal assistance for BLEs and assist with trunk  · Transfers:     · Sit to Stand:  maximal assistance with rolling walker x 4 reps  · Toilet Transfer: maximal assistance with rolling walker  using  Step Transfer.  Increased assistance secondary to RW management and balance, posterior lean.  · Gait: 2 philomena steps with RW and Max A to transfer B/S chair to b/s commode. 3 small steps b/s commode to EOB with RW and Max A. 2 side steps up to HOB x 2 reps with seated rest break between all bouts of gait. Pt requires assistance secondary to significant posterior lean and for RW management. Pt's knees buckled without warned last two bouts of stepping up to HOB.  · Balance: sitting fair/fair+, standing with RW poor, gait with RW poor.      AM-PAC 6 CLICK MOBILITY  Turning over in bed (including adjusting bedclothes, sheets and blankets)?: 3  Sitting down on and standing up from a chair with arms (e.g., wheelchair, bedside commode, etc.): 2  Moving from lying on back to sitting on the side of the bed?: 3  Moving to and from a bed to a chair (including a wheelchair)?: 2  Need to walk in hospital room?: 1  Climbing 3-5 steps with a railing?: 1  Total Score: 12       Therapeutic Activities and Exercises:   Supine BLE AAROM all available planes x 10 to 15 reps.  Static standing with RW to assist pt with cleaning with Mod/Max A.  Pt's posterior lean increases when pt begins taking steps.    Patient left supine with all lines intact, call button in reach, bed alarm on and RN notified..    GOALS:    Physical Therapy Goals        Problem: Physical Therapy Goal     Goal Priority Disciplines Outcome Goal Variances Interventions   Physical Therapy Goal     PT/OT, PT Ongoing (interventions implemented as appropriate)     Description:  Goals to be met by: 2018     Patient will increase functional independence with mobility by performin. Supine to sit with Stand-by Assistance  2. Sit to supine with Stand-by Assistance  3. Sit to stand transfer with Stand-by Assistance  4. Gait  x 300 feet with Stand-by Assistance using Rolling Walker.                          Time Tracking:     PT Received On:    PT Start Time: 1317     PT Stop Time: 1348  PT Total Time (min): 31 min     Billable Minutes: Therapeutic Activity 22 and Therapeutic Exercise 9    Treatment Type: Treatment  PT/PTA: PTA     PTA Visit Number: 1     Ashley Guadarrama PTA  2018

## 2018-05-09 NOTE — PT/OT/SLP PROGRESS
Physical Therapy Treatment    Patient Name:  Elena Patton   MRN:  [id number]    Recommendations:     Discharge Recommendations:  nursing facility, skilled   Discharge Equipment Recommendations: bedside commode   Barriers to discharge: Inaccessible home and Decreased caregiver support    Assessment:     Elena Patton is a 77 y.o. female admitted with a medical diagnosis of Bilateral pulmonary embolism.  She presents with the following impairments/functional limitations:  weakness, impaired endurance, impaired self care skills, impaired functional mobilty, gait instability, impaired balance, decreased lower extremity function, decreased upper extremity function, decreased ROM, decreased safety awareness, pain, edema Patient with limited tolerance to activity; HR elevated into the 120s at rest and 130s with movement; will cont with POC..    Rehab Prognosis:  good; patient would benefit from acute skilled PT services to address these deficits and reach maximum level of function.      Recent Surgery: Procedure(s) (LRB):  Venogram (N/A)      Plan:     During this hospitalization, patient to be seen 6 x/week to address the above listed problems via gait training, therapeutic activities, therapeutic exercises  · Plan of Care Expires:  06/05/18   Plan of Care Reviewed with: patient    Subjective     Communicated with nurse  prior to session.  Patient found HOB elevated in supine upon PT entry to room, agreeable to treatment.      Pain/Comfort:  · Pain Rating 1:  (c/o leg pains)  · Location - Side 1: Bilateral  · Location - Orientation 1: generalized  · Location 1: leg  · Pain Addressed 1: Reposition, Nurse notified    Patients cultural, spiritual, Episcopal conflicts given the current situation: none    Objective:     Patient found with: oxygen, marley catheter, peripheral IV (full ICU monitoring)     General Precautions: Standard, fall   Orthopedic Precautions:N/A   Braces: N/A     Functional Mobility:  · Bed Mobility:      · Scooting: minimum assistance to EOB in sitting; maxA x 2 with drawsheet toward HOB  · Supine to Sit: minimum assistance  · Sit to Supine: moderate assistance and maximal assistance  · Transfers:     · Sit to Stand:  minimum assistance and moderate assistance with rolling walker  · Gait: Patient amb with min/modA 2/2 posterior lean taking lateral steps to HOB with slow meg and short step length      AM-PAC 6 CLICK MOBILITY  Turning over in bed (including adjusting bedclothes, sheets and blankets)?: 3  Sitting down on and standing up from a chair with arms (e.g., wheelchair, bedside commode, etc.): 3  Moving from lying on back to sitting on the side of the bed?: 3  Moving to and from a bed to a chair (including a wheelchair)?: 3  Need to walk in hospital room?: 1  Climbing 3-5 steps with a railing?: 1  Total Score: 14       Therapeutic Activities and Exercises:   Patient performed BUE/LE AROM ex prior to moving to EOB as above; seated EOB and performed LE AROM ex with increased HR and labored breathing-encouraged pursed lip breathing with increased time to recover; sit<>stand from elevated surface x 2 with posterior lean and increased assist for step taking as described above.    Patient left HOB elevated with all lines intact, call button in reach, bed alarm on and nurse notified..    GOALS:    Physical Therapy Goals        Problem: Physical Therapy Goal    Goal Priority Disciplines Outcome Goal Variances Interventions   Physical Therapy Goal     PT/OT, PT Ongoing (interventions implemented as appropriate)     Description:  Goals to be met by: 2018     Patient will increase functional independence with mobility by performin. Supine to sit with Stand-by Assistance  2. Sit to supine with Stand-by Assistance  3. Sit to stand transfer with Stand-by Assistance  4. Gait  x 300 feet with Stand-by Assistance using Rolling Walker.                 Problem: Physical Therapy Goal    Goal Priority Disciplines  Outcome Goal Variances Interventions   Physical Therapy Goal     PT/OT, PT                      Time Tracking:     PT Received On: 05/09/18  PT Start Time: 1419     PT Stop Time: 1444  PT Total Time (min): 25 min with OT    Billable Minutes: Therapeutic Activity 11 minutes    Treatment Type: Treatment  PT/PTA: PT     PTA Visit Number: 0     Avril Velez, PT  05/09/2018

## 2018-05-09 NOTE — PLAN OF CARE
Problem: Patient Care Overview  Goal: Plan of Care Review  Outcome: Ongoing (interventions implemented as appropriate)  Pt's SpO2 97% on room air. No adverse reactions to aerosol tx. No respiratory distress noted. Continue to monitor SpO2.

## 2018-05-09 NOTE — PLAN OF CARE
Problem: Nutrition, Imbalanced: Inadequate Oral Intake (Adult)  Goal: Identify Related Risk Factors and Signs and Symptoms  Related risk factors and signs and symptoms are identified upon initiation of Human Response Clinical Practice Guideline (CPG)  Outcome: Ongoing (interventions implemented as appropriate)    Recommendation/Intervention:   1. Add Boost Plus bid.   2. Continue to encourage intake at meals.   3. If po intake does not improve, pt may require nutritional support    Goals:   Pt will consume at least 50% intake at meals  Nutrition Goal Status:  (continues)  Communication of RD Recs: reviewed with RN (Ilda)

## 2018-05-09 NOTE — PROGRESS NOTES
Received care on patient, lying in bed resting POC discussed with patient. She is interested in warming her soup and getting more crackers. All lines are patent she currently have Heparin infusing at ordered rate, alone with Primary IVF's. Rodriguez cath patent and free flowing.

## 2018-05-09 NOTE — PT/OT/SLP PROGRESS
"Speech Language Pathology Treatment    Patient Name:  Elena Patton   MRN:  [id number]  Admitting Diagnosis: Bilateral pulmonary embolism    Recommendations:                 General Recommendations:  Dysphagia therapy  Diet recommendations:  Dental Soft, Chopped meat, Liquid Diet Level: Thin   Aspiration Precautions: Cuing/coaxing as needed to increase participation in PO intake, 1 bite/sip at a time, Alternating bites/sips, Feed only when awake/alert, Frequent oral care, HOB to 90 degrees, Meds whole 1 at a time, Remain upright 30 minutes post meal and Small bites/sips   General Precautions: Standard, fall  Communication strategies:  none    Subjective     Pt awake, alert and oriented x4. Pt agreed to participate in dysphagia tx. RN reported pt with continued dcr'd participation in PO intake, pt only eating chicken noodle soup and crackers.    Patient goals: "I eat the soup and crackers" per pt     Pain/Comfort:  · Pain Rating 1:  (c/o pain all over legs)  · Location - Side 1: Bilateral  · Location - Orientation 1: generalized  · Location 1: leg  · Pain Addressed 1: Nurse notified, Reposition    Objective:     Has the patient been evaluated by SLP for swallowing?   Yes  Keep patient NPO? No   Current Respiratory Status: room air      Pt seen this AM for dysphagia tx. Pt continues to tolerate thin liquids via cup sips x6, puree x3, and dental soft textures x3 with no overt s/s of aspiration.  Pt required mod-max cuing from SLP to participate in PO trials. SLP also educated pt on importance of increasing participation in PO intake. Pt acknowledged and confirmed understanding. However, pt will require frequent redirection and education to increase PO intake.      Assessment:     Elena Patton is a 77 y.o. female with an SLP diagnosis of Dysphagia.  She continues to present with dcr'd PO intake, per RN. Pt demonstrated no overt s/s of aspiration with limited PO trials.  Pt will require frequent cuing/coaxing to increase PO " intake.  SLP recs: Con't dental soft/thin liquids with cuing/coaxing to increase participation in PO intake and all other universal swallow precautions.    Goals:    SLP Goals        Problem: SLP Goal    Goal Priority Disciplines Outcome   SLP Goal     SLP Ongoing (interventions implemented as appropriate)   Description:  SLP Short Term Goals:  1. Patient will consume >50% dental soft diet with thin liquids with no overt s/s of aspiration given mod-max assist.-ongoing  2. Patient will implement safe swallowing strategies 100% of the time during meals given mod-max assist.                      Plan:     · Patient to be seen:  3 x/week   · Plan of Care expires:  06/03/18  · Plan of Care reviewed with:  patient (CASSIDY Gonsales)   · SLP Follow-Up:  Yes       Discharge recommendations:  nursing facility, skilled     Time Tracking:     SLP Treatment Date:   05/09/18  Speech Start Time:  0947  Speech Stop Time:  0957     Speech Total Time (min):  10 min    Billable Minutes: Treatment Swallowing Dysfunction 10    LIZETTE Salcedo, CF-SLP  Speech-Language Pathologist   5/9/2018

## 2018-05-09 NOTE — PLAN OF CARE
TN went to meet with patient, no family at bedside.  TN reviewed patient's clinicals and faxed updated notes to [facility] via [facility]. TN will continue to follow.    Future Appointments  Date Time Provider Department Center   5/14/2018 10:40 AM Zach Corona MD PhD [facility] [place]   5/22/2018 2:00 PM Y'Adenike Tuttle NP [facility] [place]        05/09/18 1125   Discharge Reassessment   Assessment Type Discharge Planning Reassessment   Provided patient/caregiver education on the expected discharge date and the discharge plan No   Discharge Plan A Skilled Nursing Facility   Discharge Plan B New Nursing Home placement - [facility] care facility   Patient choice form signed by patient/caregiver N/A   Describe the patient's ability to walk at the present time. Walks with the help of equipment     Fabiola Dowling RN  Transition Navigator  [phone]

## 2018-05-09 NOTE — PROGRESS NOTES
Patient is calling out very frequently, even after being settled with medication, water, and call bell within reach. She is requesting her soup be warmed and crackers.  2220 Soup is warmed with crackers x3 at bedside.

## 2018-05-10 NOTE — PROGRESS NOTES
Team called and informed them on the patient U/O remain very low in spite of me milking the tube. It is clear and without sediments noted.

## 2018-05-10 NOTE — PLAN OF CARE
Problem: Patient Care Overview  Goal: Plan of Care Review  No respiratory distress noted. Will cont to monitor

## 2018-05-10 NOTE — PROGRESS NOTES
Patient called out again asking about her pain medication. I  Informed her that she will get her anxiety medication and when it is time for her pain meds I will give it too rustam. She verbalized  Understanding.

## 2018-05-10 NOTE — PROGRESS NOTES
[facility]  Cardiology  Progress Note    Patient Name: Elena Patton  MRN: [id number]  Admission Date: 5/3/2018  Hospital Length of Stay: 6 days  Code Status: Full Code   Attending Physician: Riky Santos III, MD   Primary Care Physician: Gael Chao MD  Expected Discharge Date:   Principal Problem:Bilateral pulmonary embolism    Subjective:     Hospital Course:   05/04/2018 Per HPI.     5/5/2018:Overnight no acute events. S/p EKOS catheter directed thrombolysis with improvement in PASP 50-30. Feels better. Hemodynamically stable. On heparin gtt currently.     5/6/2018: Overnight no acute events. S/p thoracentesis this AM and hypotensive. Breathing fine.    05/07/2018 Hypotension persists. BB held. AF on tele with HR 90s-110s. Remains on heparin gtt. Doing well from cardiac perspective.     5/9/2018 Reconsulted due to hypotension with positive orthostatics. OOB this AM with BP prior to standing 140/103 with weakness after standing. BP sitting 100/60. Given IVF overnight x 12 hours. HR currently 120s with stable BP while lying. Complains of right leg pain     5/10/2018 On IVF overnight. Resting with eyes closed this AM. Orthostatics this AM with lying /58 and sitting 111/64. No standing due to profound weakness yesterday. PT consulted and will plan on full orthostatics later today. Creatinine .8. BP stable. HR 110s. Will plan to transition to oral Eliquis from Heparin         ROS  Objective:     Vital Signs (Most Recent):  Temp: 98 °F (36.7 °C) (05/10/18 0704)  Pulse: (!) 113 (05/10/18 1025)  Resp: (!) 32 (05/10/18 1025)  BP: 116/63 (05/10/18 1025)  SpO2: (!) 88 % (05/10/18 1025) Vital Signs (24h Range):  Temp:  [97.5 °F (36.4 °C)-98.2 °F (36.8 °C)] 98 °F (36.7 °C)  Pulse:  [] 113  Resp:  [16-45] 32  SpO2:  [88 %-97 %] 88 %  BP: (106-222)/() 116/63     Weight: 52.1 kg (114 lb 13.8 oz)  Body mass index is 20.35 kg/m².     SpO2: (!) 88 %  O2 Device (Oxygen Therapy): room  air      Intake/Output Summary (Last 24 hours) at 05/10/18 1052  Last data filed at 05/10/18 0800   Gross per 24 hour   Intake          1224.48 ml   Output              315 ml   Net           909.48 ml       Lines/Drains/Airways     Drain                 Urethral Catheter 05/04/18 0403 14 Fr. 6 days          Peripheral Intravenous Line                 Peripheral IV - Single Lumen 05/03/18 2052 Right Wrist 6 days         Peripheral IV - Single Lumen 05/07/18 1700 Right Forearm 2 days                Physical Exam   Constitutional: She is oriented to person, place, and time. She appears well-developed and well-nourished. No distress.   Cardiovascular: Normal rate and regular rhythm.  Exam reveals no gallop.    No murmur heard.  Pulmonary/Chest: Effort normal and breath sounds normal. No respiratory distress. She has no wheezes.   Abdominal: Soft. Bowel sounds are normal. She exhibits no distension. There is no tenderness.   Neurological: She is alert and oriented to person, place, and time.   Skin: Skin is warm and dry.       Significant Labs:       Recent Labs  Lab 05/10/18  0341   *   K 4.8      CO2 25   BUN 12   CREATININE 0.8       Recent Labs  Lab 05/10/18  0341   WBC 7.09   RBC 3.71*   HGB 11.0*   HCT 34.8*      MCV 94   MCH 29.6   MCHC 31.6*       Significant Imaging: Echocardiogram:   2D echo with color flow doppler:   Results for orders placed or performed during the hospital encounter of 05/03/18   2D echo with color flow doppler   Result Value Ref Range    EF 10 (A) 55 - 65    Mitral Valve Regurgitation SEVERE (A)     Aortic Valve Stenosis MILD (A)     Est. PA Systolic Pressure 29.42     Pericardial Effusion NONE     Tricuspid Valve Regurgitation MODERATE (A)      Assessment and Plan:         * Bilateral pulmonary embolism    -CTA chest with extensive filling defects throughout the pulmonary tree involving the segmental vessels to the right upper, right lower, left upper and left lower  lobes.  Bowing of the interventricular septum to the left with dilatation of the right-sided chambers noted. Evidence of LLL infarction noted.  -treated with EKOS catheter directed TPA with improvement on PA pressures  -on IV Heparin due to need for thoracentesis post procedure  -recommend initiation of Eliquis if no further procedures needed; Eliquis 10mg po BID x 7 days then decrease to 5mg po BID            Acute deep vein thrombosis (DVT) of both lower extremities    -DVT upon admission with nonocclusive thrombus seen within the right common femoral vein, right proximal superficial femoral vein and occlusive thrombus left peroneal vein.  -remains on IV Heparin; will transition to oral Eliquis today   -repeat BLE venous ultrasound per primary team yesterday with resolution of left peroneal DVT; occlusive thrombus in the right popliteal vein extending into the peroneal vein on the right with clearing of RCFV and superficial femoral vein DVT          Chronic combined systolic and diastolic heart failure    -echo with severely depressed LV function with EF 10% with moderate MR and TR  -no volume overload on exam currently  -IVF for now   -monitor fluid volume status closely with strict I&Os and daily weights           Essential hypertension    -SBP 120s-150s overnight  -repeat orthostatics this AM positive lying /58 sitting 111/64  -IVF remain infusing; +903 overnight and positive 2.5 since admission   -recommend repeat orthostatics today with PT consult; if BP drops without symptoms could be related to debility and prolonged bedrest; if recurrent dizziness or weakness with +orthostatics recommend continuation of IVF   -will continue to hold antihypertensives         Atrial fibrillation with RVR    -CCB remains on hold due to marginal BP upon presentation and orthostatic hypotension   -HR up to 110s this AM; IVF remain in process   -remains on IV Heparin; will transition to oral Eliquis today             VTE  Risk Mitigation         Ordered     heparin 25,000 units in dextrose 5% 250 mL (100 units/mL) infusion; FEMALE  Continuous      05/04/18 0334     heparin 25,000 units in dextrose 5% 250 mL (100 units/mL) bolus from bag; PRN BOLUS  As needed (PRN)      05/04/18 0334     heparin 25,000 units in dextrose 5% 250 mL (100 units/mL) bolus from bag; PRN BOLUS  As needed (PRN)      05/04/18 0334     IP VTE HIGH RISK PATIENT  Once      05/04/18 0322          Jaymie Vyas, APRN, ANP  Cardiology  [facility]

## 2018-05-10 NOTE — PLAN OF CARE
Problem: Occupational Therapy Goal  Goal: Occupational Therapy Goal  Goals to be met by: 6/5/18     Patient will increase functional independence with ADLs by performing:    LE Dressing with Minimal Assistance.  Grooming while standing with Contact Guard Assistance.  Toileting from bedside commode with Contact Guard Assistance for hygiene and clothing management.   Supine to sit with Contact Guard Assistance.  Stand pivot transfers with Contact Guard Assistance.  Toilet transfer to bedside commode with Contact Guard Assistance.  Increased functional strength to WFL for self care skills and functional mobility.  Upper extremity exercise program x10 reps per handout, with independence.     Outcome: Ongoing (interventions implemented as appropriate)  Elena Patton is a 77 y.o. female with a medical diagnosis of Bilateral pulmonary embolism.  She presents with decreased overall strength, endurance, balance and [place] and safety with ADL's and fx mobility.    Performance deficits affecting function are weakness, impaired endurance, impaired self care skills, impaired functional mobilty, gait instability, impaired balance, decreased upper extremity function, decreased lower extremity function, decreased safety awareness, impaired cardiopulmonary response to activity.  Pt with fair tolerance of therapy secondary to drowsy from pain medicine.  Pt leaning to R side and posteriorly while sitting EOB and in standing. Continue OT services to address functional goals, progressing as able.      SANCHEZ Christianson

## 2018-05-10 NOTE — ASSESSMENT & PLAN NOTE
-echo with severely depressed LV function with EF 10% with moderate MR and TR  -no volume overload on exam currently  -IVF for now   -monitor fluid volume status closely with strict I&Os and daily weights

## 2018-05-10 NOTE — PROGRESS NOTES
Received report from off going nurse Ilda. Patient is resting in bed with eyes close, yet she arouse easily. POC discussed with patient, she seem very concerned about her pain medication. She was just given pain medication at 1803 per order. All assessment per flow record. Call bell within reach along with liquids for her too drink and snack.

## 2018-05-10 NOTE — PLAN OF CARE
TN faxed updated clinicals via [facility] to [facility].    Fabiola Dowling RN  Transition Navigator  [phone]

## 2018-05-10 NOTE — PROGRESS NOTES
Progress Note  [facility]    Admit Date: 5/3/2018   LOS: 6 days     SUBJECTIVE:     Follow-up For:  Extensive bilateral PE and R-sided pleural effusion     Patient interviewed and examined this morning. Much more alert today, up eating breakfast and talking with respiratory therapist. Still having right calf pain, but improves with percocet. Denies SOB or chest pain. UOP 425cc in last 24 hrs, net + 2.5 L since admit. .    Scheduled Meds:   albuterol-ipratropium 2.5mg-0.5mg/3mL  3 mL Nebulization Q6H    amiodarone  200 mg Oral BID    apixaban  10 mg Oral BID    [START ON 5/17/2018] apixaban  5 mg Oral BID    aspirin  325 mg Oral Daily    azithromycin  500 mg Oral Daily    cetirizine  10 mg Oral Daily    DULoxetine  30 mg Oral Daily    gabapentin  600 mg Oral TID    levothyroxine  25 mcg Oral Before breakfast    lidocaine  1 patch Transdermal Q24H    lubiprostone  24 mcg Oral BID WM    pantoprazole 40 mg in dextrose 5 % 100 mL infusion (ready to mix system)  40 mg Intravenous Daily     Continuous Infusions:    PRN Meds:acetaminophen, benzonatate, bisacodyl, hydrOXYzine HCl, ibuprofen, oxyCODONE-acetaminophen, sodium chloride 0.9%    Review of patient's allergies indicates:  No Known Allergies    Review of Systems  Pos: right calf pain  Neg: CP, SOB    OBJECTIVE:     Vital Signs (Most Recent)  Temp: 97.9 °F (36.6 °C) (05/10/18 1104)  Pulse: (!) 114 (05/10/18 1515)  Resp: (!) 25 (05/10/18 1515)  BP: 104/74 (05/10/18 1500)  SpO2: (!) 93 % (05/10/18 1515)    Vital Signs Range (Last 24H):  Temp:  [97.8 °F (36.6 °C)-98.2 °F (36.8 °C)]   Pulse:  []   Resp:  [15-45]   BP: (102-222)/()   SpO2:  [88 %-97 %]     I & O (Last 24H):    Intake/Output Summary (Last 24 hours) at 05/10/18 1556  Last data filed at 05/10/18 1400   Gross per 24 hour   Intake          1341.79 ml   Output              340 ml   Net          1001.79 ml     Wt Readings from Last 3 Encounters:   05/08/18 52.1 kg (114 lb 13.8  oz)   04/26/18 48.8 kg (107 lb 9.4 oz)   12/11/17 49 kg (108 lb)     Physical Exam:  Gen: NAD  HEENT: NC, AT  Chest: decreased breath sounds b/l;  mild bibasilar rales b/l   CVS: irregular irregular rhythm, no m/r/g  Abdomen: soft, NT, ND, no masses, +BS  Ext: no edema, pulses 2+   Skin: ro rashes  Neuro: A&O x 3, CN's grossly intact, sensation intact to light touch  Psych: Normal mood, affect, thought content    Laboratory:  LABS  CBC    Recent Labs  Lab 05/08/18  0405 05/09/18  0324 05/10/18  0341   WBC 6.38 6.63 7.09   RBC 3.64* 3.54* 3.71*   HGB 10.7* 10.4* 11.0*   HCT 33.6* 32.9* 34.8*    295 332   MCV 92 93 94   MCH 29.4 29.4 29.6   MCHC 31.8* 31.6* 31.6*     BMP    Recent Labs  Lab 05/08/18  0405 05/09/18  0324 05/10/18  0341    137 133*   K 3.1* 3.4* 4.8   CO2 26 26 25    105 102   BUN 10 10 12   CREATININE 0.7 0.7 0.8   GLU 98 96 98         Recent Labs  Lab 05/04/18  0401  05/08/18  0405 05/09/18  0324 05/10/18  0341   CALCIUM 8.0*  < > 8.1* 8.0* 8.6*   MG 1.8  --  1.6  --   --    PHOS 3.4  --  3.1  --   --    < > = values in this interval not displayed.  LFT    Recent Labs  Lab 05/08/18  0405 05/09/18  0324 05/10/18  0341   PROT 4.7* 4.6* 5.2*   ALBUMIN 1.5* 1.5* 1.7*   BILITOT 0.4 0.3 0.3   AST 13 12 12   ALKPHOS 81 81 89   ALT 9* 7* 8*       COAGS    Recent Labs  Lab 05/04/18  0058  05/09/18 0324 05/09/18  1149 05/10/18  0341   INR 1.1  --   --   --   --    APTT 28.1  < > 69.6* 51.2* 57.4*   < > = values in this interval not displayed.  CE    Recent Labs  Lab 05/03/18 2114   TROPONINI 0.010     BNP    Recent Labs  Lab 05/03/18 2114   *       LAST HbA1c  Lab Results   Component Value Date    HGBA1C 4.8 04/19/2018         Diagnostic Results:    IP CONSULT TO CARDIOLOGY  IP CONSULT TO PULMONOLOGY  IP CONSULT TO CARDIOLOGY-[facility]  IP CONSULT TO NEPHROLOGY-[facility]  IP CONSULT TO REGISTERED DIETITIAN/NUTRITIONIST    ASSESSMENT/PLAN:     Neuro/Psych:  · A&O x 3  · Avoid sedating pain  meds if possible- percocet dose yesterday appeared to cause worsening of condition  · Pt is on cymbalta and amytriptiline. Stopped amytriptiline as this can lead to orthostatic hypotension.     Cardiovascular: (Hypertension, Chronic atrial fibrillation w/ hx of RVR)   · Home meds include amiodarone and diltiazem.     Holding diltiazem and continue Amio  · ECHO 4/2018 w/ EF 45%, grade 3 diastolic dysfunction    ECHO 5/2018: EF 10%, severe MR, mod TR  · Right heart strain evident on CTA  · Pt with pacemaker   Cardiology was re-consulted and feels the orthostatic hypotension is 2/2 needing fluids   Given MIVF's per Nephro and Cards recs with improvement of BP's however symptomatic orthostatic hypotension persists and pt becomes very dizzy when standing up to work with PT.  She continues to be a significant fall risk     Pulmonary: (Bilat Pulm Embolisms, ?Pneumonia)   · DuoNeb q 6 hours scheduled  · ABx: deescalated to Azithromycin IV   Moderate right-sided pleural effusion drained by Pulm, removed 2L   Maintaining O2 saturations on 2L NC    GI/NUT:   · Cardiac diet with thin liquids per nutritionist   Added Boost Plus with all meals   Albumin 1.7  · GI Proph -- Protonix 40 mg IV daily while NPO     Renal/Electrolytes:   · UOP 425cc in last 24 hrs, net + 2.5 L since admit   · marley in place; would like to remove     Nephrology recs for IVF's; may also try midodrine    Infectious Disease: (pneumonia)   · Concern for recurrent RLL PNA, currently treating with Azithromycin (deescalated)  · Leukocytosis improving: WBC now normal at 7  · Blood cultures NGTD, urine cultures growing candida, asymptomatic   will hold of on diflucan for now as can cause QT prolongation and patient is already on amiodarone    Pt with hx of ESBL UTI    Hem/Coag: (Anemia, DVT Proph)   · Current Hgb =       · 11  ·  H&H has been stable  ·  On IV Heparin GGT for bilat pulm embolism with protocol for checking  PTT  ·  Cards on board; s/p cath lab  thrombolysis     Cards recommending long-term AC with eliquis when no other procedures planned  ·  DVT Proph - Heparin for now     Endocrine:   · Conservative glycemic control, goal < 180 mg/dl     Musculoskeletal/Skin:   · Sacral erythema  · Repositioning per ICU protocol    Chronic back and b/l foot pain with Hx of neuropathy on percocet 5 mg and gabapentin at home   Restarted gabapentin but giving percocet sparingly   F/u US of RLE to look for possible DVT in setting of recent PE     Dispo: F/u Orthostatic BP and possibly step down today, switch AC to NOAC    5/10/2018 Tushar Cho MD  7:43 AM

## 2018-05-10 NOTE — PT/OT/SLP PROGRESS
Occupational Therapy   Treatment    Name: Elena Patton  MRN: [id number]  Admitting Diagnosis:  Bilateral pulmonary embolism       Recommendations:     Discharge Recommendations: nursing facility, skilled  Discharge Equipment Recommendations:  bedside commode  Barriers to discharge:  Decreased caregiver support    Subjective     Communicated with: RN prior to session.  Pain/Comfort:  · Pain Rating 1: 0/10  · Pain Rating Post-Intervention 1: 0/10    Patients cultural, spiritual, Jewish conflicts given the current situation:      Objective:     Patient found with: peripheral IV, oxygen, marley catheter    General Precautions: Standard, fall   Orthopedic Precautions:N/A   Braces: N/A     Occupational Performance:    Bed Mobility:    · Patient completed Rolling/Turning to Right with moderate assistance and with side rail  · Patient completed Scooting/Bridging with moderate assistance and scoot seated to EOB  · Patient completed Supine to Sit with moderate assistance, with side rail and increased time and effort, vc's for effective technique     Functional Mobility/Transfers:  · Patient completed Sit <> Stand Transfer with moderate assistance  with  rolling walker   · Patient completed Bed <> Chair Transfer using Stand Pivot technique with moderate assistance with no assistive device  · Patient completed Toilet Transfer Stand Pivot technique with moderate assistance with  bedside commode    Activities of Daily Living:  · Grooming: minimum assistance to thoroughly comb hair with RUE  · LB Dressing: total assistance radha socks   · Toileting: total assistance for perianal care    Patient left up in chair with all lines intact, call button in reach and RN notified    AMPA 6 Click:  AMPAC Total Score: 14    Treatment & Education:  RN present to assess orthostatics. See RN notes for details. Pt cleared after assessment by RN for chair transfer.  Pt sat EOB with SBA-Max A 2/2 initially leaning to R side.  Sit<>stand with  Mod A  using RW.  Pt stood x 2 min for BP assessment with Mod-Max A to maintain 2/2 leaning posteriorly and to R side.   Education:    Assessment:     Elena Patton is a 77 y.o. female with a medical diagnosis of Bilateral pulmonary embolism.  She presents with decreased overall strength, endurance, balance and [place] and safety with ADL's and fx mobility.    Performance deficits affecting function are weakness, impaired endurance, impaired self care skills, impaired functional mobilty, gait instability, impaired balance, decreased upper extremity function, decreased lower extremity function, decreased safety awareness, impaired cardiopulmonary response to activity.  Pt with fair tolerance of therapy secondary to drowsy from pain medicine.  Pt leaning to R side and posteriorly while sitting EOB and in standing. Continue OT services to address functional goals, progressing as able.      Rehab Prognosis:  good; patient would benefit from acute skilled OT services to address these deficits and reach maximum level of function.       Plan:     Patient to be seen 5 x/week to address the above listed problems via self-care/home management, therapeutic exercises, therapeutic activities  · Plan of Care Expires: 06/05/18  · Plan of Care Reviewed with: patient    This Plan of care has been discussed with the patient who was involved in its development and understands and is in agreement with the identified goals and treatment plan    GOALS:    Occupational Therapy Goals        Problem: Occupational Therapy Goal    Goal Priority Disciplines Outcome Interventions   Occupational Therapy Goal     OT, PT/OT Ongoing (interventions implemented as appropriate)    Description:  Goals to be met by: 6/5/18     Patient will increase functional independence with ADLs by performing:    LE Dressing with Minimal Assistance.  Grooming while standing with Contact Guard Assistance.  Toileting from bedside commode with Contact Guard Assistance for  hygiene and clothing management.   Supine to sit with Contact Guard Assistance.  Stand pivot transfers with Contact Guard Assistance.  Toilet transfer to bedside commode with Contact Guard Assistance.  Increased functional strength to WFL for self care skills and functional mobility.  Upper extremity exercise program x10 reps per handout, with independence.                      Time Tracking:     OT Date of Treatment: 05/10/18  OT Start Time: 1010  OT Stop Time: 1048  OT Total Time (min): 38 min    Billable Minutes:Self Care/Home Management 38    SANCHEZ Christianson  5/10/2018

## 2018-05-10 NOTE — SUBJECTIVE & OBJECTIVE
ROS  Objective:     Vital Signs (Most Recent):  Temp: 98 °F (36.7 °C) (05/10/18 0704)  Pulse: (!) 113 (05/10/18 1025)  Resp: (!) 32 (05/10/18 1025)  BP: 116/63 (05/10/18 1025)  SpO2: (!) 88 % (05/10/18 1025) Vital Signs (24h Range):  Temp:  [97.5 °F (36.4 °C)-98.2 °F (36.8 °C)] 98 °F (36.7 °C)  Pulse:  [] 113  Resp:  [16-45] 32  SpO2:  [88 %-97 %] 88 %  BP: (106-222)/() 116/63     Weight: 52.1 kg (114 lb 13.8 oz)  Body mass index is 20.35 kg/m².     SpO2: (!) 88 %  O2 Device (Oxygen Therapy): room air      Intake/Output Summary (Last 24 hours) at 05/10/18 1052  Last data filed at 05/10/18 0800   Gross per 24 hour   Intake          1224.48 ml   Output              315 ml   Net           909.48 ml       Lines/Drains/Airways     Drain                 Urethral Catheter 05/04/18 0403 14 Fr. 6 days          Peripheral Intravenous Line                 Peripheral IV - Single Lumen 05/03/18 2052 Right Wrist 6 days         Peripheral IV - Single Lumen 05/07/18 1700 Right Forearm 2 days                Physical Exam   Constitutional: She is oriented to person, place, and time. She appears well-developed and well-nourished. No distress.   Cardiovascular: Normal rate and regular rhythm.  Exam reveals no gallop.    No murmur heard.  Pulmonary/Chest: Effort normal and breath sounds normal. No respiratory distress. She has no wheezes.   Abdominal: Soft. Bowel sounds are normal. She exhibits no distension. There is no tenderness.   Neurological: She is alert and oriented to person, place, and time.   Skin: Skin is warm and dry.       Significant Labs:       Recent Labs  Lab 05/10/18  0341   *   K 4.8      CO2 25   BUN 12   CREATININE 0.8       Recent Labs  Lab 05/10/18  0341   WBC 7.09   RBC 3.71*   HGB 11.0*   HCT 34.8*      MCV 94   MCH 29.6   MCHC 31.6*       Significant Imaging: Echocardiogram:   2D echo with color flow doppler:   Results for orders placed or performed during the hospital  encounter of 05/03/18   2D echo with color flow doppler   Result Value Ref Range    EF 10 (A) 55 - 65    Mitral Valve Regurgitation SEVERE (A)     Aortic Valve Stenosis MILD (A)     Est. PA Systolic Pressure 29.42     Pericardial Effusion NONE     Tricuspid Valve Regurgitation MODERATE (A)

## 2018-05-10 NOTE — PLAN OF CARE
Problem: SLP Goal  Goal: SLP Goal  SLP Short Term Goals:  1. Patient will consume >50% dental soft diet with thin liquids with no overt s/s of aspiration given mod-max assist.-ongoing  2. Patient will implement safe swallowing strategies 100% of the time during meals given mod-max assist.     Outcome: Ongoing (interventions implemented as appropriate)  Pt seen this AM for dysphagia tx.  Per RN, pt continues with dcr'd participation in PO intake. SLP spoke with Dietician, Amanda Candelaria, about ordering calorie count. Dietician stated she will post calorie count in pt's room to ensure pt is meeting nutritional needs. SLP recs: con't dental soft/thin liquids PO diet with cuing/encouragement to participate in PO intake, and all other universal swallow precautions.  KAMAR Salcedo., CF-SLP  Speech-Language Pathologist

## 2018-05-10 NOTE — ASSESSMENT & PLAN NOTE
-DVT upon admission with nonocclusive thrombus seen within the right common femoral vein, right proximal superficial femoral vein and occlusive thrombus left peroneal vein.  -remains on IV Heparin; will transition to oral Eliquis today   -repeat BLE venous ultrasound per primary team yesterday with resolution of left peroneal DVT; occlusive thrombus in the right popliteal vein extending into the peroneal vein on the right with clearing of RCFV and superficial femoral vein DVT

## 2018-05-10 NOTE — ASSESSMENT & PLAN NOTE
-CCB remains on hold due to marginal BP upon presentation and orthostatic hypotension   -HR up to 110s this AM; IVF remain in process   -remains on IV Heparin; will transition to oral Eliquis today

## 2018-05-10 NOTE — PT/OT/SLP PROGRESS
"Speech Language Pathology Treatment    Patient Name:  Elena Patton   MRN:  [id number]  Admitting Diagnosis: Bilateral pulmonary embolism    Recommendations:                 General Recommendations:  Dysphagia therapy  Diet recommendations:  Dental Soft, Chopped meat, Liquid Diet Level: Thin   Aspiration Precautions: Check Calorie Count and encourage participation in PO intake and Standard aspiration precautions   General Precautions: Standard, fall  Communication strategies:  none    Subjective     Pt found in bed upon SLP entry. Pt awake, alert and oriented to name,  and year. CASSIDY Ramirez stated pt appears confused at times, especially about time during the day (ex: confusing night and day).     Patient goals: "I want chicken noodle soup for supper" per pt     Pain/Comfort:  · Pain Rating 1: 0/10    Objective:     Has the patient been evaluated by SLP for swallowing?   Yes  Keep patient NPO? No   Current Respiratory Status: room air      Pt seen this AM for dysphagia tx. Pt continues to tolerate thin liquids via cup x6, puree x5, and dental soft textures x4 with no overt s/s of aspiration.  Per RN, pt continues with dcr'd participation in PO intake. SLP spoke with Dietician, Amanda Candelaria, about ordering calorie count. Dietician stated she will post calorie count in pt's room to ensure pt is meeting nutritional needs.       Assessment:     Elena Patton is a 77 y.o. female with an SLP diagnosis of Dysphagia.  She continues to present with dcr'd PO intake and requires max encouragement to participate in limited PO trials. SLP contacted dietician in regards to ordering a calorie count to ensure pt is meeting nutritional needs.   SLP recs: con't dental soft/thin liquids PO diet with cuing/encouragement to participate in PO intake, and all other universal swallow precautions.    Goals:    SLP Goals        Problem: SLP Goal    Goal Priority Disciplines Outcome   SLP Goal     SLP Ongoing (interventions implemented as " appropriate)   Description:  SLP Short Term Goals:  1. Patient will consume >50% dental soft diet with thin liquids with no overt s/s of aspiration given mod-max assist.-ongoing  2. Patient will implement safe swallowing strategies 100% of the time during meals given mod-max assist.                      Plan:     · Patient to be seen:  3 x/week   · Plan of Care expires:  06/03/18  · Plan of Care reviewed with:  patient (CASSIDY Gonsales)   · SLP Follow-Up:  Yes       Discharge recommendations:  nursing facility, skilled     Time Tracking:     SLP Treatment Date:   05/10/18  Speech Start Time:  0914  Speech Stop Time:  0924     Speech Total Time (min):  10 min    Billable Minutes: Treatment Swallowing Dysfunction 10    LIZETTE Salcedo, CF-SLP  Speech-Language Pathologist   5/10/2018

## 2018-05-10 NOTE — CONSULTS
[employer] renal fellow LINDEN        Reason for Consult:     Ortho static hypotension    Subjective:      History of Present Illness:  Elena Patton is a 77 y.o. C female who  has a past medical history of Anticoagulant long-term use; Atrial fibrillation; Coronary artery disease; Hypertension; Renal disorder; and Thyroid disease.. The patient presented to the [facility] on 5/3/2018 with a primary complaint of Shortness of Breath (To ER per [facility] with c/o SOB and chest, abd pain from [facility] the [facility].)      Pt admitted after Dx'd with B PE s/p tpa; over the last couple of days, the pt has had orthostatic hypotension.    Pt is a poor historian    On my eval, the pt was getting orthostatic VS and denies dizziness    Past Medical History:  Past Medical History:   Diagnosis Date    Anticoagulant long-term use     Atrial fibrillation     Coronary artery disease     Hypertension     Renal disorder     Thyroid disease        Past Surgical History:  Past Surgical History:   Procedure Laterality Date    APPENDECTOMY      CARDIAC SURGERY      HYSTERECTOMY      JOINT REPLACEMENT         Allergies:  Review of patient's allergies indicates:  No Known Allergies    Medications:   In-Hospital Scheduled Medications:   albuterol-ipratropium 2.5mg-0.5mg/3mL  3 mL Nebulization Q6H    amiodarone  200 mg Oral BID    aspirin  325 mg Oral Daily    azithromycin  500 mg Oral Daily    cetirizine  10 mg Oral Daily    DULoxetine  30 mg Oral Daily    gabapentin  600 mg Oral TID    levothyroxine  25 mcg Oral Before breakfast    lidocaine  1 patch Transdermal Q24H    lubiprostone  24 mcg Oral BID WM    pantoprazole 40 mg in dextrose 5 % 100 mL infusion (ready to mix system)  40 mg Intravenous Daily    tuberculin  5 Units Intradermal Once      In-Hospital PRN Medications:  acetaminophen, benzonatate, bisacodyl, heparin (PORCINE), heparin (PORCINE), hydrOXYzine HCl, ibuprofen, oxyCODONE-acetaminophen, sodium chloride 0.9%    In-Hospital IV Infusion Medications:   dextrose 5 % and 0.45 % NaCl with KCl 20 mEq 75 mL/hr at 05/10/18 0606    heparin (porcine) in D5W 20 Units/kg/hr (05/10/18 0005)      Home Medications:  Prior to Admission medications    Medication Sig Start Date End Date Taking? Authorizing Provider   amiodarone (PACERONE) 200 MG Tab Take by mouth once daily.    Historical Provider, MD   amitriptyline (ELAVIL) 10 MG tablet Take 10 mg by mouth 2 (two) times daily.    Historical Provider, MD   aspirin 325 MG tablet Take 1 tablet (325 mg total) by mouth once daily. 4/26/18 4/26/19  Jackie Kline MD   benzonatate (TESSALON) 100 MG capsule Take 100 mg by mouth 3 (three) times daily as needed for Cough.    Historical Provider, MD   cetirizine (ZYRTEC) 10 MG tablet Take 10 mg by mouth once daily.    Historical Provider, MD   diltiaZEM (CARDIZEM) 30 MG tablet Take 1 tablet (30 mg total) by mouth every 6 (six) hours. 4/26/18 4/26/19  Jackie Kline MD   DULoxetine (CYMBALTA) 30 MG capsule Take 30 mg by mouth once daily.    Historical Provider, MD   FERROUS SULFATE, BULK, MISC by Misc.(Non-Drug; Combo Route) route.    Historical Provider, MD   furosemide (LASIX) 40 MG tablet Take 1 tablet (40 mg total) by mouth once daily. 4/27/18 4/27/19  Jackie Kline MD   gabapentin (NEURONTIN) 600 MG tablet Take 600 mg by mouth 3 (three) times daily.    Historical Provider, MD   ipratropium (ATROVENT) 42 mcg (0.06 %) nasal spray 2 sprays by Nasal route 3 (three) times daily as needed for Rhinitis. 4/26/18   Jackie Kline MD   Lactobacillus acidoph-L.bulgar 1 million cell Chew Take 4 tablets by mouth 3 (three) times daily with meals. 4/26/18   Jackie Kline MD   levothyroxine (SYNTHROID) 25 MCG tablet Take 25 mcg by mouth once daily.    Historical Provider, MD   lubiprostone (AMITIZA) 24 MCG Cap Take 24 mcg by mouth 2 (two) times daily with meals.    Historical Provider, MD   multivitamin (THERAGRAN) tablet Take 1 tablet by  mouth once daily. 18   Jackie Kline MD   oxyCODONE-acetaminophen (PERCOCET) 5-325 mg per tablet Take 1 tablet by mouth every 6 (six) hours as needed for Pain. 18   Jackie Kline MD   promethazine (PHENERGAN) 25 MG tablet Take 25 mg by mouth every 6 (six) hours as needed for Nausea.    Historical Provider, MD       Family History:  History reviewed. No pertinent family history.    Social History:  Social History   Substance Use Topics    Smoking status: Never Smoker    Smokeless tobacco: Never Used    Alcohol use No       Review of Systems:  All other systems are reviewed and are negative.    Health Maintenance:     Immunizations:   Currently on File:   Most Recent Immunizations   Administered Date(s) Administered    PPD Test 2018          Objective:   Last 24 Hour Vital Signs:  BP  Min: 101/67  Max: 222/146  Temp  Av.9 °F (36.6 °C)  Min: 97.5 °F (36.4 °C)  Max: 98.2 °F (36.8 °C)  Pulse  Av.5  Min: 99  Max: 128  Resp  Av.7  Min: 16  Max: 45  SpO2  Av.5 %  Min: 77 %  Max: 97 %  I/O last 3 completed shifts:  In: 2766.3 [P.O.:300; I.V.:2466.3]  Out: 1350 [Urine:1350]    Physical Examination:  GEN - NAD, alert and awake  HEAD - NCAT   EYES - PERRLA, non icteric   NECK - no thyromegaly/LAD   CHEST - lungs CTA bilaterally; equal expansion   HEART - tachy, no m/r/s3/s4   ABD - non distended, non ttp; no guarding or rigidity   EXTR  -warm; no edema  Neuro - no asterixis or localizing deficits      Laboratory Results:    Trended Lab Data:    Recent Labs  Lab 18  0405 18  0324 05/10/18  0341   WBC 6.38 6.63 7.09   HGB 10.7* 10.4* 11.0*   HCT 33.6* 32.9* 34.8*    295 332   MCV 92 93 94   RDW 18.1* 17.9* 18.1*    137 133*   K 3.1* 3.4* 4.8    105 102   CO2 26 26 25   BUN 10 10 12   GLU 98 96 98   PROT 4.7* 4.6* 5.2*   ALBUMIN 1.5* 1.5* 1.7*   BILITOT 0.4 0.3 0.3   AST 13 12 12   ALKPHOS 81 81 89   ALT 9* 7* 8*       Trended Cardiac Data:    Recent  Labs  Lab 05/03/18 2114   TROPONINI 0.010   *           Other Results:      Radiology:  Ct Head Without Contrast    Result Date: 4/22/2018  EXAMINATION: CT HEAD WITHOUT CONTRAST CLINICAL HISTORY: left upper extremity weakness; TECHNIQUE: Low dose axial images were obtained through the head.  Coronal and sagittal reformations were also performed. Contrast was not administered. FINDINGS: The brain is significant for moderate periventricular and subcortical hypoattenuation consistent with microvascular ischemic change.  The ventricular system is prominent consistent with involutional change.  There is no intra or extra-axial mass or hemorrhage identified.  The visualized extracranial structures are unremarkable.     No acute intracranial abnormality. Electronically signed by: Charlie Marlow MD Date:    04/22/2018 Time:    10:06    Ct Head Without Contrast    Result Date: 4/19/2018  EXAMINATION: CT HEAD WITHOUT CONTRAST CLINICAL HISTORY: fall, head injury; TECHNIQUE: Low dose axial CT images obtained throughout the head without intravenous contrast. Sagittal and coronal reconstructions were performed. COMPARISON: None. FINDINGS: Intracranial compartment: Ventricles and sulci are normal in size for age without evidence of hydrocephalus. No extra-axial blood or fluid collections. Diffuse hypoattenuation of the supratentorial white matter which is nonspecific but likely represents moderate chronic microvascular ischemic changes.  No parenchymal mass, hemorrhage, edema or major vascular distribution infarct. Skull/extracranial contents (limited evaluation): No fracture. Mastoid air cells and paranasal sinuses are essentially clear.     No evidence of acute infarction, hemorrhage, mass, or hydrocephalus.  Follow-up as clinically indicated. Moderate chronic microvascular ischemic changes. Electronically signed by: Óscar Schneider MD Date:    04/19/2018 Time:    11:58    Cta Chest Non-coronary (pe Study)    Result Date:  5/4/2018  EXAMINATION: CTA CHEST NON CORONARY CLINICAL HISTORY: Bacterial pneumonia, not elsewhere classified; TECHNIQUE: Low dose axial images, sagittal and coronal reformations were obtained from the thoracic inlet to the lung bases following the IV administration of 100 mL of Omnipaque 350.  Contrast timing was optimized to evaluate the pulmonary arteries.  MIP images were performed. COMPARISON: Chest x-ray dated 05/03/2018 factors FINDINGS: CT pulmonary embolism examination: There are extensive filling defects throughout the pulmonary tree involving the segmental vessels to the right upper, right lower, left upper and left lower lobes.  No saddle embolism component is present.  There is bowing of the interventricular septum to the left with dilatation of the right-sided chambers.  There is also reflux of contrast into the IVC and the azygos system. CT chest examination: The thyroid gland is unremarkable.  The base of the neck is within normal limits.  The supraclavicular regions are unremarkable. The trachea is unremarkable.  No definitive endobronchial lesion is identified on the left.  Evaluation for endobronchial lesion involving the right middle and lower lobes is significantly difficult given the atelectatic changes present. There is a lower lead pacemaker present with the tips terminating in the right atrium and right ventricle.  No pericardial effusions identified.  There is enlargement of the right-sided chambers as described above. The thoracic aorta is normal in caliber.  There is an aberrant right subclavian artery.  The remainder of the great vessels appear unremarkable. There is no evidence of lymphadenopathy in the chest.  The axillary regions are within normal limits. There is a large right-sided pleural effusion with associated compressive atelectasis.  There is trace left-sided pleural effusion.  There is no evidence of a pneumothorax.  There is no evidence of pneumomediastinum. There is diffuse  emphysema.  There also air bronchograms within the right lower lobe.  There are peripheral airspace opacities in the left lower lobe.  There are scattered ground-glass airspace opacities in the remainder of the lung fields. The esophagus is within normal limits.  There is nodular appearance of the liver.  The remainder of the visualized upper abdominal structures are unremarkable. The chest wall is within normal limits.  The osseous structures are within normal limits.     Extensive bilateral pulmonary embolism with evidence of right heart strain. Large right-sided pleural effusion with associated compressive atelectasis. Air bronchograms within the right lower lobe may represent some component of infectious pneumonia. Peripheral airspace opacity in the left lower lobe may represent areas of pulmonary infarction. Given the degree of atelectasis suggest outpatient PET-CT scan after the patient's symptoms resolve to look for primary pulmonary tumor. Case discussed with Dr. Moseley at 05/04/2018 on 12:05 a.m. Electronically signed by: Trevin Burleson MD Date:    05/04/2018 Time:    00:07    X-ray Chest Ap Portable    Result Date: 5/6/2018  EXAMINATION: XR CHEST AP PORTABLE CLINICAL HISTORY: Pleural effusion; status post thoracentesis; TECHNIQUE: Single frontal view of the chest was performed. COMPARISON: 05/06/2018 FINDINGS: There are a few linear bands projected over the lateral aspect of the right hemothorax, suspected to reflect skin fold rather than pneumothorax however in this patient status post thoracentesis, repeat imaging with differing position of the arms, to alleviate the skin folds, is recommended for definitive exclusion of pneumothorax.  There has been interval decrease of the right pleural fluid, no significant detrimental change otherwise.     As above Electronically signed by: Tino Modoy MD Date:    05/06/2018 Time:    12:14    X-ray Chest Ap Portable    Result Date: 5/6/2018  EXAMINATION: XR CHEST AP  PORTABLE CLINICAL HISTORY: SOB; TECHNIQUE: Single frontal view of the chest was performed. COMPARISON: 05/03/2018 FINDINGS: Cardiac and mediastinal silhouettes are unchanged.  Cardiac pacing device again noted.  There is a large right-sided pleural effusion which has increased in size from prior.  There is probable worsening associated atelectasis throughout the right lung.  Apparent worsening density in the retrocardiac left lung base may represent atelectasis versus pneumonia, correlate clinically.  Small left-sided effusion not excluded.  No pneumothorax visualized.     Interval worsening as above. This report was flagged in Epic as abnormal. Electronically signed by: Tushar Tran MD Date:    05/06/2018 Time:    08:17    X-ray Chest Ap Portable    Result Date: 5/3/2018  EXAMINATION: XR CHEST AP PORTABLE CLINICAL HISTORY: Chest pain, unspecified TECHNIQUE: Single frontal view of the chest was performed. COMPARISON: 04/22/2018 FINDINGS: The cardiomediastinal silhouette is not enlarged noting calcification of the aorta.  Left chest wall pacer noted..  There is a right pleural effusion, noting possible trace left effusion..  The trachea is midline.  The lungs are symmetrically expanded bilaterally with patchy increased interstitial and parenchymal attenuation projected over the right hemothorax.  There is left basilar subsegmental atelectasis or scarring.  Superimposed right lower lung zone consolidation not excluded..  There is no pneumothorax.  The osseous structures are remarkable for degenerative change..     1. Moderate right pleural effusion with associated compressive atelectasis and/or lower lobe consolidation.  Finding is superimposed upon edema bilaterally.  Correlation advised. Electronically signed by: Tino Moody MD Date:    05/03/2018 Time:    21:27    X-ray Chest Ap Portable    Result Date: 4/22/2018  EXAMINATION: XR CHEST AP PORTABLE CLINICAL HISTORY: Cough TECHNIQUE: Single frontal view of  the chest was performed. FINDINGS: The left lung is significant for mild amount of left basilar opacity and blunting of left costophrenic angle.  There is a moderate amount of diffuse right-sided ground-glass density.  These findings are concerning for edema versus infection with bilateral pleural fluid.  These findings are worse on the right compared to previous performed 3 days earlier.  The cardiac silhouette is not enlarged.  There is calcification of the aorta.  The osseous structures are unremarkable.  There is a left-sided cardiac defibrillator.     As above. Electronically signed by: Charlie Marlow MD Date:    04/22/2018 Time:    13:45    X-ray Chest Ap Portable    Result Date: 4/19/2018  EXAMINATION: XR CHEST AP PORTABLE CLINICAL HISTORY: Sepsis; TECHNIQUE: Single frontal view of the chest was performed. COMPARISON: None FINDINGS: No cardiomegaly.  Cardiac pacer with leads in the right atrium and right ventricle.  Atherosclerosis of the thoracic aorta.  Mild prominence of the pulmonary vasculature.  Small to moderate right pleural effusion with atelectasis/consolidation of the right lower lobe.  Trace left pleural effusion.  Remaining portion of the lungs are clear.  Degenerative changes of the thoracic spine.     Small to moderate right pleural effusion with atelectasis of the right lower lung zone. Electronically signed by: Óscar Schneider MD Date:    04/19/2018 Time:    11:19    X-ray Knee 1 Or 2 View Bilateral    Result Date: 5/9/2018  EXAMINATION: XR KNEE 1 OR 2 VIEW BILATERAL CLINICAL HISTORY: right calf pain; TECHNIQUE: Bilateral AP and lateral knees COMPARISON: None FINDINGS: There is no evidence of acute fracture or dislocation.  There are moderate degenerative changes.  There is chondrocalcinosis.  There is tricompartmental degenerative change which is most significant in the medial compartment on the right and in the lateral compartment on the left.  The patellofemoral compartment on the left  demonstrates degenerative change in the patellofemoral compartment on the right is only mildly affected.  There is spurring of the tibial spines bilaterally.  No evidence of large joint effusion.  No abnormal radiopaque foreign body.  There is extensive atherosclerosis.     Moderate degenerative changes of the knees with chondrocalcinosis. Electronically signed by: Martin Henao MD Date:    05/09/2018 Time:    16:30    Us Carotid Bilateral    Result Date: 4/23/2018  EXAMINATION: US CAROTID BILATERAL CLINICAL HISTORY: r/o stenosis; TECHNIQUE: Grayscale and color Doppler ultrasound examination of the carotid and vertebral artery systems bilaterally.  Stenosis estimates are per the NASCET measurement criteria. COMPARISON: None. FINDINGS: Right: Internal Carotid Artery (ICA) peak systolic velocity 60 cm/sec ICA/CCA peak systolic velocity ratio: 1.2 Vertebral artery: Antegrade flow and normal waveform. Left: Internal Carotid Artery (ICA)  peak systolic velocity 39 cm/sec ICA/CCA peak systolic velocity ratio: 1.2 Vertebral artery: Antegrade flow and normal waveform.     No evidence of a hemodynamically significant carotid bifurcation stenosis. Electronically signed by: Charlie Marlow MD Date:    04/23/2018 Time:    15:46    Us Lower Extremity Veins Bilateral    Addendum Date: 5/9/2018    In the impression please note a correction.  The 1st line of the impression should read as follows. An occlusive thrombus in the right popliteal vein extending into the peroneal vein on the right is noted since the previous study. Electronically signed by: Aristeo Charles MD Date:    05/09/2018 Time:    15:47    Result Date: 5/9/2018  EXAMINATION: US LOWER EXTREMITY VEINS BILATERAL CLINICAL HISTORY: right calf pain in setting of recent b/l PE; TECHNIQUE: Duplex and color flow Doppler and dynamic compression was performed of the bilateral lower extremity veins was performed. COMPARISON: Ultrasound 05/04/2018 FINDINGS: Right thigh veins: The right  common femoral vein and the superficial femoral veins are patent.  The greater saphenous vein is also patent and compressible. One of the 2 branches of the popliteal vein demonstrates enlargement with a thrombus.  No significant flow is noted within this vein.  This extends into the peroneal vein.  Findings are suggestive of an acute thrombus in the popliteal vein and extending into the peroneal vein.  The 2nd tributary of the peroneal vein is patent.  The anterior tibial vein and the posterior tibial veins are patent. Left thigh veins: The common femoral, femoral, popliteal, upper greater saphenous, and deep femoral veins are patent and free of thrombus. The veins are normally compressible and have normal phasic flow and augmentation response. Left calf veins: The previously visualized thrombus in the left peroneal vein is not visualized on the current study.     An occlusive thrombus in the left popliteal vein and extending into the peroneal vein on the right since the previous study. The previously noted left peroneal vein thrombus not visualized on the current study. Electronically signed by: Aristeo Charles MD Date:    05/09/2018 Time:    14:46    Us Lower Extremity Veins Bilateral    Result Date: 5/4/2018  EXAMINATION: US LOWER EXTREMITY VEINS BILATERAL CLINICAL HISTORY: r/o DVT; TECHNIQUE: Duplex and color flow Doppler and dynamic compression was performed of the bilateral lower extremity veins was performed. COMPARISON: None FINDINGS: Right thigh veins: Nonocclusive echogenic wall thrombus within the right common femoral vein most suggestive of a chronic thrombus.  Age-indeterminate nonocclusive thrombus at the common femoral/right proximal femoral vein bifurcation.  The remainder of the right lower extremity venous structures are patent including the greater saphenous vein, popliteal vein, posterior tibial vein, anterior tibial vein and peroneal vein. Left thigh veins: The common femoral, femoral, popliteal,  upper greater saphenous, and deep femoral veins are patent and free of thrombus. The veins are normally compressible and have normal phasic flow and augmentation response. Left calf veins: Occlusive thrombus seen within the left peroneal vein Miscellaneous: None     Nonocclusive thrombus seen within the right common femoral vein, right proximal superficial femoral vein. Occlusive thrombus left peroneal vein. This report was flagged in Epic as abnormal. Electronically signed by: Kylah Franco MD Date:    05/04/2018 Time:    15:43    Radiology Report    Result Date: 4/19/2018  Ordered by an unspecified provider.    Radiology Report    Result Date: 4/19/2018  Ordered by an unspecified provider.      Assessment/Plan     Orthostatic hypotension  -resolved; pt undoubtedly will have some orthostatic Sx 2/2 EF 10%  -if pt continues to have decreased po intake, would rec maintenance IVFs; may also consider midodrine  -would cont PT/OT for strength and conditioning     Will sign off    Jm Allen II  [employer] [facility]  [phone]

## 2018-05-10 NOTE — ASSESSMENT & PLAN NOTE
-SBP 120s-150s overnight  -repeat orthostatics this AM positive lying /58 sitting 111/64  -IVF remain infusing; +903 overnight and positive 2.5 since admission   -recommend repeat orthostatics today with PT consult; if BP drops without symptoms could be related to debility and prolonged bedrest; if recurrent dizziness or weakness with +orthostatics recommend continuation of IVF   -will continue to hold antihypertensives

## 2018-05-11 PROBLEM — I50.40 COMBINED SYSTOLIC AND DIASTOLIC CONGESTIVE HEART FAILURE: Status: RESOLVED | Noted: 2018-01-01 | Resolved: 2018-01-01

## 2018-05-11 PROBLEM — J18.9 RIGHT LOWER LOBE PNEUMONIA: Status: RESOLVED | Noted: 2018-01-01 | Resolved: 2018-01-01

## 2018-05-11 PROBLEM — D72.829 LEUKOCYTOSIS: Status: RESOLVED | Noted: 2018-01-01 | Resolved: 2018-01-01

## 2018-05-11 PROBLEM — Z16.12 UTI DUE TO EXTENDED-SPECTRUM BETA LACTAMASE (ESBL) PRODUCING ESCHERICHIA COLI: Status: RESOLVED | Noted: 2018-01-01 | Resolved: 2018-01-01

## 2018-05-11 PROBLEM — R09.02 HYPOXIA: Status: RESOLVED | Noted: 2018-01-01 | Resolved: 2018-01-01

## 2018-05-11 PROBLEM — E87.1 HYPONATREMIA: Status: RESOLVED | Noted: 2018-01-01 | Resolved: 2018-01-01

## 2018-05-11 PROBLEM — J18.9 HCAP (HEALTHCARE-ASSOCIATED PNEUMONIA): Status: RESOLVED | Noted: 2018-01-01 | Resolved: 2018-01-01

## 2018-05-11 PROBLEM — I26.99 PULMONARY EMBOLISM WITH INFARCTION: Status: RESOLVED | Noted: 2018-01-01 | Resolved: 2018-01-01

## 2018-05-11 PROBLEM — J90 PLEURAL EFFUSION, RIGHT: Status: RESOLVED | Noted: 2018-01-01 | Resolved: 2018-01-01

## 2018-05-11 PROBLEM — E03.9 HYPOTHYROIDISM: Status: RESOLVED | Noted: 2018-01-01 | Resolved: 2018-01-01

## 2018-05-11 PROBLEM — R07.1 CHEST PAIN ON BREATHING: Status: ACTIVE | Noted: 2018-01-01

## 2018-05-11 PROBLEM — R33.9 URINE RETENTION: Status: RESOLVED | Noted: 2018-01-01 | Resolved: 2018-01-01

## 2018-05-11 PROBLEM — A41.9 SEPSIS: Status: RESOLVED | Noted: 2018-01-01 | Resolved: 2018-01-01

## 2018-05-11 PROBLEM — E87.6 HYPOKALEMIA: Status: RESOLVED | Noted: 2018-01-01 | Resolved: 2018-01-01

## 2018-05-11 PROBLEM — N39.0 UTI DUE TO EXTENDED-SPECTRUM BETA LACTAMASE (ESBL) PRODUCING ESCHERICHIA COLI: Status: RESOLVED | Noted: 2018-01-01 | Resolved: 2018-01-01

## 2018-05-11 PROBLEM — B96.29 UTI DUE TO EXTENDED-SPECTRUM BETA LACTAMASE (ESBL) PRODUCING ESCHERICHIA COLI: Status: RESOLVED | Noted: 2018-01-01 | Resolved: 2018-01-01

## 2018-05-11 PROBLEM — R07.9 CHEST PAIN: Status: RESOLVED | Noted: 2018-01-01 | Resolved: 2018-01-01

## 2018-05-11 NOTE — PROGRESS NOTES
Progress Note  [facility]    Admit Date: 5/3/2018   LOS: 7 days     SUBJECTIVE:     Follow-up For:  Extensive bilateral PE and R-sided pleural effusion     Patient interviewed and examined this morning. No new complaints today. Still having right calf pain, but improves with percocet. Denies SOB or chest pain. UOP 1400 cc in last 24 hrs, net + 2.2 L since admit.      Scheduled Meds:   albuterol-ipratropium 2.5mg-0.5mg/3mL  3 mL Nebulization Q6H    amiodarone  200 mg Oral BID    apixaban  10 mg Oral BID    [START ON 5/17/2018] apixaban  5 mg Oral BID    aspirin  325 mg Oral Daily    cetirizine  10 mg Oral Daily    DULoxetine  30 mg Oral Daily    gabapentin  600 mg Oral TID    levothyroxine  25 mcg Oral Before breakfast    lidocaine  1 patch Transdermal Q24H    lubiprostone  24 mcg Oral BID WM    pantoprazole 40 mg in dextrose 5 % 100 mL infusion (ready to mix system)  40 mg Intravenous Daily     Continuous Infusions:    PRN Meds:acetaminophen, benzonatate, bisacodyl, hydrOXYzine HCl, ibuprofen, oxyCODONE-acetaminophen, simethicone, sodium chloride 0.9%    Review of patient's allergies indicates:  No Known Allergies    Review of Systems  Pos: right calf pain  Neg: CP, SOB    OBJECTIVE:     Vital Signs (Most Recent)  Temp: 98 °F (36.7 °C) (05/11/18 0733)  Pulse: (!) 119 (05/11/18 0900)  Resp: (!) 25 (05/11/18 0900)  BP: 107/81 (05/11/18 0800)  SpO2: (!) 90 % (05/11/18 0900)    Vital Signs Range (Last 24H):  Temp:  [97.4 °F (36.3 °C)-98 °F (36.7 °C)]   Pulse:  [100-120]   Resp:  [13-45]   BP: ()/(58-98)   SpO2:  [82 %-98 %]     I & O (Last 24H):    Intake/Output Summary (Last 24 hours) at 05/11/18 0929  Last data filed at 05/11/18 0900   Gross per 24 hour   Intake          1298.81 ml   Output             1530 ml   Net          -231.19 ml     Wt Readings from Last 3 Encounters:   05/08/18 52.1 kg (114 lb 13.8 oz)   04/26/18 48.8 kg (107 lb 9.4 oz)   12/11/17 49 kg (108 lb)     Physical  Exam:  Gen: NAD  HEENT: NC, AT  Chest: decreased breath sounds b/l;  mild bibasilar rales b/l   CVS: irregular irregular rhythm, no m/r/g  Abdomen: soft, NT, ND, no masses, +BS  Ext: no edema, pulses 2+   Skin: ro rashes  Neuro: A&O x 3, CN's grossly intact, sensation intact to light touch  Psych: Normal mood, affect, thought content    Laboratory:  LABS  CBC    Recent Labs  Lab 05/09/18  0324 05/10/18  0341 05/11/18  0332   WBC 6.63 7.09 9.03   RBC 3.54* 3.71* 3.71*   HGB 10.4* 11.0* 10.9*   HCT 32.9* 34.8* 34.1*    332 338   MCV 93 94 92   MCH 29.4 29.6 29.4   MCHC 31.6* 31.6* 32.0     BMP    Recent Labs  Lab 05/09/18  0324 05/10/18  0341 05/11/18  0332    133* 130*   K 3.4* 4.8 4.7   CO2 26 25 23    102 101   BUN 10 12 11   CREATININE 0.7 0.8 0.7   GLU 96 98 80         Recent Labs  Lab 05/08/18  0405 05/09/18  0324 05/10/18  0341 05/11/18  0332   CALCIUM 8.1* 8.0* 8.6* 8.6*   MG 1.6  --   --   --    PHOS 3.1  --   --   --      LFT    Recent Labs  Lab 05/09/18  0324 05/10/18  0341 05/11/18  0332   PROT 4.6* 5.2* 4.9*   ALBUMIN 1.5* 1.7* 1.6*   BILITOT 0.3 0.3 0.5   AST 12 12 12   ALKPHOS 81 89 93   ALT 7* 8* 8*       COAGS    Recent Labs  Lab 05/09/18 0324 05/09/18  1149 05/10/18  0341   APTT 69.6* 51.2* 57.4*       LAST HbA1c  Lab Results   Component Value Date    HGBA1C 4.8 04/19/2018         Diagnostic Results:    IP CONSULT TO CARDIOLOGY  IP CONSULT TO PULMONOLOGY  IP CONSULT TO CARDIOLOGY-[facility]  IP CONSULT TO NEPHROLOGY-[facility]  IP CONSULT TO REGISTERED DIETITIAN/NUTRITIONIST    ASSESSMENT/PLAN:     Neuro/Psych:  · A&O x 3  · Avoid sedating pain meds if possible- percocet dose yesterday appeared to cause worsening of condition  · Pt is on cymbalta and amytriptiline. Stopped amytriptiline as this can lead to orthostatic hypotension.     Cardiovascular: (Hypertension, Chronic atrial fibrillation w/ hx of RVR)   · Home meds include amiodarone and diltiazem.     Holding diltiazem and continue  Amio  · ECHO 4/2018 w/ EF 45%, grade 3 diastolic dysfunction    ECHO 5/2018: EF 10%, severe MR, mod TR  · Right heart strain evident on CTA  · Pt with pacemaker    Cardiology was re-consulted and feels the orthostatic hypotension is 2/2 needing fluids    Given MIVF's per Nephro and Cards recs with improvement of BP's however symptomatic orthostatic hypotension persists   and pt becomes very dizzy when standing up to work with PT.    She continues to be a significant fall risk     Pulmonary: (Bilat Pulm Embolisms, ?Pneumonia)   · DuoNeb q 6 hours scheduled  · ABx: completed course for pneumonia   Moderate right-sided pleural effusion drained by Pulm, removed 2L    Maintaining O2 saturations on 2L NC    GI/NUT:   · Cardiac diet with thin liquids per nutritionist    Continue Boost Plus with all meals    Albumin 1.6  · GI Proph -- Protonix 40 mg IV daily while NPO    Patient is not eating much, poor nutrition      Renal/Electrolytes:   · UOP 1400 cc in last 24 hrs, net + 2.2 L since admit   · marley in place; would like to remove today    Nephrology recs for IVF's; may also try midodrine    Infectious Disease: (pneumonia)   · Completed course of Abx for possible pneumonia   · WBC now normal   · Blood cultures NGTD, urine cultures growing candida, asymptomatic   will hold of on diflucan for now as can cause QT prolongation and patient is already on amiodarone    Pt with hx of ESBL UTI    Hem/Coag: (Anemia, DVT Proph)   · Current Hgb =       · 10.9  ·  H&H has been stable  ·  Cards on board; s/p cath lab thrombolysis     Cards recommended eliquis 10 mg PO BID x 7 days, then 5 mg PO BID     Endocrine:   · Conservative glycemic control, goal < 180 mg/dl     Musculoskeletal/Skin:   ·Sacral erythema  ·Repositioning per ICU protocol    Chronic back and b/l foot pain with Hx of neuropathy on percocet 5 mg and gabapentin at home   Continue gabapentin but giving percocet sparingly    Dispo: Transfer to floor, remove  donell    5/11/2018 Tushar Cho MD  7:43 AM

## 2018-05-11 NOTE — PT/OT/SLP PROGRESS
Physical Therapy Treatment    Patient Name:  Elena Patton   MRN:  [id number]    Recommendations:     Discharge Recommendations:  nursing facility, skilled   Discharge Equipment Recommendations: bedside commode   Barriers to discharge: Inaccessible home and Decreased caregiver support    Assessment:     Elena Patton is a 77 y.o. female admitted with a medical diagnosis of Bilateral pulmonary embolism.  She presents with the following impairments/functional limitations:  weakness, impaired endurance, gait instability, impaired functional mobilty, impaired self care skills, impaired balance, decreased lower extremity function, decreased upper extremity function, decreased safety awareness, impaired cardiopulmonary response to activity Patient tolerated sitting in bedside chair most of the day; pt participated with BLE ex and using RW to take steps toward bed; fatigued following session.     Rehab Prognosis:  good; patient would benefit from acute skilled PT services to address these deficits and reach maximum level of function.      Recent Surgery: Procedure(s) (LRB):  Venogram (N/A)      Plan:     During this hospitalization, patient to be seen 6 x/week to address the above listed problems via gait training, therapeutic activities, therapeutic exercises  · Plan of Care Expires:  06/05/18   Plan of Care Reviewed with: patient    Subjective     Communicated with nurse prior to session.  Patient found sitting in chair upon PT entry to room, agreeable to treatment.      Pain/Comfort:  · Pain Rating 1: 0/10  · Location - Side 1: Right  · Location - Orientation 1: upper  · Location 1: thigh  · Pain Addressed 1: Reposition, Nurse notified  · Pain Rating Post-Intervention 1: 0/10    Patients cultural, spiritual, Hinduism conflicts given the current situation: n/a    Objective:     Patient found with: peripheral IV, oxygen, marlye catheter (ICU monitoring)     General Precautions: Standard, fall   Orthopedic Precautions:N/A    Braces: N/A     Functional Mobility:  · Bed Mobility:     · Rolling Left:  minimum assistance  · Scooting: minimum assistance  · Bridging: contact guard assistance and minimum assistance  · Sit to Supine: moderate assistance  · Transfers:     · Sit to Stand: moderate assistance with rolling walker  · Gait: amb 4-5ft with RW with modA 2/2 posterior lean; short step length, assistance with RW management      AM-PAC 6 CLICK MOBILITY  Turning over in bed (including adjusting bedclothes, sheets and blankets)?: 3  Sitting down on and standing up from a chair with arms (e.g., wheelchair, bedside commode, etc.): 2  Moving from lying on back to sitting on the side of the bed?: 2  Moving to and from a bed to a chair (including a wheelchair)?: 2  Need to walk in hospital room?: 1  Climbing 3-5 steps with a railing?: 1  Total Score: 11       Therapeutic Activities and Exercises:   Patient performed BLE AROM ex 2 x 10 reps-APs, ankle circles, hip abd/add, heel slides, SLR, GS; bed mob and Gait as above.    Patient left HOB elevated with all lines intact, call button in reach, bed alarm on and nurse present..    GOALS:    Physical Therapy Goals        Problem: Physical Therapy Goal    Goal Priority Disciplines Outcome Goal Variances Interventions   Physical Therapy Goal     PT/OT, PT Ongoing (interventions implemented as appropriate)     Description:  Goals to be met by: 2018     Patient will increase functional independence with mobility by performin. Supine to sit with Stand-by Assistance  2. Sit to supine with Stand-by Assistance  3. Sit to stand transfer with Stand-by Assistance  4. Gait  x 300 feet with Stand-by Assistance using Rolling Walker.                 Problem: Physical Therapy Goal    Goal Priority Disciplines Outcome Goal Variances Interventions   Physical Therapy Goal     PT/OT, PT                      Time Tracking:     PT Received On: 05/10/18  PT Start Time: 1603     PT Stop Time: 1636  PT Total  Time (min): 33 min     Billable Minutes: Therapeutic Activity 15 minutes and Therapeutic Exercise 18 minutes    Treatment Type: Treatment  PT/PTA: PT     PTA Visit Number: 0     Avril Velez, PT  05/10/2018

## 2018-05-11 NOTE — PLAN OF CARE
Problem: Patient Care Overview  Goal: Plan of Care Review  Outcome: Ongoing (interventions implemented as appropriate)  Pt's SpO2 96% on 1 lpm NC. No adverse reactions to aerosol tx. No respiratory distress noted. Continue to monitor SpO2.

## 2018-05-11 NOTE — PT/OT/SLP PROGRESS
Physical Therapy Treatment    Patient Name:  Elena Patton   MRN:  [id number]    Recommendations:     Discharge Recommendations:  nursing facility, skilled   Discharge Equipment Recommendations: bedside commode   Barriers to discharge: Decreased caregiver support    Assessment:     Elena Patton is a 77 y.o. female admitted with a medical diagnosis of Bilateral pulmonary embolism.  She presents with the following impairments/functional limitations:  weakness, impaired endurance, impaired self care skills, impaired functional mobilty, gait instability, impaired balance, impaired cardiopulmonary response to activity, pain, decreased safety awareness, impaired cognition, decreased lower extremity function, decreased upper extremity function Patient with improved BP by end of tx; however, pt's LEs functionally weaker; barely able to amb 5ft to the bedside chair; initial BP sup in bed 88/66 , sitting EOB post ex BP 99/68 ; after amb /84  O2 sats 92% on RA; will cont with POC..    Rehab Prognosis:  good; patient would benefit from acute skilled PT services to address these deficits and reach maximum level of function.      Recent Surgery: Procedure(s) (LRB):  Venogram (N/A)      Plan:     During this hospitalization, patient to be seen 6 x/week to address the above listed problems via gait training, therapeutic activities, therapeutic exercises  · Plan of Care Expires:  06/05/18   Plan of Care Reviewed with: patient, family    Subjective     Communicated with nurse prior to session.  Patient found supine with HOB elevated upon PT entry to room, agreeable to treatment.      Pain/Comfort:  · Pain Rating 1:  (c/o generalized pain BLEs, R>L; did not rate)    Patients cultural, spiritual, Restoration conflicts given the current situation: n/a    Objective:     Patient found with: peripheral IV, oxygen, marley catheter (ICU monitoring)     General Precautions: Standard, fall   Orthopedic Precautions:N/A    Braces: N/A     Functional Mobility:  · Bed Mobility:     · Scooting: moderate assistance  · Supine to Sit: moderate assistance, maximal assistance and with HOB elevated  · Transfers:    · Sit <>stand: modA using RW  · Gait: Patient barely amb 5ft with RW and maxA with posterior lean, LLE extended ahead and RLE flexed at knee and hip with placement lagging behind LLE      AM-PAC 6 CLICK MOBILITY  Turning over in bed (including adjusting bedclothes, sheets and blankets)?: 3  Sitting down on and standing up from a chair with arms (e.g., wheelchair, bedside commode, etc.): 2  Moving from lying on back to sitting on the side of the bed?: 2  Moving to and from a bed to a chair (including a wheelchair)?: 2  Need to walk in hospital room?: 1  Climbing 3-5 steps with a railing?: 1  Total Score: 11       Therapeutic Activities and Exercises:   Patient performed BLE AROM ex 12 reps each in supine and sitting including APs, heel slides, hip abd/add, SLR, FAQ, marches, trunk ext; transfers and amb as above; nurse had to assist with scooting pt back into the chair as the pt was too close to the edge; pt very fatigued following sessioin    Patient left up in chair with all lines intact, call button in reach and nudrse present..    GOALS:    Physical Therapy Goals        Problem: Physical Therapy Goal    Goal Priority Disciplines Outcome Goal Variances Interventions   Physical Therapy Goal     PT/OT, PT Ongoing (interventions implemented as appropriate)     Description:  Goals to be met by: 2018     Patient will increase functional independence with mobility by performin. Supine to sit with Stand-by Assistance  2. Sit to supine with Stand-by Assistance  3. Sit to stand transfer with Stand-by Assistance  4. Gait  x 300 feet with Stand-by Assistance using Rolling Walker.                 Problem: Physical Therapy Goal    Goal Priority Disciplines Outcome Goal Variances Interventions   Physical Therapy Goal     PT/OT, PT                       Time Tracking:     PT Received On: 05/11/18  PT Start Time: 1350     PT Stop Time: 1413  PT Total Time (min): 23 min     Billable Minutes: Therapeutic Activity 10 minutes and Therapeutic Exercise 13 minutes    Treatment Type: Treatment  PT/PTA: PT     PTA Visit Number: 0     Avril Velez, PT  05/11/2018

## 2018-05-11 NOTE — CONSULTS
"  [facility]  Adult Nutrition  Consult Note    SUMMARY     Recommendations    Calorie count:  Day 1= 239 kcal & 12g protein    Recommendation/Intervention:   1. Consider appetite stimulant.   2. Add Boost pudding bid.   3. Continue to encourage intake at meals.   4. If intake remains poor, pt would benefot from enteral feeds.    Goals:   Pt will consume at least 50% intake at meals  Nutrition Goal Status: progressing towards goal  Communication of RD Recs: reviewed with RN (Ivis)    Reason for Assessment  Reason for Assessment: consult, RD follow-up  Diagnosis:  (B pulmonary embolism)  Relevant Medical History: HTN, renal disorder, thyroid disease, CAD, cardiac surgery, appendectomy  General Information Comments: Pt on dysphagia soft Cardiac diet. pt with poor intake at meals. States she doesn;t like Boost. Day 1 calorie count= 239 kcal & 12g protein.     Nutrition Risk Screen  Nutrition Risk Screen: no indicators present    Nutrition/Diet History  Food Preferences: no Advent or cultural food prefs identified  Do you have any cultural, spiritual, Advent conflicts, given your current situation?: n/a  Factors Affecting Nutritional Intake: decreased appetite    Anthropometrics  Temp: 96.7 °F (35.9 °C)  Height Method: Stated  Height: 5' 3" (160 cm)  Height (inches): 63 in  Weight Method: Bed Scale  Weight: 52.1 kg (114 lb 13.8 oz)  Weight (lb): 114.86 lb  Ideal Body Weight (IBW), Female: 115 lb  % Ideal Body Weight, Female (lb): 93.75 lb  BMI (Calculated): 19.1  BMI Grade: 18.5-24.9 - normal     Lab/Procedures/Meds  Pertinent Labs Reviewed: reviewed  Pertinent Labs Comments: Na 133L, Ca 8.6L, Alb 1.7L  Pertinent Medications Reviewed: reviewed  Pertinent Medications Comments: aspirin, 5% dex at 75    Physical Findings/Assessment  Overall Physical Appearance: weak  Tubes:  (none)  Oral/Mouth Cavity: WDL  Skin:  (Charbel 13-intact)    Estimated/Assessed Needs  Weight Used For Calorie Calculations: " 52.1 kg (114 lb 13.8 oz)  Energy Calorie Requirements (kcal): 5080-3789  Energy Need Method: Kcal/kg (30-35 kcal/kg)  Protein Requirements: 63g (1.2g/kg)  Weight Used For Protein Calculations: 52.1 kg (114 lb 13.8 oz)  Fluid Need Method: RDA Method  RDA Method (mL): 1563     Nutrition Prescription Ordered  Current Diet Order: dysphagia soft Cardiac  Oral Nutrition Supplement: Boost Plus    Evaluation of Received Nutrient/Fluid Intake  Other Calories (kcal): 306  Energy Calories Required: not meeting needs  Protein Required: not meeting needs  Fluid Required: not meeting needs  Comments: LBM 5/8  % Intake of Estimated Energy Needs: 0 - 25 %  % Meal Intake: 0 - 25 %    Nutrition Risk  Level of Risk/Frequency of Follow-up:  (2xweekly)     Assessment and Plan  Nutrition Problem  Inadequate energy intake    Related to (etiology):   Decreased appetite    Signs and Symptoms (as evidenced by):   Poor po intake    Interventions/Recommendations (treatment strategy):  See recs    Nutrition Diagnosis Status:   Continues     Monitor and Evaluation  Food and Nutrient Intake: food and beverage intake  Food and Nutrient Adminstration: diet order  Physical Activity and Function: nutrition-related ADLs and IADLs  Anthropometric Measurements: weight  Biochemical Data, Medical Tests and Procedures: electrolyte and renal panel  Nutrition-Focused Physical Findings: overall appearance     Nutrition Follow-Up  RD Follow-up?: Yes

## 2018-05-11 NOTE — PLAN OF CARE
Problem: SLP Goal  Goal: SLP Goal  SLP Short Term Goals:  1. Patient will consume >50% dental soft diet with thin liquids with no overt s/s of aspiration given mod-max assist.-ongoing  2. Patient will implement safe swallowing strategies 100% of the time during meals given mod-max assist.     Outcome: Outcome(s) achieved Date Met: 05/11/18  Patient continues to present with minimal po intake and no overt s/s of aspiration. Patient reporting difficulty chewing most dental soft items, and she is refusing pureed diet. She reports the Boost made her vomit and is 'space food.'  Patient reports she will eat: mashed potatoes, soups, and carrots cooked very well. Recommend continued dental soft diet to provide more options, discuss options for meals with patient to order items she will eat, encourage family to bring soft food/soup options from home, strict aspiration precautions, and encourage po intake. Registered Dietician following. No further skilled acute Speech Therapy services warranted at this time. Please re-consult as needed.  KAMAR Meneses., CCC-SLP  Pager: [phone]  05/11/2018

## 2018-05-11 NOTE — PT/OT/SLP PROGRESS
Occupational Therapy   Treatment    Name: Elena Patton  MRN: [id number]  Admitting Diagnosis:  Bilateral pulmonary embolism       Recommendations:     Discharge Recommendations: nursing facility, skilled  Discharge Equipment Recommendations:  bedside commode  Barriers to discharge:  Decreased caregiver support    Subjective     Communicated with: nsg prior to session.  Pain/Comfort:  · Pain Rating 1:  (generalized complaints of pain in BLEs; did not rate )    Patients cultural, spiritual, Synagogue conflicts given the current situation:      Objective:     Patient found with:      General Precautions: Standard, fall   Orthopedic Precautions:N/A   Braces: N/A     Occupational Performance:    Bed Mobility:    · Patient completed Scooting/Bridging with moderate assistance  · Patient completed Sit to Supine with maximal assistance     Functional Mobility/Transfers:  · Patient completed Sit <> Stand Transfer with moderate assistance  with  rolling walker   · Patient completed Bed <> Chair Transfer using Step Transfer technique with maximal assistance with rolling walker  · Functional Mobility: Max a steps to bed from b/s chair with RW; posterior leaning     Activities of Daily Living:  · N/A     Patient left supine with all lines intact, call button in reach, bed alarm on and nsg notified    [facility] 6 Click:  [facility] Total Score: 14    Treatment & Education:  Pt found [facility]IC; reports significant fatigue at this time and requesting to return to supine. Tolerating 45 min in b/s chair   Step t/f b/s chair -->bed Max A with RW with posterior lean; pt leaning posteriorly to unsafely bring self to supine requiring maximal assist to complete sit-->sup   2 x 10 BUE ROM/therex without resistance   Education:  Pt remains with SOB, elevated HR with minimal axs     Assessment:     Elena Patton is a 77 y.o. female with a medical diagnosis of Bilateral pulmonary embolism.  She presents with deconditioning .  Performance deficits affecting  function are weakness, gait instability, decreased lower extremity function, impaired balance, impaired endurance, impaired cardiopulmonary response to activity, decreased ROM, decreased upper extremity function, impaired functional mobilty, impaired self care skills, edema, pain, decreased safety awareness, impaired cognition.  Pt remains with limited tolerance and decreased endurance. Pt found [facility]IC and requesting to return to supine. Performed bed level therex after t/f from chair. Cont OT POC     Rehab Prognosis:  Good ; patient would benefit from acute skilled OT services to address these deficits and reach maximum level of function.       Plan:     Patient to be seen 5 x/week to address the above listed problems via self-care/home management, therapeutic activities, therapeutic exercises  · Plan of Care Expires: 06/05/18  · Plan of Care Reviewed with: patient, family    This Plan of care has been discussed with the patient who was involved in its development and understands and is in agreement with the identified goals and treatment plan    GOALS:    Occupational Therapy Goals        Problem: Occupational Therapy Goal    Goal Priority Disciplines Outcome Interventions   Occupational Therapy Goal     OT, PT/OT Ongoing (interventions implemented as appropriate)    Description:  Goals to be met by: 6/5/18     Patient will increase functional independence with ADLs by performing:    LE Dressing with Minimal Assistance.  Grooming while standing with Contact Guard Assistance.  Toileting from bedside commode with Contact Guard Assistance for hygiene and clothing management.   Supine to sit with Contact Guard Assistance.  Stand pivot transfers with Contact Guard Assistance.  Toilet transfer to bedside commode with Contact Guard Assistance.  Increased functional strength to WFL for self care skills and functional mobility.  Upper extremity exercise program x10 reps per handout, with independence.                       Time Tracking:     OT Date of Treatment: 05/11/18  OT Start Time: 1455  OT Stop Time: 1511  OT Total Time (min): 16 min    Billable Minutes:Therapeutic Activity 16    Lucy Sanchez OT  5/11/2018

## 2018-05-11 NOTE — PLAN OF CARE
Problem: Physical Therapy Goal  Goal: Physical Therapy Goal  Goals to be met by: 2018     Patient will increase functional independence with mobility by performin. Supine to sit with Stand-by Assistance  2. Sit to supine with Stand-by Assistance  3. Sit to stand transfer with Stand-by Assistance  4. Gait  x 300 feet with Stand-by Assistance using Rolling Walker.         Outcome: Ongoing (interventions implemented as appropriate)  Patient with improved BP by end of tx; however, pt's LEs functionally weaker; barely able to amb 5ft to the bedside chair; initial BP sup in bed 88/66 , sitting EOB post ex BP 99/68 ; after amb /84  O2 sats 92% on RA; will cont with POC.

## 2018-05-11 NOTE — PT/OT/SLP PROGRESS
Physical Therapy      Patient Name:  Elena Patton   MRN:  [id number]  [id number]  Patient not seen at this time secondary to eating. Will follow-up .    Avril Velez, PT   5/11/2018

## 2018-05-11 NOTE — PLAN OF CARE
Problem: Occupational Therapy Goal  Goal: Occupational Therapy Goal  Goals to be met by: 6/5/18     Patient will increase functional independence with ADLs by performing:    LE Dressing with Minimal Assistance.  Grooming while standing with Contact Guard Assistance.  Toileting from bedside commode with Contact Guard Assistance for hygiene and clothing management.   Supine to sit with Contact Guard Assistance.  Stand pivot transfers with Contact Guard Assistance.  Toilet transfer to bedside commode with Contact Guard Assistance.  Increased functional strength to [facility] for self care skills and functional mobility.  Upper extremity exercise program x10 reps per handout, with independence.     Outcome: Ongoing (interventions implemented as appropriate)  Pt remains with limited tolerance and decreased endurance. Pt found [facility] and requesting to return to supine. Performed bed level therex after t/f from chair. Cont OT POC

## 2018-05-11 NOTE — PLAN OF CARE
TN went to meet with patient, no family at bedside.  Patient reported her son came to visit this morning.   TN reviewed patient's clinicals, plan to step down today. TN faxed patient's updated clinicals to [facility] via [facility]. TN called and left message for son Zach to update him as well. TN will continue to follow.    Plan is to discharge back to [facility] skilled.     Future Appointments  Date Time Provider Department Center   5/14/2018 10:40 AM Zach Corona MD PhD [facility] CARDIO [place]   5/22/2018 2:00 PM YMaria Del Rosario Tuttle NP DESC URO [place]        05/11/18 1246   Discharge Reassessment   Assessment Type Discharge Planning Reassessment   Provided patient/caregiver education on the expected discharge date and the discharge plan No   Discharge Plan A Skilled Nursing Facility   Discharge Plan B New Nursing Home placement - assisted care facility     Fabiola Dowling RN  Transition Navigator  [phone]

## 2018-05-11 NOTE — PT/OT/SLP PROGRESS
"Speech Language Pathology Treatment  Discharge    Patient Name:  Elena Patton   MRN:  [id number]   [id number] A    Admitting Diagnosis: Bilateral pulmonary embolism    Recommendations:                 General Recommendations:  Registered Dietician following for poor po intake  Diet recommendations:  Finely chopped meat, Dental Soft, Liquid Diet Level: Thin   Aspiration Precautions: encourage po intake during all meals , HOB to 90 degrees and Standard aspiration precautions, small single bites with dental soft items, check for oral pocketing after meals  General Precautions: Standard, fall  Communication strategies:  none    Recommend continued dental soft diet to provide more options to encourage po intake, discuss options for meals with patient to order items she will eat, encourage family to bring soft food/soup options from home, strict aspiration precautions, and encourage patient to completely finish a minimum of 1 item from each tray. Registered Dietician following.    Subjective     Patient awake and cooperative.   Patient goals: to get better    Pain/Comfort:  · Pain Rating 1: 0/10    Objective:     Has the patient been evaluated by SLP for swallowing?   Yes  Keep patient NPO? No   Current Respiratory Status: room air      Patient assessed with bites of single peaches x8 and sips of lemonade x3 with no overt s/s of aspiration. Patient then stated, "That's enough." SLP encouraged patient to attempt 2 more bites. Patient agreed, however, eventually spit out trials.     Patient continues to present with minimal po intake and no overt s/s of aspiration. Patient reporting difficulty chewing most dental soft items, and she is refusing pureed diet. Her dentures (not present in hospital) are loose in her oral cavity despite attempts to utilize denture glue at home. She is not getting new dentures. She stated, "They are trying to charge me $500 for new ones." She reports the Boost made her vomit, and is 'space food.'  " "Patient agreed to finish at least 1 item from every meal tray. Patient stated, "My family brought me some soup. It just needs to be heated up." SLP encouraged patient to reconsider getting new dentures to improve po intake.     Patient reports she will eat: mashed potatoes, soups, and carrots cooked very well. Recommend continued dental soft diet to provide more options, discuss options for meals with patient to order items she will eat, encourage family to bring soft food/soup options from home, strict aspiration precautions, and encourage po intake.    SLP returned to patient's room to add peach trials to calorie count, and patient found eating chicken salad sandwich. SLP encouraged patient to finish either bread, chicken salad, or Jello from tray. Patient in agreement. No overt s/s of aspiration with 2 bites of chicken salad sandwich noted.      Further education provided on SLP role, s/s and risks of aspiraiton, safe swallow precautions, and d/c from ST services. Patient verbalized understanding.     Assessment:     Elena Patton is a 77 y.o. female with no overt s/s of aspiration. Patient reporting difficulty with chewing dental soft consistencies without dentition, however is refusing pureed diet. Recommendations to increase po intake discussed with patient. RD following. No further skilled acute Speech Therapy services warranted at this time. Please re-consult as needed.    Goals:    SLP Goals     Not on file          Multidisciplinary Problems (Resolved)        Problem: SLP Goal    Goal Priority Disciplines Outcome   SLP Goal   (Resolved)     SLP Outcome(s) achieved   Description:  SLP Short Term Goals:  1. Patient will consume >50% dental soft diet with thin liquids with no overt s/s of aspiration given mod-max assist.-ongoing  2. Patient will implement safe swallowing strategies 100% of the time during meals given mod-max assist.                      Plan:     · Patient to be seen:  3 x/week   · Plan of " Care expires:  06/03/18  · Plan of Care reviewed with:  patient (RN Ilda)   · SLP Follow-Up:  No       Discharge recommendations:  nursing facility, skilled   Barriers to Discharge: poor po intake    Time Tracking:     SLP Treatment Date:   05/11/18  Speech Start Time:  1105  Speech Stop Time:  1121     Speech Total Time (min):  16 min    Billable Minutes: Treatment Swallowing Dysfunction 16    LUCY Call, CCC-SLP   Spectra: [phone]  05/11/2018

## 2018-05-11 NOTE — PLAN OF CARE
Problem: Nutrition, Imbalanced: Inadequate Oral Intake (Adult)  Goal: Improved Oral Intake  Patient will demonstrate the desired outcomes by discharge/transition of care.  Outcome: Ongoing (interventions implemented as appropriate)  Calorie count:  Day 1= 239 kcal & 12g protein    Recommendation/Intervention:   1. Consider appetite stimulant.   2. Add Boost pudding bid.   3. Continue to encourage intake at meals.   4. If intake remains poor, pt would benefot from enteral feeds.    Goals:   Pt will consume at least 50% intake at meals  Nutrition Goal Status: progressing towards goal  Communication of RD Recs: reviewed with RN (Ivis)

## 2018-05-11 NOTE — PLAN OF CARE
Problem: Fall Risk (Adult)  Goal: Identify Related Risk Factors and Signs and Symptoms  Related risk factors and signs and symptoms are identified upon initiation of Human Response Clinical Practice Guideline (CPG)   Outcome: Ongoing (interventions implemented as appropriate)  Pt is very weak when up on feet, needs asst at all times, 2 people. PT/OT working with pt.

## 2018-05-11 NOTE — PLAN OF CARE
Pt was complaining of lower abdominal pain, stating she feels like she has to pee. Pt's pelvic area was taut to palpation. Bladder scan was performed, >500 mL was scanned multiple times. RN attempted to get current marley to drain and irrigated with no success. Pt's current marley was removed and pt was still unable to void. Another marley was placed with >400 mL output immediately. Pt reports abdominal pain resolved. Will continue to monitor.

## 2018-05-12 NOTE — NURSING
Orthostatic VS not done d/t pt too lethargic to stand and c/o pain when sitting/standing from right leg. BP normal when pt lying in bed with hob elevated 60- 90 degrees.

## 2018-05-12 NOTE — PLAN OF CARE
Problem: Nutrition, Imbalanced: Inadequate Oral Intake (Adult)  Goal: Identify Related Risk Factors and Signs and Symptoms  Related risk factors and signs and symptoms are identified upon initiation of Human Response Clinical Practice Guideline (CPG)   Outcome: Ongoing (interventions implemented as appropriate)  Pt has no appetite, only takes a few bites of each meal and then refuses any more.

## 2018-05-12 NOTE — NURSING
Report called to RN on 5. Pt transferring to room 527. VSS, nadn. N o changes today in pts status.

## 2018-05-12 NOTE — PROGRESS NOTES
Progress Note  [facility]    Admit Date: 5/3/2018   LOS: 8 days     SUBJECTIVE:     Follow-up For:  Extensive bilateral PE and R-sided pleural effusion     Patient interviewed and examined this morning. No new complaints today. Still having right calf pain, but improves with percocet. Denies SOB or chest pain. UOP 1175 cc in last 24 hrs, net + 1.7 L since admit.  Rodriguez had to be re-inserted for urinary retention.     Scheduled Meds:   albuterol-ipratropium 2.5mg-0.5mg/3mL  3 mL Nebulization Q6H    amiodarone  200 mg Oral BID    apixaban  10 mg Oral BID    [START ON 5/17/2018] apixaban  5 mg Oral BID    aspirin  325 mg Oral Daily    cetirizine  10 mg Oral Daily    DULoxetine  30 mg Oral Daily    gabapentin  600 mg Oral TID    levothyroxine  25 mcg Oral Before breakfast    lidocaine  1 patch Transdermal Q24H    lubiprostone  24 mcg Oral BID WM    pantoprazole  40 mg Oral Daily     Continuous Infusions:    PRN Meds:acetaminophen, benzonatate, bisacodyl, hydrOXYzine HCl, ibuprofen, oxyCODONE-acetaminophen, simethicone, sodium chloride 0.9%    Review of patient's allergies indicates:  No Known Allergies    Review of Systems  Pos: right calf pain  Neg: CP, SOB    OBJECTIVE:     Vital Signs (Most Recent)  Temp: 98 °F (36.7 °C) (05/12/18 0330)  Pulse: (!) 111 (05/12/18 0750)  Resp: 19 (05/12/18 0750)  BP: 108/74 (05/12/18 0730)  SpO2: 96 % (05/12/18 0750)    Vital Signs Range (Last 24H):  Temp:  [96.7 °F (35.9 °C)-98 °F (36.7 °C)]   Pulse:  [108-124]   Resp:  [16-39]   BP: ()/(64-93)   SpO2:  [78 %-98 %]     I & O (Last 24H):    Intake/Output Summary (Last 24 hours) at 05/12/18 0823  Last data filed at 05/12/18 0704   Gross per 24 hour   Intake              680 ml   Output             1075 ml   Net             -395 ml     Wt Readings from Last 3 Encounters:   05/08/18 52.1 kg (114 lb 13.8 oz)   04/26/18 48.8 kg (107 lb 9.4 oz)   12/11/17 49 kg (108 lb)     Physical Exam:  Gen: NAD  HEENT: NC,  AT  Chest: decreased breath sounds b/l;  mild bibasilar rales b/l   CVS: irregular irregular rhythm, no m/r/g  Abdomen: soft, NT, ND, no masses, +BS  Ext: no edema, pulses 2+   Skin: ro rashes  Neuro: A&O x 3, CN's grossly intact, sensation intact to light touch  Psych: Normal mood, affect, thought content    Laboratory:  LABS  CBC    Recent Labs  Lab 05/10/18  0341 05/11/18  0332 05/12/18  0332   WBC 7.09 9.03 10.90   RBC 3.71* 3.71* 3.98*   HGB 11.0* 10.9* 11.7*   HCT 34.8* 34.1* 36.7*    338 377*   MCV 94 92 92   MCH 29.6 29.4 29.4   MCHC 31.6* 32.0 31.9*     BMP    Recent Labs  Lab 05/10/18  0341 05/11/18  0332 05/12/18  0332   * 130* 130*   K 4.8 4.7 4.7   CO2 25 23 22*    101 98   BUN 12 11 10   CREATININE 0.8 0.7 0.7   GLU 98 80 85         Recent Labs  Lab 05/08/18  0405  05/10/18  0341 05/11/18  0332 05/12/18  0332   CALCIUM 8.1*  < > 8.6* 8.6* 8.7   MG 1.6  --   --   --   --    PHOS 3.1  --   --   --   --    < > = values in this interval not displayed.  LFT    Recent Labs  Lab 05/10/18  0341 05/11/18  0332 05/12/18  0332   PROT 5.2* 4.9* 5.1*   ALBUMIN 1.7* 1.6* 1.7*   BILITOT 0.3 0.5 0.5   AST 12 12 16   ALKPHOS 89 93 105   ALT 8* 8* 8*       COAGS    Recent Labs  Lab 05/09/18  0324 05/09/18  1149 05/10/18  0341   APTT 69.6* 51.2* 57.4*       LAST HbA1c  Lab Results   Component Value Date    HGBA1C 4.8 04/19/2018         Diagnostic Results:    IP CONSULT TO CARDIOLOGY  IP CONSULT TO PULMONOLOGY  IP CONSULT TO CARDIOLOGY-[facility]  IP CONSULT TO NEPHROLOGY-[facility]  IP CONSULT TO REGISTERED DIETITIAN/NUTRITIONIST    ASSESSMENT/PLAN:     Neuro/Psych:  · A&O x 3  · Avoid sedating pain meds if possible- percocet dose yesterday appeared to cause worsening of condition  · Pt is on cymbalta and amytriptiline. Stopped amytriptiline as this can lead to orthostatic hypotension.     Cardiovascular: (Hypertension, Chronic atrial fibrillation w/ hx of RVR)   · Home meds include amiodarone and diltiazem.       Continue Amio, will restart Diltiazem for A-fib as BP's have improved   · ECHO 4/2018 w/ EF 45%, grade 3 diastolic dysfunction    ECHO 5/2018: EF 10%, severe MR, mod TR  · Right heart strain evident on CTA  · Pt with pacemaker    Cardiology was re-consulted and feels the orthostatic hypotension is 2/2 needing fluids    Given MIVF's per Nephro and Cards recs with improvement of BP's however symptomatic orthostatic hypotension persists   and pt becomes very dizzy when standing up to work with PT.    She continues to be a significant fall risk     Pulmonary: (Bilat Pulm Embolisms, ?Pneumonia)   · DuoNeb q 6 hours scheduled  · ABx: completed course for pneumonia   Moderate right-sided pleural effusion drained by Pulm, removed 2L    Maintaining O2 saturations on 2L NC    GI/NUT:   · Cardiac diet with thin liquids per nutritionist    Continue Boost Plus with all meals    Albumin 1.7  · GI Proph -- Protonix 40 mg PO    Patient is not eating much, poor nutrition      Renal/Electrolytes:   · UOP 1175 cc in last 24 hrs, net + 1.7 L since admit   · marley re-inserted for urinary retention. Will attempt bladder training    Infectious Disease: (pneumonia)   · Completed course of Abx for possible pneumonia   · WBC now normal   · Blood cultures NGTD, urine cultures growing candida, asymptomatic   will hold of on diflucan for now as can cause QT prolongation and patient is already on amiodarone    Pt with hx of ESBL UTI    Hem/Coag: (Anemia, DVT Proph)   · Current Hgb =       · 11.7  ·  H&H has been stable  ·  Cards on board; s/p cath lab thrombolysis     Cards recommended eliquis 10 mg PO BID x 7 days, then 5 mg PO BID     Endocrine:   · Conservative glycemic control, goal < 180 mg/dl     Musculoskeletal/Skin:   ·Sacral erythema  ·Repositioning per ICU protocol    Chronic back and b/l foot pain with Hx of neuropathy on percocet 5 mg and gabapentin at home   Continue gabapentin but giving percocet sparingly    Dispo: Transfer to  floor, bladder training     5/12/2018 Tushar Cho MD  7:43 AM

## 2018-05-13 NOTE — PLAN OF CARE
Problem: Patient Care Overview  Goal: Plan of Care Review  Outcome: Ongoing (interventions implemented as appropriate)  bbs improved cont to monitor

## 2018-05-13 NOTE — PLAN OF CARE
Problem: Patient Care Overview  Goal: Plan of Care Review  No acute events occurred throughout shift. Cultures drawn in the am. IV lasix administered with considerable increase in UOP (>100/hr for 4 hours). Amio loading dose administered with no changes to heart rate or rhythm; see chart for strips. IV dig administered. KUB and CXR's completed. Plan of care reviewed with pt and pt's family at 1400. All questions and concerns addressed. See flowsheets for VS and assessment information. Will continue to monitor.

## 2018-05-13 NOTE — PROGRESS NOTES
Progress Note  [facility]  Admit Date: 5/3/2018   LOS: 9 days   SUBJECTIVE:   Follow-up For:  Extensive bilateral PE and R sided pleural effusion    Patient seen and examined this AM.  Patient with complaints of Right calf pain.   Complains of SOB.      ROS  Pos:  Right calf pain  Neg:  Negative for fever, headache, nausea, vomiting, chest pain, abdominal pain, dysuria, constipation, diarrhea     albuterol-ipratropium 2.5mg-0.5mg/3mL  3 mL Nebulization Q6H    amiodarone  200 mg Oral BID    apixaban  10 mg Oral BID    [START ON 5/17/2018] apixaban  5 mg Oral BID    aspirin  325 mg Oral Daily    cetirizine  10 mg Oral Daily    DULoxetine  30 mg Oral Daily    gabapentin  600 mg Oral TID    levothyroxine  25 mcg Oral Before breakfast    lidocaine  1 patch Transdermal Q24H    lubiprostone  24 mcg Oral BID WM    pantoprazole  40 mg Oral Daily     OBJECTIVE:   Vital Signs (Most Recent)  Temp: 97.6 °F (36.4 °C) (05/13/18 0330)  Pulse: (!) 120 (05/13/18 0700)  Resp: (!) 26 (05/13/18 0700)  BP: (!) 132/91 (05/13/18 0630)  SpO2: (!) 93 % (05/13/18 0700)    I & O (Last 24H):  Intake/Output Summary (Last 24 hours) at 05/13/18 0755  Last data filed at 05/13/18 0600   Gross per 24 hour   Intake           1332.5 ml   Output              655 ml   Net            677.5 ml     Wt Readings from Last 3 Encounters:   05/13/18 56.5 kg (124 lb 9 oz)   04/26/18 48.8 kg (107 lb 9.4 oz)   12/11/17 49 kg (108 lb)       Current Diet Order   Procedures    Diet Dysphagia Soft (IDDSI Level 6) [facility]; Cardiac (Low Na/Chol); Thin     Order Specific Question:   Indicate patient location for additional diet options:     Answer:   [facility]     Order Specific Question:   Additional Diet Options:     Answer:   Cardiac (Low Na/Chol)     Order Specific Question:   Fluid consistency:     Answer:   Thin        Physical Exam  GEN:  NAD  HEENT:  MAXIMUS PERRL OP Clear  CV: irregular irregular rhythm, tachy no m/r/g  RESP:   Decreased breath sounds b/l; mild bibasilar rales b/l  ABD:  Soft NT/ND +BS  NEURO:  No neurological deficits.    EXT:  No edema, 2+ pulses    Laboratory Data:  CBC    Recent Labs  Lab 05/11/18  0332 05/12/18  0332 05/13/18  0403   WBC 9.03 10.90 14.28*   RBC 3.71* 3.98* 4.23   HGB 10.9* 11.7* 12.3   HCT 34.1* 36.7* 38.8    377* 418*   MCV 92 92 92   MCH 29.4 29.4 29.1   MCHC 32.0 31.9* 31.7*     CMP    Recent Labs  Lab 05/11/18  0332 05/12/18  0332 05/13/18  0403   CALCIUM 8.6* 8.7 8.8   PROT 4.9* 5.1* 5.4*   * 130* 130*   K 4.7 4.7 4.2   CO2 23 22* 23    98 98   BUN 11 10 9   CREATININE 0.7 0.7 0.7   ALKPHOS 93 105 115   ALT 8* 8* 7*   AST 12 16 15   BILITOT 0.5 0.5 0.5     POCT-Glucose  No results found for: POCTGLUCOSE  COAGS    Recent Labs  Lab 05/09/18  0324 05/09/18  1149 05/10/18  0341   APTT 69.6* 51.2* 57.4*     UA  Urinalysis  No results for input(s): COLORU, CLARITYU, SPECGRAV, PHUR, PROTEINUA, GLUCOSEU, BILIRUBINCON, BLOODU, WBCU, RBCU, BACTERIA, MUCUS, NITRITE, LEUKOCYTESUR, UROBILINOGEN, HYALINECASTS in the last 24 hours.  MICRO  Microbiology Results (last 7 days)     Procedure Component Value Units Date/Time    Culture, Body Fluid - Bactec [[id number]] Collected:  05/06/18 1445    Order Status:  Completed Specimen:  Body Fluid from Thoracentesis Fluid Updated:  05/11/18 1812     Body Fluid Culture, Sterile No growth after 5 days.    Blood culture x two cultures. Draw prior to antibiotics. [[id number]] Collected:  05/03/18 2240    Order Status:  Completed Specimen:  Blood from Peripheral, Forearm, Right Updated:  05/09/18 0612     Blood Culture, Routine No growth after 5 days.    Narrative:       Aerobic and anaerobic    Blood culture x two cultures. Draw prior to antibiotics. [[id number]] Collected:  05/03/18 5713    Order Status:  Completed Specimen:  Blood from Peripheral, Antecubital, Right Updated:  05/09/18 0612     Blood Culture, Routine No growth after 5 days.    Narrative:        Aerobic and anaerobic    Gram stain [[id number]] Collected:  05/06/18 1445    Order Status:  Completed Specimen:  Body Fluid from Pleural Fluid Updated:  05/06/18 1909     Gram Stain Result Few WBC's      No organisms seen        LIPID PANEL  Lab Results   Component Value Date    CHOL 110 (L) 04/23/2018     Lab Results   Component Value Date    HDL 19 (L) 04/23/2018     Lab Results   Component Value Date    LDLCALC 64.6 04/23/2018     Lab Results   Component Value Date    TRIG 132 04/23/2018     Lab Results   Component Value Date    CHOLHDL 17.3 (L) 04/23/2018       Diagnostic Results:  Imaging in last 24 hours:  Imaging Results          CTA Chest Non-Coronary (PE Study) (Final result)  Result time 05/04/18 00:07:19    Final result by Trevin Burleson MD (05/04/18 00:07:19)                 Impression:      Extensive bilateral pulmonary embolism with evidence of right heart strain.    Large right-sided pleural effusion with associated compressive atelectasis.    Air bronchograms within the right lower lobe may represent some component of infectious pneumonia.    Peripheral airspace opacity in the left lower lobe may represent areas of pulmonary infarction.    Given the degree of atelectasis suggest outpatient PET-CT scan after the patient's symptoms resolve to look for primary pulmonary tumor.    Case discussed with Dr. Moseley at 05/04/2018 on 12:05 a.m.      Electronically signed by: Trevin Burleson MD  Date:    05/04/2018  Time:    00:07             Narrative:    EXAMINATION:  CTA CHEST NON CORONARY    CLINICAL HISTORY:  Bacterial pneumonia, not elsewhere classified;    TECHNIQUE:  Low dose axial images, sagittal and coronal reformations were obtained from the thoracic inlet to the lung bases following the IV administration of 100 mL of Omnipaque 350.  Contrast timing was optimized to evaluate the pulmonary arteries.  MIP images were performed.    COMPARISON:  Chest x-ray dated 05/03/2018 factors    FINDINGS:  CT  pulmonary embolism examination:    There are extensive filling defects throughout the pulmonary tree involving the segmental vessels to the right upper, right lower, left upper and left lower lobes.  No saddle embolism component is present.  There is bowing of the interventricular septum to the left with dilatation of the right-sided chambers.  There is also reflux of contrast into the IVC and the azygos system.    CT chest examination:    The thyroid gland is unremarkable.  The base of the neck is within normal limits.  The supraclavicular regions are unremarkable.    The trachea is unremarkable.  No definitive endobronchial lesion is identified on the left.  Evaluation for endobronchial lesion involving the right middle and lower lobes is significantly difficult given the atelectatic changes present.    There is a lower lead pacemaker present with the tips terminating in the right atrium and right ventricle.  No pericardial effusions identified.  There is enlargement of the right-sided chambers as described above.    The thoracic aorta is normal in caliber.  There is an aberrant right subclavian artery.  The remainder of the great vessels appear unremarkable.    There is no evidence of lymphadenopathy in the chest.  The axillary regions are within normal limits.    There is a large right-sided pleural effusion with associated compressive atelectasis.  There is trace left-sided pleural effusion.  There is no evidence of a pneumothorax.  There is no evidence of pneumomediastinum.    There is diffuse emphysema.  There also air bronchograms within the right lower lobe.  There are peripheral airspace opacities in the left lower lobe.  There are scattered ground-glass airspace opacities in the remainder of the lung fields.    The esophagus is within normal limits.  There is nodular appearance of the liver.  The remainder of the visualized upper abdominal structures are unremarkable.    The chest wall is within normal  limits.  The osseous structures are within normal limits.                               X-Ray Chest AP Portable (Final result)  Result time 05/03/18 21:27:25    Final result by Tino Moody MD (05/03/18 21:27:25)                 Impression:      1. Moderate right pleural effusion with associated compressive atelectasis and/or lower lobe consolidation.  Finding is superimposed upon edema bilaterally.  Correlation advised.      Electronically signed by: Tino Moody MD  Date:    05/03/2018  Time:    21:27             Narrative:    EXAMINATION:  XR CHEST AP PORTABLE    CLINICAL HISTORY:  Chest pain, unspecified    TECHNIQUE:  Single frontal view of the chest was performed.    COMPARISON:  04/22/2018    FINDINGS:  The cardiomediastinal silhouette is not enlarged noting calcification of the aorta.  Left chest wall pacer noted..  There is a right pleural effusion, noting possible trace left effusion..  The trachea is midline.  The lungs are symmetrically expanded bilaterally with patchy increased interstitial and parenchymal attenuation projected over the right hemothorax.  There is left basilar subsegmental atelectasis or scarring.  Superimposed right lower lung zone consolidation not excluded..  There is no pneumothorax.  The osseous structures are remarkable for degenerative change..                                ASSESSMENT/PLAN:   Elena Patton is a 77 y.o. female with a past medical history of     Neuro  A&O x 3  Will avoid sedating meds if possible  Continue cymbalta, amytriptiline held 2/2 possible orthostatic HTN    Cardiovascular  Continue Amio for A-fib, diltiazem held   Patient with pacemaker  5/4/18 Echo shows     1 - Severely depressed left ventricular systolic function (EF 10-15%).     2 - Moderately depressed right ventricular systolic function .     3 - The estimated PA systolic pressure is 29 mmHg.     4 - Mild aortic stenosis, FREDDY = 1.62 cm2, mean gradient = 3 mmHg.   Patient with R heart strain  evident on CTA  Per cards, gentle hydration was recommended however symptomatic orthostatic hypotension persists  Cards recommend giving 500 cc over 4 hours and rechecking orthostatics     Pulmonary  Patient admitted with Bilat Pulm Embolisms, ?Pneumonia  Continue DuoNebs q6 hours  Pt completed couse of antibiotics for pna  Moderated right sided pleural effusion drained by Pulm, removed 2L  Patient O2 sat 93% on 2L NC     Renal   (0.5) in last 24 hours, +2.3 L since admit  Continue Bladder Training     Heme/ID  H&H stable 12/38  Continue eliquis   WBC elevated 14, will get UA, cultures and CXR    FEN/GI  Will replete electrolytes as needed  Diet dysphagia soft, cardiac     Endo  BS < 180      5/13/2018 Jerad Ivey MD  7:55 AM

## 2018-05-13 NOTE — PLAN OF CARE
Problem: Physical Therapy Goal  Goal: Physical Therapy Goal  Goals to be met by: 2018     Patient will increase functional independence with mobility by performin. Supine to sit with Stand-by Assistance  2. Sit to supine with Stand-by Assistance  3. Sit to stand transfer with Stand-by Assistance  4. Gait  x 300 feet with Stand-by Assistance using Rolling Walker.         Outcome: Ongoing (interventions implemented as appropriate)  Functional activities limited this day. Nurse only agreeable to supine therex to BUE and LLE only due to pt /c recurrent RLE DVT. Pt /c SOB & fatigue /c  Therex. Resolved /c rest. Cont POC.

## 2018-05-13 NOTE — PT/OT/SLP PROGRESS
Physical Therapy Treatment    Patient Name:  Elena Patton   MRN:  [id number]    Recommendations:     Discharge Recommendations:  nursing facility, skilled   Discharge Equipment Recommendations: bedside commode   Barriers to discharge: Decreased caregiver support    Assessment:     Elena Patton is a 77 y.o. female admitted with a medical diagnosis of Bilateral pulmonary embolism.  She presents with the following impairments/functional limitations:  weakness, impaired endurance, impaired self care skills, impaired functional mobilty, gait instability, decreased lower extremity function, decreased upper extremity function, pain, impaired cardiopulmonary response to activity, impaired cognition, decreased safety awareness, edema, decreased ROM, impaired balance. Functional activities limited due to nurse Hai & charge nurse Lisa only agreeable to BUE's and LLE's supine therex only as pt /c recurrent DVT in RLE that has not yet resolved. Pt /c SOB & fatigue /c therex /c rest breaks required. SaO2 at rest 94% & decreased to mid 80's /c therex. Improved to 94% /c rest. Cont POC to progress pt towards established LTG.    Rehab Prognosis:  Fair; patient would benefit from acute skilled PT services to address these deficits and reach maximum level of function.      Recent Surgery: Procedure(s) (LRB):  Venogram (N/A)      Plan:     During this hospitalization, patient to be seen 6 x/week to address the above listed problems via therapeutic activities, gait training, therapeutic exercises  · Plan of Care Expires:  06/05/18   Plan of Care Reviewed with: patient    Subjective     Communicated with nurse Valles & charge nurse Gonzalez prior to session.  Patient found in bed upon PT entry to room, agreeable to treatment.      Chief Complaint: RLE pain  Patient comments/goals: she's going to stay with her son in [facility] upon d/c  Pain/Comfort:  · Pain Rating 1: 9/10  · Location - Side 1: Right  · Location - Orientation 1:  lower  · Location 1: leg  · Pain Addressed 1:  (nurse Valles present & aware)  · Pain Rating Post-Intervention 1: 9/10    Patients cultural, spiritual, Anglican conflicts given the current situation: n/a    Objective:     Patient found with: oxygen, marley catheter (ICU monitoring)     General Precautions: Standard, fall   Orthopedic Precautions:N/A   Braces: N/A     Functional Mobility:  Did not perform      AM-PAC 6 CLICK MOBILITY  Turning over in bed (including adjusting bedclothes, sheets and blankets)?: 3  Sitting down on and standing up from a chair with arms (e.g., wheelchair, bedside commode, etc.): 2  Moving from lying on back to sitting on the side of the bed?: 2  Moving to and from a bed to a chair (including a wheelchair)?: 2  Need to walk in hospital room?: 1  Climbing 3-5 steps with a railing?: 1  Total Score: 11       Therapeutic Activities and Exercises:   BUE's semi-supine therex x10 reps each for strengthening and endurance: bicep curls, horizontal shoulder abd, shoulder add, shldr shrugs, forearm pronation/supination, wrist flex/ext, shoulder flex. Rest breaks required due to fatigue & SOB. Resolved /c rest.  SaO2 on 2L of O2 at rest: 94%, 121 bpm.  SaO2 /c therex on 2L of O2: mid 80's, 127 bpm. SaO2 improved /c rest: 93-94%.  BP supine initial tx session: 135/89, 121 bpm.    Patient left supine with call button in reach, nurse Valles notified and phlebotomist present..    GOALS:    Physical Therapy Goals        Problem: Physical Therapy Goal    Goal Priority Disciplines Outcome Goal Variances Interventions   Physical Therapy Goal     PT/OT, PT Ongoing (interventions implemented as appropriate)     Description:  Goals to be met by: 2018     Patient will increase functional independence with mobility by performin. Supine to sit with Stand-by Assistance  2. Sit to supine with Stand-by Assistance  3. Sit to stand transfer with Stand-by Assistance  4. Gait  x 300 feet with Stand-by  Assistance using Rolling Walker.                 Problem: Physical Therapy Goal    Goal Priority Disciplines Outcome Goal Variances Interventions   Physical Therapy Goal     PT/OT, PT                      Time Tracking:     PT Received On: 05/13/18  PT Start Time: 0848     PT Stop Time: 0901  PT Total Time (min): 13 min     Billable Minutes: Therapeutic Exercise 13    Treatment Type: Treatment  PT/PTA: PTA     PTA Visit Number: 1     Licha Maynard, PTA  05/13/2018

## 2018-05-14 PROBLEM — I95.1 ORTHOSTATIC HYPOTENSION: Status: ACTIVE | Noted: 2018-01-01

## 2018-05-14 PROBLEM — Z51.5 PALLIATIVE CARE ENCOUNTER: Status: ACTIVE | Noted: 2018-01-01

## 2018-05-14 PROBLEM — Z71.89 GOALS OF CARE, COUNSELING/DISCUSSION: Status: ACTIVE | Noted: 2018-01-01

## 2018-05-14 PROBLEM — Z71.89 COUNSELING REGARDING ADVANCED CARE PLANNING AND GOALS OF CARE: Status: ACTIVE | Noted: 2018-01-01

## 2018-05-14 PROBLEM — A41.9 SEPSIS: Status: RESOLVED | Noted: 2018-01-01 | Resolved: 2018-01-01

## 2018-05-14 PROBLEM — Z51.5 HOSPICE CARE: Status: ACTIVE | Noted: 2018-01-01

## 2018-05-14 NOTE — EICU
Notified by house staff of increasing O2 requirement    78 y/o F combined systolic and diastolic HR EF 10-15% Grade 3 diastolic dysfunction presented with pleuritic chest pain found to have bilateral PE s/p catheter directed thrombolysis 5/5, underwent right sided thoracentesis 5/6.    Camera assessment: patient seen asleep, reportedly was awake earlier with family around.    Telemetry /83  HR 120s to 130s, given amiodarone and digoxin    CXR: moderate right sided effusion with bilateral congestive changes    · Agree with Bipap and diuresis  · Await ABG  · May need albumin with furosemide if no response to furosemide alone

## 2018-05-14 NOTE — SIGNIFICANT EVENT
Notified pt's sats going down into mid-range 80's with RR in high teens (computer reading high 30's).  HR continues to be in 120's-130's.  Pt lethargic, but arousable to voice.  Will quickly go back to sleep.    Spoke to Dr. Mary earlier in the evening regarding plans from Cardiology perspective.  Pt had received loading dose amiodarone today, PO amiodarone 200 mg daily and digoxin 50 mcg today.  Cardiology will assess the pt tomorrow AM.    Spoke to E-ICU to confirm plan for more aggressive diuresis, ABG, and bipap.  Confirmed it was best to continue metoprolol tartrate 25 mg BID as she has already been on this.  ABG planned in order to titrate bipap.    Notified son of pt's progressive decline in clinical condition and recommended he come in to discuss plan of care goals with team tomorrow.

## 2018-05-14 NOTE — PLAN OF CARE
Problem: Nutrition, Imbalanced: Inadequate Oral Intake (Adult)  Goal: Identify Related Risk Factors and Signs and Symptoms  Related risk factors and signs and symptoms are identified upon initiation of Human Response Clinical Practice Guideline (CPG)  Outcome: Ongoing (interventions implemented as appropriate)  Recommendation/Intervention:   1. Noted family considering hospice.   2. Encourage po intake as tolerated.    Goals:   Pt will consume at least 50% intake at meals  Nutrition Goal Status:  (continues)  Communication of RD Recs: reviewed with RN (Ivis)

## 2018-05-14 NOTE — PROGRESS NOTES
Progress Note  [facility]  Admit Date: 5/3/2018   LOS: 10 days   SUBJECTIVE:   Follow-up For:  Extensive bilateral PE and R sided pleural effusion    Patient seen and examined this AM. Worsened respiratory status overnight, now on Bipap. UOP 1850 in last 24 hrs. Patient is still responsive and can talk but much more difficult.     ROS  Pos:  Right calf pain, SOB  Neg:  Negative for fever, headache, nausea, vomiting, chest pain, abdominal pain, dysuria, constipation, diarrhea     albuterol-ipratropium 2.5mg-0.5mg/3mL  3 mL Nebulization Q6H    amiodarone  200 mg Oral BID    apixaban  10 mg Oral BID    [START ON 5/17/2018] apixaban  5 mg Oral BID    aspirin  325 mg Oral Daily    cetirizine  10 mg Oral Daily    DULoxetine  30 mg Oral Daily    furosemide  80 mg Intravenous BID    gabapentin  600 mg Oral TID    levothyroxine  25 mcg Oral Before breakfast    lidocaine  1 patch Transdermal Q24H    lubiprostone  24 mcg Oral BID WM    pantoprazole  40 mg Oral Daily    piperacillin-tazobactam (ZOSYN) IVPB  4.5 g Intravenous Q8H     OBJECTIVE:   Vital Signs (Most Recent)  Temp: 98.2 °F (36.8 °C) (05/14/18 0710)  Pulse: (!) 112 (05/14/18 1200)  Resp: (!) 33 (05/14/18 1200)  BP: 99/69 (05/14/18 1200)  SpO2: (!) 92 % (05/14/18 1200)    I & O (Last 24H):    Intake/Output Summary (Last 24 hours) at 05/14/18 1230  Last data filed at 05/14/18 1223   Gross per 24 hour   Intake              270 ml   Output             1661 ml   Net            -1391 ml     Wt Readings from Last 3 Encounters:   05/14/18 55.7 kg (122 lb 12.7 oz)   04/26/18 48.8 kg (107 lb 9.4 oz)   12/11/17 49 kg (108 lb)       Current Diet Order   Procedures    Diet Dysphagia Soft (IDDSI Level 6) [facility]; Cardiac (Low Na/Chol); Thin     Order Specific Question:   Indicate patient location for additional diet options:     Answer:   [facility]     Order Specific Question:   Additional Diet Options:     Answer:   Cardiac (Low Na/Chol)      Order Specific Question:   Fluid consistency:     Answer:   Thin        Physical Exam  GEN:  NAD  HEENT:  MAXIMUS PERRL OP Clear  CV: irregular irregular rhythm, tachy no m/r/g  RESP:  Decreased breath sounds b/l; mild bibasilar rales b/l  ABD:  Soft NT/ND +BS  NEURO:  No neurological deficits.    EXT:  1+ edema b/l, 2+ pulses    Laboratory Data:  CBC    Recent Labs  Lab 05/12/18  0332 05/13/18  0403 05/14/18  0329   WBC 10.90 14.28* 27.52*   RBC 3.98* 4.23 4.16   HGB 11.7* 12.3 12.1   HCT 36.7* 38.8 37.6   * 418* 487*   MCV 92 92 90   MCH 29.4 29.1 29.1   MCHC 31.9* 31.7* 32.2     CMP    Recent Labs  Lab 05/12/18  0332 05/13/18  0403 05/14/18  0329   CALCIUM 8.7 8.8 9.0   PROT 5.1* 5.4* 5.5*   * 130* 131*   K 4.7 4.2 3.7   CO2 22* 23 23   CL 98 98 95   BUN 10 9 10   CREATININE 0.7 0.7 0.8   ALKPHOS 105 115 118   ALT 8* 7* 8*   AST 16 15 16   BILITOT 0.5 0.5 0.4     POCT-Glucose  No results found for: POCTGLUCOSE  COAGS    Recent Labs  Lab 05/09/18  0324 05/09/18  1149 05/10/18  0341   APTT 69.6* 51.2* 57.4*     MICRO  Microbiology Results (last 7 days)     Procedure Component Value Units Date/Time    Urine culture [[id number]] Collected:  05/13/18 0853    Order Status:  Completed Specimen:  Urine from Urine, Catheterized Updated:  05/14/18 1116     Urine Culture, Routine --     PRESUMPTIVE E COLI  >100,000 cfu/ml  Identification and susceptibility pending      Blood culture (site 2) [[id number]] Collected:  05/13/18 0920    Order Status:  Completed Specimen:  Blood Updated:  05/13/18 2115     Blood Culture, Routine No Growth to date    Narrative:       Site # 2, aerobic only    Blood culture (site 1) [[id number]] Collected:  05/13/18 0926    Order Status:  Completed Specimen:  Blood Updated:  05/13/18 2115     Blood Culture, Routine No Growth to date    Narrative:       Site # 1, aerobic and anaerobic    Blood culture [[id number]]     Order Status:  Canceled Specimen:  Blood     Culture, Body Fluid -  Bactec [[id number]] Collected:  05/06/18 1445    Order Status:  Completed Specimen:  Body Fluid from Thoracentesis Fluid Updated:  05/11/18 1812     Body Fluid Culture, Sterile No growth after 5 days.    Blood culture x two cultures. Draw prior to antibiotics. [[id number]] Collected:  05/03/18 2240    Order Status:  Completed Specimen:  Blood from Peripheral, Forearm, Right Updated:  05/09/18 0612     Blood Culture, Routine No growth after 5 days.    Narrative:       Aerobic and anaerobic    Blood culture x two cultures. Draw prior to antibiotics. [[id number]] Collected:  05/03/18 2250    Order Status:  Completed Specimen:  Blood from Peripheral, Antecubital, Right Updated:  05/09/18 0612     Blood Culture, Routine No growth after 5 days.    Narrative:       Aerobic and anaerobic        LIPID PANEL  Lab Results   Component Value Date    CHOL 110 (L) 04/23/2018     Lab Results   Component Value Date    HDL 19 (L) 04/23/2018     Lab Results   Component Value Date    LDLCALC 64.6 04/23/2018     Lab Results   Component Value Date    TRIG 132 04/23/2018     Lab Results   Component Value Date    CHOLHDL 17.3 (L) 04/23/2018       Diagnostic Results:  Imaging in last 24 hours:  Imaging Results          CTA Chest Non-Coronary (PE Study) (Final result)  Result time 05/04/18 00:07:19    Final result by Trevin Burleson MD (05/04/18 00:07:19)                 Impression:      Extensive bilateral pulmonary embolism with evidence of right heart strain.    Large right-sided pleural effusion with associated compressive atelectasis.    Air bronchograms within the right lower lobe may represent some component of infectious pneumonia.    Peripheral airspace opacity in the left lower lobe may represent areas of pulmonary infarction.    Given the degree of atelectasis suggest outpatient PET-CT scan after the patient's symptoms resolve to look for primary pulmonary tumor.    Case discussed with Dr. Moseley at 05/04/2018 on 12:05  a.mMel      Electronically signed by: Trevin Burleson MD  Date:    05/04/2018  Time:    00:07             Narrative:    EXAMINATION:  CTA CHEST NON CORONARY    CLINICAL HISTORY:  Bacterial pneumonia, not elsewhere classified;    TECHNIQUE:  Low dose axial images, sagittal and coronal reformations were obtained from the thoracic inlet to the lung bases following the IV administration of 100 mL of Omnipaque 350.  Contrast timing was optimized to evaluate the pulmonary arteries.  MIP images were performed.    COMPARISON:  Chest x-ray dated 05/03/2018 factors    FINDINGS:  CT pulmonary embolism examination:    There are extensive filling defects throughout the pulmonary tree involving the segmental vessels to the right upper, right lower, left upper and left lower lobes.  No saddle embolism component is present.  There is bowing of the interventricular septum to the left with dilatation of the right-sided chambers.  There is also reflux of contrast into the IVC and the azygos system.    CT chest examination:    The thyroid gland is unremarkable.  The base of the neck is within normal limits.  The supraclavicular regions are unremarkable.    The trachea is unremarkable.  No definitive endobronchial lesion is identified on the left.  Evaluation for endobronchial lesion involving the right middle and lower lobes is significantly difficult given the atelectatic changes present.    There is a lower lead pacemaker present with the tips terminating in the right atrium and right ventricle.  No pericardial effusions identified.  There is enlargement of the right-sided chambers as described above.    The thoracic aorta is normal in caliber.  There is an aberrant right subclavian artery.  The remainder of the great vessels appear unremarkable.    There is no evidence of lymphadenopathy in the chest.  The axillary regions are within normal limits.    There is a large right-sided pleural effusion with associated compressive atelectasis.   There is trace left-sided pleural effusion.  There is no evidence of a pneumothorax.  There is no evidence of pneumomediastinum.    There is diffuse emphysema.  There also air bronchograms within the right lower lobe.  There are peripheral airspace opacities in the left lower lobe.  There are scattered ground-glass airspace opacities in the remainder of the lung fields.    The esophagus is within normal limits.  There is nodular appearance of the liver.  The remainder of the visualized upper abdominal structures are unremarkable.    The chest wall is within normal limits.  The osseous structures are within normal limits.                               X-Ray Chest AP Portable (Final result)  Result time 05/03/18 21:27:25    Final result by Tino Moody MD (05/03/18 21:27:25)                 Impression:      1. Moderate right pleural effusion with associated compressive atelectasis and/or lower lobe consolidation.  Finding is superimposed upon edema bilaterally.  Correlation advised.      Electronically signed by: Tino Moody MD  Date:    05/03/2018  Time:    21:27             Narrative:    EXAMINATION:  XR CHEST AP PORTABLE    CLINICAL HISTORY:  Chest pain, unspecified    TECHNIQUE:  Single frontal view of the chest was performed.    COMPARISON:  04/22/2018    FINDINGS:  The cardiomediastinal silhouette is not enlarged noting calcification of the aorta.  Left chest wall pacer noted..  There is a right pleural effusion, noting possible trace left effusion..  The trachea is midline.  The lungs are symmetrically expanded bilaterally with patchy increased interstitial and parenchymal attenuation projected over the right hemothorax.  There is left basilar subsegmental atelectasis or scarring.  Superimposed right lower lung zone consolidation not excluded..  There is no pneumothorax.  The osseous structures are remarkable for degenerative change..                                ASSESSMENT/PLAN:   Elena pisano  77 y.o. female here with b/l PE's and large pleural effusions.    Neuro  A&O x 3  Will avoid sedating meds if possible  Continue cymbalta, amytriptiline held 2/2 possible orthostatic HTN    Cardiovascular  Continue Amio for A-fib, diltiazem held   Patient with pacemaker  5/4/18 Echo shows     1 - Severely depressed left ventricular systolic function (EF 10-15%).     2 - Moderately depressed right ventricular systolic function .     3 - The estimated PA systolic pressure is 29 mmHg.     4 - Mild aortic stenosis, FREDDY = 1.62 cm2, mean gradient = 3 mmHg.   Patient with R heart strain evident on CTA  Per cards, gentle hydration was recommended however symptomatic orthostatic hypotension persists  Patient is now volume overloaded and has been started on lasix, f/u UOP    Pulmonary  Patient admitted with Bilat Pulm Embolisms, ?Pneumonia  Continue DuoNebs q6 hours  Pt completed couse of antibiotics for pna  Moderated right sided pleural effusion drained by Pulm, removed 2L  Patient now on Bipap, titrate if possible    Renal  UOP 1850 in last 24 hours  Continue Bladder Training     Heme/ID  H&H stable   Continue eliquis   WBC elevated to 27 today, UA showing UTI, f/u cultures and start Abx    FEN/GI  Will replete electrolytes as needed  NPO for now while on Bipap     Endo  BS < 180    Patient has poor prognosis. After speaking with family this morning we have initiated palliative care consult with goal of hospice. Family is in agreement that they want her comfortable. Patient is full DNR.    Dispo: F/u palliative care consult and SW for hospice placement    5/14/2018 Tushar Cho MD  7:55 AM

## 2018-05-14 NOTE — PHYSICIAN QUERY
PT Name: Elena Patton  MR #: [id number]    Physician Query Form - Nutrition Clarification     CDS/: Luz Lawler RN CDI      Contact information: [email or web address]     This form is a permanent document in the medical record.     Query Date: May 14, 2018    By submitting this query, we are merely seeking further clarification of documentation.. Please utilize your independent clinical judgment when addressing the question(s) below.    The Medical record contains the following:   Indicators  Supporting Clinical Findings Location in Medical Record   X % of Estimated Energy Intake over a time frame from p.o., TF, or TPN % Intake of Estimated Energy Needs: 0 - 25 % Nutrition consult 5/14 224 pm    Weight Status over a time frame      Subcutaneous Fat and/or Muscle Loss      Fluid Accumulation or Edema      Reduced  Strength     X Wt / BMI / Usual Body Weight WT = 55.7 kg  BMI = 19.1   Nutrition consult 5/14 224 pm    Delayed Wound Healing / Failure to Thrive     X Acute or Chronic Illness Bilateral pulmonary embolism  HTN, renal disorder, thyroid disease, CAD, cardiac surgery, appendectomy   Nutrition consult 5/14 224 pm    Medication     X Treatment Recommendation/Intervention:   1. Consider appetite stimulant.   2. Add Boost pudding bid.   3. Continue to encourage intake at meals.   4. If intake remains poor, pt would benefot from enteral feeds.   Nutrition consult 5/14 224 pm   X Other She was discharged to the nursing home due to her weakness.     Appears older than stated age, frail, cachectic    H&P     AND / ASPEN Clinical Characteristics (October 2011)  A minimum of two characteristics is recommended for diagnosing either moderate or severe malnutrition   Mild Malnutrition Moderate Malnutrition Severe Malnutrition   Energy Intake from p.o., TF or TPN. < 75% intake of estimated energy needs for less than 7 days < 75% intake of estimated energy needs for greater than 7 days < 50% intake of estimated energy  needs for > 5 days   Weight Loss 1-2% in 1 month  5% in 3 months  7.5% in 6 months  10% in 1 year 1-2 % in 1 week  5% in 1 month  7.5% in 3 months  10% in 6 months  20% in 1 year > 2% in 1 week  > 5% in 1 month  > 7.5% in 3 months  > 10% in 6 months  > 20% in 1 year   Physical Findings     None *Mild subcutaneous fat and/or muscle loss  *Mild fluid accumulation  *Stage II decubitus  *Surgical wound or non-healing wound *Mod/severe subcutaneous fat and/or muscle loss  *Mod/severe fluid accumulation  *Stage III or IV decubitus  *Non-healing surgical wound     Provider, please specify diagnosis or diagnoses associated with above clinical findings.    [ ] Mild Protein-Calorie Malnutrition    [x ] Moderate Protein-Calorie Malnutrition    [ ] Other Nutritional Diagnosis (please specify): ____________________________________    [ ] Other: ________________________________    [ ] Clinically Undetermined      Please document in your progress notes daily for the duration of treatment until resolved and include in your discharge summary.

## 2018-05-14 NOTE — CONSULTS
"Consult Note  Palliative Care      Consult Requested By: Riky Santos III, MD  Reason for Consult: Hospice discussion     Thank you for the consult and the opportunity to participate in Ms. Patton care    SUBJECTIVE:     History of Present Illness:  Disease Process:Bilateral pulmonary embolism     Ms. Patton 76 y/o female admitted to [facility] on 5/3/2018. PMHx CAD w/pacemaker, chronic A fib, CHF, EF (10-15%) and ESBL UTI's. She was transported to the ED from [facility] with left sided chest pain that ws reproducible with palpation. Recent admission 4/19/2018-4/26/18 at [facility] for pneumonia and ESBL UTI.     CTA resulted bilateral pulmonary embolism with evidence of right heart strain.  Cardiology recommended heparin drip and ICU observation.      Pulmonary- hypoxic respiratory failure in the setting of EF 10-15% along with grade III diastolic dysfunction, severe MR, bilateral PE and chronic AF. CXR pulmonary edema and lager R sided pleural effusion.      Palliative care met with patient and family. Found patient lying in bed with family at bedside. Son, grandson, daughter-in-law.Family able to verbalize patient condition and stated they wanted patient to be comfortable. Patient on 3L O2 per N/C.  Respirations are in 30's and tachycardic at this time. Patient  requiring BIPAP through out night for hypoxia.     Palliative care discussed hospice options. Grandson francisco javier stated, "She wanted to go home." Patient lives in [place], [place]. Son was apprehensive about having mother moved to [place] and opted to place her in his home in [place], [place]. Patient became unstable to move. Palliative care educated family   on current vitals and status. Family was agreeable to have patient have hospital services in hospital. Palliative care educated family on in hospital hospice services      Patient to unstable for transfer requiring in hospital hospice at this time.      Primary team and case " manager notified of discharge plan. [facility] consulted for in hospital hospice.       Past Medical History:   Diagnosis Date    Anticoagulant long-term use     Atrial fibrillation     Coronary artery disease     Hypertension     Renal disorder     Thyroid disease      Past Surgical History:   Procedure Laterality Date    APPENDECTOMY      CARDIAC SURGERY      HYSTERECTOMY      JOINT REPLACEMENT       History reviewed. No pertinent family history.  Social History   Substance Use Topics    Smoking status: Never Smoker    Smokeless tobacco: Never Used    Alcohol use No       Mental Status: Disoriented    ECOG Performance Status Grade: 4 - Completely disabled    Review of Systems:  Review of systems not obtained due to patient factors BIPAP.    OBJECTIVE:     Pain Assessment: No pain reported at this time    Decision-Making Capacity: Family answered questions, Patient unable to communicate due to disease severity/cognitive impairment    Advanced Directives:  Living Will: Yes. Copy on chart: Yes  Do Not Resuscitate Status: Yes  Medical Power of : No  Registered Organ Donor: No    Living Arrangements: Lives in nursing home        ASSESSMENT/PLAN:   Recommendations:  Comfort Care  Code Status: DNR  In Hospital Hospice          Palliative care will continue to follow to assist with goals of care.     Thank you for the opportunity to participate in Ms. Patton  care.      Time Spent:> 50% of  70 min. in visit spent in chart review, phone discussion of goals of care with family, symptom assessment, coordination of care and emotional support           Fanny Garcia, MSN, APRN, NP-C  Palliative Medicine  [phone]

## 2018-05-14 NOTE — PLAN OF CARE
Family Meeting scheduled for 09:30 am with [facility].     TN updated Dr. Metz and MD team.     TN also updated Anu Vasquez Supervisor and Michell (ICU Charge).    1630--TN rounded on patient, no family at bedside. Will follow-up tomorrow.     05/14/18 1552   Discharge Reassessment   Assessment Type Discharge Planning Reassessment   Discharge Plan A Other  (IP HOSPICE)     Fabiola Dowling RN  Transition Navigator  [phone]

## 2018-05-14 NOTE — PT/OT/SLP PROGRESS
Occupational Therapy  Visit Attempt     Patient Name:  Elena Patton   MRN:  [id number]    Patient not seen today secondary to nsg hold at this time; pt with decreased respiratory status; on bipap. Will follow-up as available.    Lucy Sanchez OT  5/14/2018

## 2018-05-14 NOTE — PLAN OF CARE
Problem: Dying Patient, Actively (Adult)  Goal: Identify Related Risk Factors and Signs and Symptoms  Related risk factors and signs and symptoms are identified upon initiation of Human Response Clinical Practice Guideline (CPG)   Outcome: Ongoing (interventions implemented as appropriate)  Pt is now comfort care, palliative care, hospice nurse to meet with family @ 0930 tomorrow morning.

## 2018-05-14 NOTE — PLAN OF CARE
Problem: Coping, Compromised Individual (Adult,Obstetrics,Pediatric)  Goal: Identify Related Risk Factors and Signs and Symptoms  Related risk factors and signs and symptoms are identified upon initiation of Human Response Clinical Practice Guideline (CPG)   Outcome: Ongoing (interventions implemented as appropriate)  Pt is now comfort care only, to be hospice tomorrow. Family aware and agreed with this transition of care. Comfort and emotional support provided for pt and family.

## 2018-05-14 NOTE — CONSULTS
"Consult Note  Palliative Care      Consult Requested By: Riky Santos III, MD  Reason for Consult: Hospice discussion    Thank you for the consult and the opportunity to participate in Ms. Patton care.    HPI     History of Present Illness:  Disease Process: Bilateral pulmonary embolism    Ms. Patton 78 y/o female admitted to [facility] on 5/3/2018. PMHx CAD w/pacemaker, chronic A fib, CHF, EF (10-15%) and ESBL UTI's. She was transported to the ED from [facility] with left sided chest pain that ws reproducible with palpation. Recent admission 4/19/2018-4/26/18 at [facility] for pneumonia and ESBL UTI.    CTA resulted bilateral pulmonary embolism with evidence of right heart strain.  Cardiology recommended heparin drip and ICU observation.     Pulmonary- hypoxic respiratory failure in the setting of EF 10-15% along with grade III diastolic dysfunction, severe MR, bilateral PE and chronic AF. CXR pulmonary edema and lager R sided pleural effusion.     Palliative care met with patient and family. Found patient lying in bed with family at bedside. Son, grandson, daughter-in-law.Family able to verbalize patient condition and stated they wanted patient to be comfortable. Patient on 3L O2 per N/C.  Respirations are in 30's and tachycardic at this time. Patient  requiring BIPAP through out night for hypoxia.    Palliative care discussed hospice options. Grandson francisco javier stated, "She wanted to go home." Patient lives in [place], [place]. Son was apprehensive about having mother moved to [place] and opted to place her in his home in [place], [place]. Patient became unstable to move. Palliative care educated family   on current vitals and status. Family was agreeable to have patient have hospital services in hospital. Palliative care educated family on in hospital hospice services     Patient to unstable for transfer requiring in hospital hospice at this time.     Primary team and  notified " of discharge plan. [facility] consulted for in hospital hospice.   Past Medical History:   Diagnosis Date    Anticoagulant long-term use     Atrial fibrillation     Coronary artery disease     Hypertension     Renal disorder     Thyroid disease      Past Surgical History:   Procedure Laterality Date    APPENDECTOMY      CARDIAC SURGERY      HYSTERECTOMY      JOINT REPLACEMENT       History reviewed. No pertinent family history.  Social History   Substance Use Topics    Smoking status: Never Smoker    Smokeless tobacco: Never Used    Alcohol use No       Mental Status: Disoriented    ECOG Performance Status Grade: 4 - Completely disabled        OBJECTIVE:     Pain Assessment: No pain reported at this time    Decision-Making Capacity: Family answered questions, Patient unable to communicate due to disease severity/cognitive impairment    Advanced Directives:  Living Will: No  Do Not Resuscitate Status: Yes  Medical Power of : No  Registered Organ Donor: No    Living Arrangements: Lives in nursing home    ASSESSMENT/PLAN:   Recommendations:  Comfort Care  Code Status: DNR  In Hospital Hospice          Palliative care will continue to follow to assist with goals of care.     Thank you for the opportunity to participate in Ms. Abdi huffman.      Time Spent:> 50% of  70 min. in visit spent in chart review, phone discussion of goals of care with family, symptom assessment, coordination of care and emotional support           Fanny Garcia, MSN, APRN, NP-C  Palliative Medicine  [phone]

## 2018-05-14 NOTE — PT/OT/SLP PROGRESS
Physical Therapy      Patient Name:  Elena Patton   MRN:  [id number]  [id number]  Patient not seen today secondary to nurse reports pt not appropriate for therapy at this time; pt on BIPAP at this time, volume overloaded. Will follow-up .    Avril Velez, PT

## 2018-05-14 NOTE — PLAN OF CARE
Discussed with family in length patients current status. The family is adamant the patient would never want breathing tube, chest compressions, or pressors to artificially stabilize her BP. The patient's family would like to pursue making the patient more comfortable and are willing to talk about hospice care. Will continue diuresis and morphine for pain control.    Gregorio Stokes MD  PGY-3   11:34 AM

## 2018-05-14 NOTE — PROGRESS NOTES
" [facility]  Adult Nutrition  Progress Note    SUMMARY       Recommendations    Recommendation/Intervention:   1. Noted family considering hospice.   2. Encourage po intake as tolerated.    Goals:   Pt will consume at least 50% intake at meals  Nutrition Goal Status:  (continues)  Communication of RD Recs: reviewed with RN (Ivis)    Reason for Assessment  Reason for Assessment: RD follow-up  Diagnosis:  (B pulmonary embolism)  Relevant Medical History: HTN, renal disorder, thyroid disease, CAD, cardiac surgery, appendectomy  General Information Comments: Pt on dysphagia soft Cardiac diet with Boost Plus. Pt with poor intake at recent meals. Pt now on bipap. Family consdering hospice.    Nutrition Risk Screen  Nutrition Risk Screen: no indicators present    Nutrition/Diet History  Food Preferences: no Yarsani or cultural food prefs identified  Do you have any cultural, spiritual, Yarsani conflicts, given your current situation?: n/a  Factors Affecting Nutritional Intake: decreased appetite    Anthropometrics  Temp: 98.2 °F (36.8 °C)  Height Method: Stated  Height: 5' 3" (160 cm)  Height (inches): 63 in  Weight Method: Bed Scale  Weight: 55.7 kg (122 lb 12.7 oz)  Weight (lb): 122.8 lb  Ideal Body Weight (IBW), Female: 115 lb  % Ideal Body Weight, Female (lb): 93.75 lb  BMI (Calculated): 19.1  BMI Grade: 18.5-24.9 - normal     Lab/Procedures/Meds  Pertinent Labs Reviewed: reviewed  Pertinent Labs Comments: Na 131L, Glu 124H, Alb 1.8L  Pertinent Medications Reviewed: reviewed  Pertinent Medications Comments: aspirin    Physical Findings/Assessment  Overall Physical Appearance: weak  Tubes:  (none)  Oral/Mouth Cavity: WDL  Skin:  (Charbel 13-intact)    Estimated/Assessed Needs  Weight Used For Calorie Calculations: 55.7 kg (122 lb 12.7 oz)  Energy Calorie Requirements (kcal): 2955-3863  Energy Need Method: Kcal/kg (30-35 kcal/kg)  Protein Requirements: 67g (1.2g/kg)  Weight Used For Protein " Calculations: 55.7 kg (122 lb 12.7 oz)  Fluid Need Method: RDA Method  RDA Method (mL): 1671     Nutrition Prescription Ordered  Current Diet Order: dysphagia soft Cardiac  Oral Nutrition Supplement: Boost Plus    Evaluation of Received Nutrient/Fluid Intake  Other Calories (kcal): 306  Energy Calories Required: not meeting needs  Protein Required: not meeting needs  Fluid Required: not meeting needs  Comments: LBM 5/14  % Intake of Estimated Energy Needs: 0 - 25 %  % Meal Intake: 0 - 25 %    Nutrition Risk  Level of Risk/Frequency of Follow-up:  (2xweekly)     Assessment and Plan  Nutrition Problem  Inadequate energy intake    Related to (etiology):   Decreased appetite    Signs and Symptoms (as evidenced by):   Poor po intake    Interventions/Recommendations (treatment strategy):  See recs    Nutrition Diagnosis Status:   Continues     Monitor and Evaluation  Food and Nutrient Intake: food and beverage intake  Food and Nutrient Adminstration: diet order  Physical Activity and Function: nutrition-related ADLs and IADLs  Anthropometric Measurements: weight  Biochemical Data, Medical Tests and Procedures: electrolyte and renal panel  Nutrition-Focused Physical Findings: overall appearance     Nutrition Follow-Up  RD Follow-up?: Yes

## 2018-05-14 NOTE — PROGRESS NOTES
Pulmonary & Critical Care Medicine Progress Note    Subjective:   Overnight, patient started to become hypoxic requiring BIPAP. Patient has not received diuresis over the last week until last night and this morning. This morning, patient is cachetic on BIPAP 10/5 saturating well. CXR shows some pulmonary edema and larger R sided pleural effusion.    Vital Signs:   Vitals:    05/14/18 1100   BP: 101/71   Pulse: (!) 111   Resp: (!) 23   Temp:      Fluid Balance:     Intake/Output Summary (Last 24 hours) at 05/14/18 1134  Last data filed at 05/14/18 0923   Gross per 24 hour   Intake              255 ml   Output             1830 ml   Net            -1575 ml     Physical Exam:   General: NAD, cooperative, on BIPAP  HEENT: AT/NC, PERRL, oral mucosa moist.   Neck: Supple  Cardiac: S1S2, RRR, brisk cap refill in distal extremities.  Respiratory: Rhonchi bilaterally  Abdomen: Soft, NT/ND. +BS.   Extremities: 4+ edema  Neuro: Grossly intact to brief exam.    Laboratory Studies:     Recent Labs  Lab 05/13/18  2150   PH 7.374   PCO2 39.9   PO2 79*   HCO3 23.3*   POCSATURATED 95   BE -2       Recent Labs  Lab 05/14/18  0329   WBC 27.52*   RBC 4.16   HGB 12.1   HCT 37.6   *   MCV 90   MCH 29.1   MCHC 32.2       Recent Labs  Lab 05/14/18  0329   *   K 3.7   CL 95   CO2 23   BUN 10   CREATININE 0.8     Microbiology Data:   Microbiology Results (last 7 days)     Procedure Component Value Units Date/Time    Urine culture [[id number]] Collected:  05/13/18 0853    Order Status:  Completed Specimen:  Urine from Urine, Catheterized Updated:  05/14/18 1116     Urine Culture, Routine --     PRESUMPTIVE E COLI  >100,000 cfu/ml  Identification and susceptibility pending      Blood culture (site 2) [[id number]] Collected:  05/13/18 0920    Order Status:  Completed Specimen:  Blood Updated:  05/13/18 2115     Blood Culture, Routine No Growth to date    Narrative:       Site # 2, aerobic only    Blood culture (site 1) [[id number]]  Collected:  05/13/18 0926    Order Status:  Completed Specimen:  Blood Updated:  05/13/18 2115     Blood Culture, Routine No Growth to date    Narrative:       Site # 1, aerobic and anaerobic    Blood culture [[id number]]     Order Status:  Canceled Specimen:  Blood     Culture, Body Fluid - Bactec [[id number]] Collected:  05/06/18 1445    Order Status:  Completed Specimen:  Body Fluid from Thoracentesis Fluid Updated:  05/11/18 1812     Body Fluid Culture, Sterile No growth after 5 days.    Blood culture x two cultures. Draw prior to antibiotics. [[id number]] Collected:  05/03/18 2240    Order Status:  Completed Specimen:  Blood from Peripheral, Forearm, Right Updated:  05/09/18 0612     Blood Culture, Routine No growth after 5 days.    Narrative:       Aerobic and anaerobic    Blood culture x two cultures. Draw prior to antibiotics. [[id number]] Collected:  05/03/18 2250    Order Status:  Completed Specimen:  Blood from Peripheral, Antecubital, Right Updated:  05/09/18 0612     Blood Culture, Routine No growth after 5 days.    Narrative:       Aerobic and anaerobic         Chest Imaging:   No new imaging.     Scheduled Medications:    albuterol-ipratropium 2.5mg-0.5mg/3mL  3 mL Nebulization Q6H    amiodarone  200 mg Oral BID    apixaban  10 mg Oral BID    [START ON 5/17/2018] apixaban  5 mg Oral BID    aspirin  325 mg Oral Daily    cefTRIAXone (ROCEPHIN) IVPB  1 g Intravenous Q24H    cetirizine  10 mg Oral Daily    DULoxetine  30 mg Oral Daily    furosemide  80 mg Intravenous BID    gabapentin  600 mg Oral TID    levothyroxine  25 mcg Oral Before breakfast    lidocaine  1 patch Transdermal Q24H    lubiprostone  24 mcg Oral BID WM    pantoprazole  40 mg Oral Daily       PRN Medications:   acetaminophen, benzonatate, bisacodyl, hydrOXYzine HCl, ibuprofen, morphine, oxyCODONE-acetaminophen, simethicone, sodium chloride 0.9%    Assessment & Plan:   Patient Active Problem List   Diagnosis    Atrial  fibrillation with RVR    Essential hypertension    Leukocytosis    Falls    Chronic combined systolic and diastolic heart failure    TIA due to embolism    Bilateral pulmonary embolism    Chest pain on breathing    Acute deep vein thrombosis (DVT) of both lower extremities    Orthostatic hypotension     This is a case of hypoxic respiratory failure in the setting of HFrEF (EF 10-15%) along with grade III diastolic dysfunction, severe MR, bilateral PE and chronic AF. Family discussion was held today - family wants patient to be comfortable - DNR/DNI with palliation.     Family medicine at bedside - lasix BID dosing started for comfort with plans to remove BIPAP once adequately diuresed. Otherwise, patient is being started on Morphine IV pushes to maintain comfort and decrease respiratory distress and air hunger.    At this time, a lot of her respiratory symptoms are likely contributed by severe HF and possible aspiration. Continue to treat with antibiotics as per family discussion (former ESBL in the urine) as long as family wants continued abx. This would cover any aspiration PNA in the lung as well.    Thank you for involving us in the care of this patient. We will sign off. Please call with any questions.    Yareli Jones MD  [employer] PCCM Fellow, PGY 4  [facility]  Pager: [phone]

## 2018-05-14 NOTE — PLAN OF CARE
TN spoke with Dr. Ivey regarding patient. He would like Palliative Care to speak with family. Referral to Palliative Care ordered.     TN spoke with Fanny (Palliative Care). She informed TN family interested in hospice with [facility] (inpatient here).     1332--TN spoke with Jazmine from [facility]. She will contact administration for approval. If approved, will contact patient's son.    Fabiola Dowling RN  Transition Navigator  [phone]

## 2018-05-14 NOTE — NURSING
All PO meds held. Pt too weak to adm. Coughing when swallowing water and states she can not breathe when off the bipap. Dr. Cho informed. Aware. No new orders given.

## 2018-05-14 NOTE — NURSING
Pt c/o pain, unable to take po meds, MD called. Spoke with Dr. Singh, informed of need for iv pain med and also that family would like to speak with MDs reagarding pts code status and poss. Comfort care at this point.  MD stated he would put in iv meds and come talk to family.

## 2018-05-14 NOTE — PT/OT/SLP PROGRESS
Occupational Therapy  D/c OT orders     Patient Name:  Elena Patton   MRN:  [id number]    Patient's therapy orders d/c by MD this date. Pt with decline in status. Has not met goals. Will remove pt from therapy's caseload at this time     Lucy Sanchez OT  5/14/2018

## 2018-05-14 NOTE — PT/OT/SLP PROGRESS
Physical Therapy/ Orders d/c'd by MD      Patient Name:  Elena Patton   MRN:  [id number]    Patient's therapy orders d/c by MD this date. Pt with decline in status. Has not met goals. Will remove pt from therapy's caseload at this time     Avril Velez, PT

## 2018-05-15 NOTE — SIGNIFICANT EVENT
Pronouncement of Death    May 15, 2018 at 5:00AM  Titi Hirsch MD- doctor on call pronouncing death  Cause of Death: Acute on Chronic Respiratory Failure 2/2 Decompensated Systolic Heart Failure    Pt was examined with her son, Zach, in the room.  It was noted that patient did not have a pulse, respirations, nor pupil response to light.    Family listened and accepted the news quietly.  Attending physician, Dr. Riky Santos and social work was notified.  Pastoral care will be notified if patient wishes.        Titi Hirsch  5:18 AM  05/15/2018

## 2018-05-15 NOTE — NURSING
2038- Family Medicine notified of Pt's pain level/discomfort, elevated HR, and appearing restless; MD to arrive to unit shortly to assess     0200- provider notified of hypotension and IV morphine stopped    0315- provider notified of VTach with pulse, decrease in O2 saturation, agonal breathing, and decrease in BP; Zach (son) notified    0415- son at BS; updated on POC     0500- asystole noted on monitor; Dr. Hirsch notified; Son at BS     Pronounced TOD- May 15, 2018 at 5:00AM    ANDREW notified (see flowsheet)  Paperwork filled out and copied, placed in patient chart

## 2018-05-15 NOTE — PLAN OF CARE
TN reviewed patient's clinicals. Patient passed away this morning. TN notified Jazmine with [facility]. She will have her bereavement person reach out to son to give added suuport.     05/15/18 0947   Final Note   Assessment Type Final Discharge Note   Discharge Disposition      Fabiola Dowling RN  Transition Navigator  [phone]

## 2018-05-15 NOTE — PT/OT/SLP PROGRESS
Physical Therapy/Discharge      Patient Name:  Elena Patton   MRN:  [id number]    Patient had not met goals,  this morning.    Avril Velez, PT

## 2018-05-18 LAB
BACTERIA BLD CULT: NORMAL
BACTERIA BLD CULT: NORMAL
HBV E AG SERPL QL IA: NORMAL

## 2018-05-18 NOTE — PHYSICIAN QUERY
PT Name: Elena Patton  MR #: [id number]    Physician Query Form - Respiratory Condition Clarification      CDS/: Luz Lawler RN CDI     Contact information:  [email or web address]     Answered in progress note 5/15    This form is a permanent document in the medical record.    Query Date: May 18, 2018    By submitting this query, we are merely seeking further clarification of documentation. Please utilize your independent clinical judgment when addressing the question(s) below.    The Medical record contains the following   Indicators   Supporting Clinical Findings Location in Medical Record   X   SOB, LOAIZA, Wheezing, Productive Cough, Use of Accessory Muscles, etc. Patient presents with  Shortness of Breath  To ER per [facility] with c/o SOB and chest, abd pain from [facility].   H&P   X   Acute/Chronic Illness Anticoagulant long-term use    Atrial fibrillation    Coronary artery disease    Hypertension    Renal disorder    Thyroid disease   H&P   X   Radiology Findings Cardiac and mediastinal silhouettes are unchanged.  Cardiac pacing device again noted.  There is a large right-sided pleural effusion which has increased in size from prior.  There is probable worsening associated atelectasis throughout the right lung.  Apparent worsening density in the retrocardiac left lung base may represent atelectasis versus pneumonia, correlate clinically.  Small left-sided effusion not excluded.  No pneumothorax visualized. 5/6 Chest xray   X   Respiratory Distress or Failure Acute on Chronic Respiratory Failure 2/2 Decompensated Systolic Heart Failure Hospital medicine progress note 5/15 512 am   X   Hypoxia or Hypercapnia Requiring supplemental oxygen  Hypoxic despite oxygen   Cardiology consult 5/4 906 am   X   RR         ABGs         O2 sat RR 18 - 20    -   Initial vitals - ER  RR 27 - 45    -   Vitals 5/14 - 5/15    O2 Sat - 95 -98%    -  Initial vitals - ER  O2 Sat - 74 - 91%   -  Vitals 5/14  "5/15    5/13 ABG  PH - 7.374  PCO2 - 39.9  PO2 - 79  HCO3 - 23.3   Vital signs   X   BiPAP/Intubation Patient seen and examined this AM. Worsened respiratory status overnight, now on Bipap. UOP 1850 in last 24 hrs. Patient is still responsive and can talk but much more difficult.  Progress note 5/14 1229   X   Supplemental O2 Requiring supplemental oxygen    Oxygen NC 1-5    Cardiology consult 5/4 906 am  Orders   X   Home O2, Oxygen Dependence She is on home O2 at 2 Liters at the nursing home already.  She had a recent hospitalization (4/19/18- 4/26/18) at [facility] for pneumonia    H&P   X   Treatment Pulse ox monitoring Orders      Other       Provider, please specify diagnosis or diagnoses associated with above clinical findings.          Provider, please further specify "Acute on chronic respiratory failure."      [    ] Acute and (on) Chronic Respiratory Failure with Hypoxia    [    ] Acute and (on) Chronic Respiratory Failure with Hypercapnia    [    ] Acute and (on) Chronic Respiratory Failure with Hypoxia and Hypercapnia    [    ] Other Respiratory Diagnosis (please specify): ________________________    [    ] Clinically Undetermined      Please document in your progress notes daily for the duration of treatment until resolved and include in your discharge summary.                                                                                 "

## 2018-05-18 NOTE — PHYSICIAN QUERY
"PT Name: Elena Patton  MR #: [id number]  Physician Query Form - Heart Condition Clarification     CDS/: Luz Lawler RN CDI     Contact information: [email or web address]    This form is a permanent document in the medical record.     Query Date: May 18, 2018    By submitting this query, we are merely seeking further clarification of documentation. Please utilize your independent clinical judgment when addressing the question(s) below.    The Medical record contains the following:   Indicators     Supporting Clinical Findings Location in Medical Record    Pulmonary HTN     X CXR findings: Cardiac and mediastinal silhouettes are unchanged.  Cardiac pacing device again noted.  There is a large right-sided pleural effusion which has increased in size from prior.  There is probable worsening associated atelectasis throughout the right lung.  Apparent worsening density in the retrocardiac left lung base may represent atelectasis versus pneumonia, correlate clinically.  Small left-sided effusion not excluded.  No pneumothorax visualized.    Right pleural effusion appears stable in overall volume.  There are findings suggestive of pulmonary edema/CHF.  Small left pleural effusion is suspected.  There is basilar atelectasis.  There is enlargement of the cardiomediastinal silhouette with extensive atherosclerosis.  5/6 Chest x ray                    5/14 Chest x ray    "Ascites" documented     X "SOB" or "LOAIZA" documented  Patient presents with  Shortness of Breath  To ER per [facility] with c/o SOB and chest, abd pain from [facility].    She is on home O2 at 2 Liters at the nursing home already.  She had a recent hospitalization (4/19/18- 4/26/18) at [facility] for pneumonia  H&P   X "Hypoxia" documented Requiring supplemental oxygen  Hypoxic despite oxygen Cardiology consult 5/4 906 am    "Edema" documented     X "Right Heart Failure" documented RV enlargement RV/LV >0.9   Elevated BNP   CTA revealed " "extensive bilateral pulmonary embolisms with evidence of right heart strain and pulmonary infarcts.     ECHO 4/2018 w/ EF 45%, grade 3 diastolic dysfunction  · Right heart strain evident on CTA  · Resuscitated with fluids, monitor BP  · Pt with pacemaker Cardiology consult 5/4 906 am          Progress note 5/4 1005    Chronic Lung Disease       "ARDS" documented     X Pulmonary Embolism Bilateral pulmonary embolism    CTA chest with extensive filling defects throughout the pulmonary tree involving the segmental vessels to the right upper, right lower, left upper and left lower lobes.  Bowing of the interventricular septum to the left with dilatation of the right-sided chambers noted. Evidence of LLL infarction noted.     Cardiology consult 5/4 906 am   X Diuretics/Meds Furosemide 40 mg IV 5/6 1018  Furosemide 20 mg IV 5/13 1055  Furosemide 40 mg IV 5/13 2151  Furosemide 80 mg IV 2 times daily 5/14 1130 - 1338  Furosemide 40 mg IV 2 times daily 5/14 1130    Medication sheets    Treatment:      Other:        Provider, please specify the diagnosis or diagnoses associated with above clinical findings.    [ ] Acute Cor Pulmonale  [x] Chronic Cor Pulmonale  [  ] Acute on Chronic Cor Pulmonale  [  ] Other Cardiac Condition (Specify) ___________________________________  [  ] Clinically undetermined      Please document in your progress notes daily for the duration of treatment, until resolved, and include in your discharge summary.                                                                                                                                                                                                  "

## 2018-06-01 NOTE — DISCHARGE SUMMARY
Discharge Summary      Admit Date: 5/3/2018    Discharge Date and Time: 05/15/2018    Attending Physician:  Riky Santos MD    Discharge Physician: Tushar Cho MD    Principal Diagnoses: Bilateral pulmonary embolism  The primary encounter diagnosis was Bilateral pulmonary embolism. Diagnoses of Atrial fibrillation, Chest pain, Sepsis, due to unspecified organism, HCAP (healthcare-associated pneumonia), Leukocytosis, unspecified type, Hyponatremia, Pulmonary embolism with infarction, Pleural effusion, right, UTI due to extended-spectrum beta lactamase (ESBL) producing Escherichia coli, CHF (congestive heart failure), Combined systolic and diastolic congestive heart failure, unspecified HF chronicity, Atrial fibrillation with RVR, Chronic combined systolic and diastolic heart failure, Essential hypertension, Chest pain on breathing, Acute on chronic combined systolic and diastolic congestive heart failure, Orthostatic hypotension, Hypoxia, Hospice care, and Persistent atrial fibrillation were also pertinent to this visit.    Discharged Condition:     Hospital Course: 76 yo female w/ pmhx of CAD w/ pacemaker , CHF, chronic Afib (hx of sometimes with RVR), hx of ESBL UTI's presented from [facility] with left sided pleuritic chest pain that was also reproducible with palpation.  She was on home O2 at 2 Liters at the nursing home already.  She had a recent hospitalization (18- 18) at [facility] for pneumonia & ESBL UTI.  She was discharged to the nursing home due to her weakness.      Pt had only recently moved to [place] from [place] where she was having recurrent falls.  Anticoagulation was discontinued by her PCP in [place].      In ED CTA revealed sifnificant bilat pulmonary embolism with evidence of right heart strain.  Cardiology was called and heparin drip was recommended as well as observation in the ICU.  Also Infectious Disease was consulted because of the hx of  ESBL urinary tract infections.  Pt continued to have pleuritic chest pain and aching hip.  Pt received a total of 2 L bolus of IVF in the ED, patient did meet Septic criteria w/ tachycardia, WBC and source.  She was afebrile, however.    She was admitted to the ICU for bilateral PE on 5/3/18, was put on therapeutic heparin drip, and Cardiology was consulted. She also met sepsis criteria with elevated WBC of 21, and CXR was concerning for RLL pneumonia therefore she was started on broad-spectrum antibiotics. At this time she was tachycardic, hypotensive, requiring supplemental O2, hypoxic despite oxygen, and elevated BNP. Previous echo 1 month prior showed EF 40%. Repeat echo showed greatly worsened HF with EF of 10%, severe AR, and moderate TR, and right heart strain.  Cardiology placed EKOS catheter in attempt to rescue her RV. Cardiology performed catheter-directed thrombolysis with improvement in PASP 50-30. She was returned to the ICU for further monitoring.  She was treated with full course of Abx for E. Coli UTI. She also underwent thoracentesis for persistent right pleural effusion and 1 L of fluid was removed that analysis showed to be transudate (low SAAG) consistent with her poor cardiac output. This fluid eventually recollected in her right lung several days later. She continued to have tenuous fluids balance alternating between diuresis with lasix and giving gentle fluids for persistent orthostatic hypotension. It was difficult for PT/OT to work with her due to her getting so dizzy upon standing. Her condition was serious enough that she was never able to be stepped down from the ICU, despite consulting 5 different specialist teams. Ultimately her very low cardiac output coupled with her baseline poor lung function 2/2 COPD led to continued respiratory decline. On day 12 of her stay she had to be put on Bipap for decreasing oxygen saturations and the patient and family (son) decided it was time to make  her DNR after meeting with Palliative Care team. Patient and family decided to transition to comfort care at that time however she was unstable for discharge to her home in [place], [place] therefore family was okay with transitioning to comfort care in the hospital. She  one day later on 5/15/18 of Acute on Chronic Respiratory Failure 2/2 Decompensated Systolic Heart Failure and advanced COPD.       Consults: IP CONSULT TO CARDIOLOGY  IP CONSULT TO PULMONOLOGY  IP CONSULT TO CARDIOLOGY-[facility]  IP CONSULT TO REGISTERED DIETITIAN/NUTRITIONIST  IP CONSULT TO PALLIATIVE CARE    Significant Diagnostic Studies:   Blood Culture   Lab Results   Component Value Date    LABBLOO No growth after 5 days. 2018   , Sputum Culture No results found for: GSRESP, RESPIRATORYC and Urine Culture    Lab Results   Component Value Date    LABURIN ESCHERICHIA COLI ESBL  >100,000 cfu/ml   2018    and radiology: X-Ray: CXR: X-Ray Chest 1 View (CXR):   Results for orders placed or performed during the hospital encounter of 18   X-Ray Chest 1 View    Narrative    EXAMINATION:  XR CHEST 1 VIEW    CLINICAL HISTORY:  pleural effusion;    TECHNIQUE:  Single frontal view of the chest was performed.    COMPARISON:  2018    FINDINGS:  Right pleural effusion appears stable in overall volume.  There are findings suggestive of pulmonary edema/CHF.  Small left pleural effusion is suspected.  There is basilar atelectasis.  There is enlargement of the cardiomediastinal silhouette with extensive atherosclerosis.  Pacemaker is in stable position.  Bones demonstrate no acute abnormality.  There is a age indeterminate left lower rib fracture.      Impression    No significant detrimental change from prior.  Bilateral pleural effusions right greater than left with findings suggestive pulmonary edema/CHF.      Electronically signed by: Martin Henao MD  Date:    2018  Time:    08:30      Imaging Results          CTA Chest  Non-Coronary (PE Study) (Final result)  Result time 05/04/18 00:07:19    Final result by Trevin Burleson MD (05/04/18 00:07:19)                 Impression:      Extensive bilateral pulmonary embolism with evidence of right heart strain.    Large right-sided pleural effusion with associated compressive atelectasis.    Air bronchograms within the right lower lobe may represent some component of infectious pneumonia.    Peripheral airspace opacity in the left lower lobe may represent areas of pulmonary infarction.    Given the degree of atelectasis suggest outpatient PET-CT scan after the patient's symptoms resolve to look for primary pulmonary tumor.    Case discussed with Dr. Moseley at 05/04/2018 on 12:05 a.m.      Electronically signed by: Trevin Burleson MD  Date:    05/04/2018  Time:    00:07             Narrative:    EXAMINATION:  CTA CHEST NON CORONARY    CLINICAL HISTORY:  Bacterial pneumonia, not elsewhere classified;    TECHNIQUE:  Low dose axial images, sagittal and coronal reformations were obtained from the thoracic inlet to the lung bases following the IV administration of 100 mL of Omnipaque 350.  Contrast timing was optimized to evaluate the pulmonary arteries.  MIP images were performed.    COMPARISON:  Chest x-ray dated 05/03/2018 factors    FINDINGS:  CT pulmonary embolism examination:    There are extensive filling defects throughout the pulmonary tree involving the segmental vessels to the right upper, right lower, left upper and left lower lobes.  No saddle embolism component is present.  There is bowing of the interventricular septum to the left with dilatation of the right-sided chambers.  There is also reflux of contrast into the IVC and the azygos system.    CT chest examination:    The thyroid gland is unremarkable.  The base of the neck is within normal limits.  The supraclavicular regions are unremarkable.    The trachea is unremarkable.  No definitive endobronchial lesion is identified on the  left.  Evaluation for endobronchial lesion involving the right middle and lower lobes is significantly difficult given the atelectatic changes present.    There is a lower lead pacemaker present with the tips terminating in the right atrium and right ventricle.  No pericardial effusions identified.  There is enlargement of the right-sided chambers as described above.    The thoracic aorta is normal in caliber.  There is an aberrant right subclavian artery.  The remainder of the great vessels appear unremarkable.    There is no evidence of lymphadenopathy in the chest.  The axillary regions are within normal limits.    There is a large right-sided pleural effusion with associated compressive atelectasis.  There is trace left-sided pleural effusion.  There is no evidence of a pneumothorax.  There is no evidence of pneumomediastinum.    There is diffuse emphysema.  There also air bronchograms within the right lower lobe.  There are peripheral airspace opacities in the left lower lobe.  There are scattered ground-glass airspace opacities in the remainder of the lung fields.    The esophagus is within normal limits.  There is nodular appearance of the liver.  The remainder of the visualized upper abdominal structures are unremarkable.    The chest wall is within normal limits.  The osseous structures are within normal limits.                               X-Ray Chest AP Portable (Final result)  Result time 05/03/18 21:27:25    Final result by Tino Moody MD (05/03/18 21:27:25)                 Impression:      1. Moderate right pleural effusion with associated compressive atelectasis and/or lower lobe consolidation.  Finding is superimposed upon edema bilaterally.  Correlation advised.      Electronically signed by: Tino Moody MD  Date:    05/03/2018  Time:    21:27             Narrative:    EXAMINATION:  XR CHEST AP PORTABLE    CLINICAL HISTORY:  Chest pain, unspecified    TECHNIQUE:  Single frontal view of  the chest was performed.    COMPARISON:  2018    FINDINGS:  The cardiomediastinal silhouette is not enlarged noting calcification of the aorta.  Left chest wall pacer noted..  There is a right pleural effusion, noting possible trace left effusion..  The trachea is midline.  The lungs are symmetrically expanded bilaterally with patchy increased interstitial and parenchymal attenuation projected over the right hemothorax.  There is left basilar subsegmental atelectasis or scarring.  Superimposed right lower lung zone consolidation not excluded..  There is no pneumothorax.  The osseous structures are remarkable for degenerative change..                                Treatments: Antibiotics, catheter-directed thrombolysis, anticoagulation, breathing treatments, IVF's, lasix, PT/OT, steroids    Disposition:      > 30 mins spent on discharge summary      Tushar Cho  2018  9:00 AM

## 2022-09-27 NOTE — PROGRESS NOTES
Progress Note  [facility]    Admit Date: 5/3/2018   LOS: 4 days     SUBJECTIVE:     Follow-up For:  Extensive bilateral PE and R-sided pleural effusion     Patient interviewed and examined this morning. Very sleepy during interview, despite stating that she slept last night. UOP 1010 cc in last 24 hrs, net + 843 since admit. Patient has no complaints this morning.      Scheduled Meds:   albuterol-ipratropium 2.5mg-0.5mg/3mL  3 mL Nebulization Q6H    amiodarone  200 mg Oral BID    amitriptyline  10 mg Oral BID    aspirin  325 mg Oral Daily    azithromycin  500 mg Intravenous Q24H    cetirizine  10 mg Oral Daily    DULoxetine  30 mg Oral Daily    gabapentin  600 mg Oral TID    levothyroxine  25 mcg Oral Before breakfast    lidocaine  1 patch Transdermal Q24H    lubiprostone  24 mcg Oral BID WM    pantoprazole 40 mg in dextrose 5 % 100 mL infusion (ready to mix system)  40 mg Intravenous Daily     Continuous Infusions:   heparin (porcine) in D5W 22 Units/kg/hr (05/08/18 0551)     PRN Meds:acetaminophen, benzonatate, bisacodyl, heparin (PORCINE), heparin (PORCINE), sodium chloride 0.9%    Review of patient's allergies indicates:  No Known Allergies    Review of Systems  Pos: chronic back pain  Neg: CP, SOB    OBJECTIVE:     Vital Signs (Most Recent)  Temp: 97.3 °F (36.3 °C) (05/08/18 0715)  Pulse: 109 (05/08/18 0945)  Resp: 19 (05/08/18 0945)  BP: 98/72 (05/08/18 0945)  SpO2: 96 % (05/08/18 0945)    Vital Signs Range (Last 24H):  Temp:  [96.4 °F (35.8 °C)-97.3 °F (36.3 °C)]   Pulse:  []   Resp:  [14-31]   BP: ()/(48-84)   SpO2:  [82 %-100 %]     I & O (Last 24H):    Intake/Output Summary (Last 24 hours) at 05/08/18 1023  Last data filed at 05/08/18 0932   Gross per 24 hour   Intake           1628.4 ml   Output              950 ml   Net            678.4 ml     Wt Readings from Last 3 Encounters:   05/08/18 52.1 kg (114 lb 13.8 oz)   04/26/18 48.8 kg (107 lb 9.4 oz)   12/11/17 49 kg (108  [___] : [unfilled] lb)     Physical Exam:  Gen: NAD  HEENT: NC, AT  Chest: decreased breath sounds b/l, worse on R lower lung field, and mild bibasilar rales b/l   CVS: irregular irregular rhythm, no m/r/g  Abdomen: soft, NT, ND, no masses, +BS  Ext: no edema, pulses 2+   Skin: ro rashes  Neuro: A&O x 3, CN's grossly intact, sensation intact to light touch  Psych: Normal mood, affect, thought content    Laboratory:  LABS  CBC    Recent Labs  Lab 05/06/18  0542 05/07/18  1740 05/08/18  0405   WBC 12.14 10.77 6.38   RBC 4.04 3.92* 3.64*   HGB 12.1 11.6* 10.7*   HCT 38.1 37.0 33.6*   * 322 300   MCV 94 94 92   MCH 30.0 29.6 29.4   MCHC 31.8* 31.4* 31.8*     BMP    Recent Labs  Lab 05/06/18  0542 05/07/18  0349 05/08/18  0405   * 135* 136   K 4.1 3.2* 3.1*   CO2 23 25 26    99 103   BUN 13 12 10   CREATININE 0.7 0.7 0.7   GLU 93 94 98         Recent Labs  Lab 05/04/18  0401 05/06/18  0542 05/07/18  0349 05/08/18  0405   CALCIUM 8.0* 8.6* 8.3* 8.1*   MG 1.8  --   --  1.6   PHOS 3.4  --   --  3.1     LFT    Recent Labs  Lab 05/06/18  0542 05/07/18  0349 05/08/18  0405   PROT 5.6* 5.1* 4.7*   ALBUMIN 1.8* 1.6* 1.5*   BILITOT 0.5 0.5 0.4   AST 16 14 13   ALKPHOS 106 90 81   ALT 14 9* 9*       COAGS    Recent Labs  Lab 05/04/18  0058  05/06/18  1829 05/07/18  0349 05/08/18  0405   INR 1.1  --   --   --   --    APTT 28.1  < > 22.5 68.3* 49.7*   < > = values in this interval not displayed.  CE    Recent Labs  Lab 05/03/18 2114   TROPONINI 0.010     BNP    Recent Labs  Lab 05/03/18 2114   *       LAST HbA1c  Lab Results   Component Value Date    HGBA1C 4.8 04/19/2018         Diagnostic Results:    IP CONSULT TO CARDIOLOGY  IP CONSULT TO PULMONOLOGY    ASSESSMENT/PLAN:     Neuro/Psych:  · A&O x 3  · Avoid sedating pain meds if possible- percocet dose yesterday appeared to cause worsening of condition  · Pt is on cymbalta and amytriptiline. Amytriptiline is less favorable in geriatric population. Will wean  [de-identified] : 9/26/22 SR @ 73 bpm \par 9/27/21 SR @ 76 bpm  off.     Cardiovascular: (Hypertension, Chronic atrial fibrillation w/ hx of RVR)   · Home meds include amiodarone and diltiazem.  Will continue them to help ensure rate control &, thus, rythym control  · ECHO 4/2018 w/ EF 45%, grade 3 diastolic dysfunction  ECHO 4 days ago: EF 10%, severe MR, mod TR  · Right heart strain evident on CTA  · Pt with pacemaker  Gave fluids this AM for orthostatic hypotension which did improve s/p IVF boluses, however it persists  Re-consult Cards for further recs regarding persistent orthostatic hypotension     Pulmonary: (Bilat Pulm Embolisms, ?Pneumonia)   · DuoNeb q 6 hours scheduled  · ABx: deescalated to Azithromycin IV   Moderate right-sided pleural effusion drained by Pulm, removed 2L   Maintaining O2 saturations on 2L NC    GI/NUT:   · NPO   · GI Proph -- Protonix 40 mg IV daily while NPO     Renal/Electrolytes:   · UOP 1010 cc in last 24 hrs  · Rodriguez resumed due to hx of urinary retention       Infectious Disease: (HAP)   ·  Pt met sepsis criteria with HR, WBC and source  ·  Concern for recurrent RLL PNA  · S/p 30 cc/kg of fluid  · Leukocytosis improving: WBC 16.3-> 12.1  · Sputum culture pending   · Blood cultures NGTD, urine cultures growing candida, asymptomatic  · Pt with hx of ESBL UTI  - will hold of on diflucan for now as can cause QT prolongation and patient is already on amiodarone     Hem/Coag: (Anemia, DVT Proph)   · Current Hgb =       · 10.7  ·  H&H has been stable  ·  On IV Heparin GGT for bilat pulm embolism with protocol for checking  PTT  ·  Cards on board; s/p cath lab thrombolysis     Cards recommending long-term AC with eliquis at discharge  ·  DVT Proph - Heparin for now     Endocrine:   · Conservative glycemic control, goal < 180 mg/dl     Musculoskeletal/Skin:   · Sacral erythema  · Repositioning per ICU protocol    Chronic back and b/l foot pain with Hx of neuropathy on percocet 5 mg and gabapentin at home   Restarted gabapentin but holding  percocet    Dispo: Patient needs to work with PT/OT today; f/u Cards recs for persistent hypotension    5/8/2018 Tushar Cho MD  7:43 AM

## 2023-03-16 NOTE — PLAN OF CARE
Problem: Physical Therapy Goal  Goal: Physical Therapy Goal  Goals to be met by: 2018     Patient will increase functional independence with mobility by performin. Supine to sit with Stand-by Assistance  2. Sit to supine with Stand-by Assistance  3. Sit to stand transfer with Stand-by Assistance  4. Gait  x 300 feet with Stand-by Assistance using Rolling Walker.         Outcome: Ongoing (interventions implemented as appropriate)  Patient tolerated sitting in bedside chair most of the day; pt participated with BLE ex and using RW to take steps toward bed; fatigued following session.       157.48